# Patient Record
Sex: MALE | Race: WHITE | NOT HISPANIC OR LATINO | Employment: FULL TIME
[De-identification: names, ages, dates, MRNs, and addresses within clinical notes are randomized per-mention and may not be internally consistent; named-entity substitution may affect disease eponyms.]

---

## 2017-05-10 ENCOUNTER — RECORDS - HEALTHEAST (OUTPATIENT)
Dept: ADMINISTRATIVE | Facility: OTHER | Age: 32
End: 2017-05-10

## 2020-09-22 ENCOUNTER — OFFICE VISIT - RIVER FALLS (OUTPATIENT)
Dept: FAMILY MEDICINE | Facility: CLINIC | Age: 35
End: 2020-09-22

## 2020-09-22 ENCOUNTER — COMMUNICATION - RIVER FALLS (OUTPATIENT)
Dept: FAMILY MEDICINE | Facility: CLINIC | Age: 35
End: 2020-09-22

## 2020-09-22 LAB
A/G RATIO - HISTORICAL: 1.5 (ref 1–2)
ALBUMIN SERPL-MCNC: 4.4 G/DL (ref 3.5–5.2)
ALP SERPL-CCNC: 103 IU/L (ref 50–136)
ALT SERPL W P-5'-P-CCNC: 54 IU/L (ref 8–45)
ANION GAP SERPL CALCULATED.3IONS-SCNC: 12 MMOL/L (ref 5–18)
AST SERPL W P-5'-P-CCNC: 29 IU/L (ref 2–40)
BILIRUB DIRECT SERPL-MCNC: 0.4 MG/DL (ref 0.1–0.5)
BILIRUB INDIRECT SERPL-MCNC: 1.2 MG/DL (ref 0.2–0.8)
BILIRUB SERPL-MCNC: 1.6 MG/DL (ref 0.2–1.2)
BUN SERPL-MCNC: 12 MG/DL (ref 8–25)
BUN/CREAT RATIO - HISTORICAL: 12 (ref 10–20)
CALCIUM SERPL-MCNC: 8.9 MG/DL (ref 8.5–10.5)
CHLORIDE BLD-SCNC: 106 MMOL/L (ref 98–110)
CO2 SERPL-SCNC: 23 MMOL/L (ref 21–31)
CREAT SERPL-MCNC: 0.99 MG/DL (ref 0.72–1.25)
ERYTHROCYTE [DISTWIDTH] IN BLOOD BY AUTOMATED COUNT: 13.3 % (ref 11.5–15.5)
GFR ESTIMATE EXT - HISTORICAL: >60
GLOBULIN: 3 G/DL (ref 2–3.7)
GLUCOSE BLD-MCNC: 148 MG/DL (ref 65–100)
HCT VFR BLD AUTO: 48.2 % (ref 37–53)
HGB BLD-MCNC: 17.4 G/DL (ref 13.5–17.5)
MCH RBC QN AUTO: 31 PG (ref 26–34)
MCHC RBC AUTO-ENTMCNC: 36.1 G/DL (ref 32–36)
MCV RBC AUTO: 86 FL (ref 80–100)
PLATELET # BLD AUTO: 174 10*3/UL (ref 140–440)
PMV BLD: 11.8 FL (ref 6.5–11)
POTASSIUM BLD-SCNC: 3.6 MMOL/L (ref 3.5–5)
PROT SERPL-MCNC: 7.4 G/DL (ref 6–8)
RBC # BLD AUTO: 5.62 10*6/UL (ref 4.3–5.9)
SODIUM SERPL-SCNC: 141 MMOL/L (ref 135–145)
TROPONIN I - HISTORICAL: <0.01 NG/ML
WBC # BLD AUTO: 9.2 10*3/UL (ref 4.5–11)

## 2020-09-22 ASSESSMENT — MIFFLIN-ST. JEOR: SCORE: 2080.24

## 2020-09-23 ENCOUNTER — COMMUNICATION - RIVER FALLS (OUTPATIENT)
Dept: FAMILY MEDICINE | Facility: CLINIC | Age: 35
End: 2020-09-23

## 2020-09-23 LAB
MAGNESIUM SERPL-MCNC: 2 MG/DL (ref 1.5–2.5)
TSH SERPL DL<=0.005 MIU/L-ACNC: 1.14 MIU/L (ref 0.4–4.5)

## 2020-09-25 ENCOUNTER — OFFICE VISIT - RIVER FALLS (OUTPATIENT)
Dept: FAMILY MEDICINE | Facility: CLINIC | Age: 35
End: 2020-09-25

## 2020-09-25 ASSESSMENT — MIFFLIN-ST. JEOR: SCORE: 2094.75

## 2020-09-26 LAB
CHOLEST SERPL-MCNC: 196 MG/DL
CHOLEST/HDLC SERPL: 7.8 {RATIO}
HDLC SERPL-MCNC: 25 MG/DL
LDLC SERPL CALC-MCNC: ABNORMAL MG/DL
NONHDLC SERPL-MCNC: 171 MG/DL
TRIGL SERPL-MCNC: 985 MG/DL

## 2020-09-27 ENCOUNTER — COMMUNICATION - RIVER FALLS (OUTPATIENT)
Dept: FAMILY MEDICINE | Facility: CLINIC | Age: 35
End: 2020-09-27

## 2020-09-30 ENCOUNTER — OFFICE VISIT - HEALTHEAST (OUTPATIENT)
Dept: CARDIOLOGY | Facility: TELEHEALTH | Age: 35
End: 2020-09-30

## 2020-09-30 ENCOUNTER — COMMUNICATION - RIVER FALLS (OUTPATIENT)
Dept: FAMILY MEDICINE | Facility: CLINIC | Age: 35
End: 2020-09-30

## 2020-09-30 DIAGNOSIS — R06.81 APNEA: ICD-10-CM

## 2020-09-30 DIAGNOSIS — E66.01 CLASS 2 SEVERE OBESITY DUE TO EXCESS CALORIES WITH SERIOUS COMORBIDITY AND BODY MASS INDEX (BMI) OF 38.0 TO 38.9 IN ADULT (H): ICD-10-CM

## 2020-09-30 DIAGNOSIS — R00.2 PALPITATIONS: ICD-10-CM

## 2020-09-30 DIAGNOSIS — I11.9 HYPERTENSIVE LEFT VENTRICULAR HYPERTROPHY, WITHOUT HEART FAILURE: ICD-10-CM

## 2020-09-30 DIAGNOSIS — E66.812 CLASS 2 SEVERE OBESITY DUE TO EXCESS CALORIES WITH SERIOUS COMORBIDITY AND BODY MASS INDEX (BMI) OF 38.0 TO 38.9 IN ADULT (H): ICD-10-CM

## 2020-09-30 DIAGNOSIS — I10 BENIGN ESSENTIAL HYPERTENSION: ICD-10-CM

## 2020-09-30 RX ORDER — LOSARTAN POTASSIUM AND HYDROCHLOROTHIAZIDE 12.5; 5 MG/1; MG/1
1 TABLET ORAL DAILY
Qty: 90 TABLET | Refills: 3 | Status: SHIPPED | OUTPATIENT
Start: 2020-09-30 | End: 2022-11-08

## 2020-10-06 ENCOUNTER — AMBULATORY - RIVER FALLS (OUTPATIENT)
Dept: FAMILY MEDICINE | Facility: CLINIC | Age: 35
End: 2020-10-06

## 2020-10-12 ENCOUNTER — AMBULATORY - HEALTHEAST (OUTPATIENT)
Dept: CARDIOLOGY | Facility: CLINIC | Age: 35
End: 2020-10-12

## 2020-10-14 ENCOUNTER — COMMUNICATION - HEALTHEAST (OUTPATIENT)
Dept: CARDIOLOGY | Facility: CLINIC | Age: 35
End: 2020-10-14

## 2021-02-09 ENCOUNTER — OFFICE VISIT - RIVER FALLS (OUTPATIENT)
Dept: FAMILY MEDICINE | Facility: CLINIC | Age: 36
End: 2021-02-09

## 2021-02-09 ASSESSMENT — MIFFLIN-ST. JEOR: SCORE: 1948.24

## 2021-02-13 LAB
A/G RATIO - HISTORICAL: 1.9 (ref 1–2.5)
ALBUMIN SERPL-MCNC: 4.1 GM/DL (ref 3.6–5.1)
ALBUMIN UR-MCNC: NEGATIVE G/DL
ALP SERPL-CCNC: 97 UNIT/L (ref 36–130)
ALT SERPL W P-5'-P-CCNC: 15 UNIT/L (ref 9–46)
APPEARANCE UR: CLEAR
AST SERPL W P-5'-P-CCNC: 12 UNIT/L (ref 10–40)
BASOPHILS # BLD MANUAL: 49 10*3/UL (ref 0–200)
BASOPHILS NFR BLD MANUAL: 0.6 %
BILIRUB SERPL-MCNC: 1.2 MG/DL (ref 0.2–1.2)
BILIRUB UR QL STRIP: NEGATIVE
BUN SERPL-MCNC: 7 MG/DL (ref 7–25)
BUN/CREAT RATIO - HISTORICAL: ABNORMAL (ref 6–22)
C PEPTIDE: 1.28 NG/ML (ref 0.8–3.85)
CALCIUM SERPL-MCNC: 9.4 MG/DL (ref 8.6–10.3)
CHLORIDE BLD-SCNC: 101 MMOL/L (ref 98–110)
CO2 SERPL-SCNC: 23 MMOL/L (ref 20–32)
COLOR UR AUTO: YELLOW
CREAT SERPL-MCNC: 0.76 MG/DL (ref 0.6–1.35)
CREAT UR-MCNC: 113 MG/DL (ref 20–320)
EGFRCR SERPLBLD CKD-EPI 2021: 119 ML/MIN/1.73M2
EOSINOPHIL # BLD MANUAL: 303 10*3/UL (ref 15–500)
EOSINOPHIL NFR BLD MANUAL: 3.7 %
ERYTHROCYTE [DISTWIDTH] IN BLOOD BY AUTOMATED COUNT: 12.9 % (ref 11–15)
GLOBULIN: 2.2 (ref 1.9–3.7)
GLUCOSE BLD-MCNC: 378 MG/DL (ref 65–99)
GLUCOSE UR STRIP-MCNC: ABNORMAL MG/DL
HCT VFR BLD AUTO: 44.5 % (ref 38.5–50)
HGB BLD-MCNC: 15.7 GM/DL (ref 13.2–17.1)
HGB UR QL STRIP: NEGATIVE
KETONES UR STRIP-MCNC: ABNORMAL MG/DL
LEUKOCYTE ESTERASE UR QL STRIP: NEGATIVE
LYMPHOCYTES # BLD MANUAL: 2296 10*3/UL (ref 850–3900)
LYMPHOCYTES NFR BLD MANUAL: 28 %
MCH RBC QN AUTO: 31.2 PG (ref 27–33)
MCHC RBC AUTO-ENTMCNC: 35.3 GM/DL (ref 32–36)
MCV RBC AUTO: 88.3 FL (ref 80–100)
MICROALBUMIN UR-MCNC: 2.2 MG/DL
MICROALBUMIN/CREAT UR: 19 MG/G{CREAT}
MONOCYTES # BLD MANUAL: 328 10*3/UL (ref 200–950)
MONOCYTES NFR BLD MANUAL: 4 %
NEUTROPHILS # BLD MANUAL: 5223 10*3/UL (ref 1500–7800)
NEUTROPHILS NFR BLD MANUAL: 63.7 %
NITRATE UR QL: NEGATIVE
PH UR STRIP: 5.5 [PH] (ref 5–8)
PLATELET # BLD AUTO: 137 10*3/UL (ref 140–400)
PMV BLD: 12.4 FL (ref 7.5–12.5)
POTASSIUM BLD-SCNC: 4.4 MMOL/L (ref 3.5–5.3)
PROT SERPL-MCNC: 6.3 GM/DL (ref 6.1–8.1)
RBC # BLD AUTO: 5.04 10*6/UL (ref 4.2–5.8)
SODIUM SERPL-SCNC: 135 MMOL/L (ref 135–146)
SP GR UR STRIP: 1.04 (ref 1–1.03)
TSH SERPL DL<=0.005 MIU/L-ACNC: 0.82 MIU/L (ref 0.4–4.5)
WBC # BLD AUTO: 8.2 10*3/UL (ref 3.8–10.8)

## 2021-02-14 ENCOUNTER — COMMUNICATION - RIVER FALLS (OUTPATIENT)
Dept: FAMILY MEDICINE | Facility: CLINIC | Age: 36
End: 2021-02-14

## 2021-02-19 ENCOUNTER — COMMUNICATION - RIVER FALLS (OUTPATIENT)
Dept: FAMILY MEDICINE | Facility: CLINIC | Age: 36
End: 2021-02-19

## 2021-03-04 ENCOUNTER — OFFICE VISIT - RIVER FALLS (OUTPATIENT)
Dept: FAMILY MEDICINE | Facility: CLINIC | Age: 36
End: 2021-03-04

## 2021-03-04 ASSESSMENT — MIFFLIN-ST. JEOR: SCORE: 1973.19

## 2021-04-06 ENCOUNTER — OFFICE VISIT - RIVER FALLS (OUTPATIENT)
Dept: FAMILY MEDICINE | Facility: CLINIC | Age: 36
End: 2021-04-06

## 2021-04-09 ENCOUNTER — COMMUNICATION - HEALTHEAST (OUTPATIENT)
Dept: CARDIOLOGY | Facility: CLINIC | Age: 36
End: 2021-04-09

## 2021-04-23 ENCOUNTER — COMMUNICATION - HEALTHEAST (OUTPATIENT)
Dept: ADMINISTRATIVE | Facility: CLINIC | Age: 36
End: 2021-04-23

## 2021-05-30 VITALS — BODY MASS INDEX: 36.92 KG/M2 | HEIGHT: 69 IN

## 2021-05-30 VITALS — WEIGHT: 250 LBS

## 2021-06-02 ENCOUNTER — OFFICE VISIT - RIVER FALLS (OUTPATIENT)
Dept: FAMILY MEDICINE | Facility: CLINIC | Age: 36
End: 2021-06-02

## 2021-06-02 ASSESSMENT — MIFFLIN-ST. JEOR: SCORE: 2003.13

## 2021-06-04 VITALS
DIASTOLIC BLOOD PRESSURE: 94 MMHG | SYSTOLIC BLOOD PRESSURE: 142 MMHG | BODY MASS INDEX: 38.54 KG/M2 | TEMPERATURE: 98 F | WEIGHT: 261 LBS

## 2021-06-12 NOTE — TELEPHONE ENCOUNTER
----- Message from Haris Post DO sent at 10/14/2020 10:21 AM CDT -----  Inform the patient that I reviewed his Holter monitor.  There is no significant arrhythmias during his symptomatic episodes.  There is no arrhythmias during the entire cardiac monitoring period.  Please inquire if patient's blood pressure is improved.  He had significant hypertension which needs to be better control which is likely the cause of some of his symptoms.  ----- Message -----  From: Karlie Shea  Sent: 10/12/2020   1:38 PM CDT  To: Haris Post DO

## 2021-06-12 NOTE — TELEPHONE ENCOUNTER
PC to patient and discussion of results and recommendations from provider. Pt doesn't have a blood pressure cuff at home at this time. Discussion of obtaining a digital at home blood pressure monitor with arm cuff. Pt agrees to go purchase one and states that he has a HSA to utilize for purchase. Instructed patient to purchase and take blood pressure reading and HR every day or every other day, and writer down on a log and keep track. Explained rationale, and requested patient call writer after a couple of weeks of monitoring to discuss and give update to MAT. Pt verbalized understanding. No further questions. Has writers' direct phone number to contact once ready to do so. Munir,Rn

## 2021-06-21 NOTE — LETTER
Letter by Haris Post DO at      Author: Haris Post DO Service: -- Author Type: --    Filed:  Encounter Date: 4/23/2021 Status: (Other)         Wolf Gibbs   [address] [facility] [address]      April 23, 2021      Dear Wolf,    This letter is to remind you that you will be due for your follow up appointment with Dr. Haris Post in May, 2021 . To help ensure you are in the best health possible, a regular follow-up with your cardiologist is essential.     Please call our Patient Scheduling Line at [phone] to schedule your appointment at your earliest convenience.  If you have recently scheduled an appointment, please disregard this letter.    We look forward to seeing you again. As always, we are available at the number  above for any questions or concerns you may have.      Sincerely,     The Physicians and Staff of [facility]

## 2021-06-21 NOTE — LETTER
Letter by Haris Post DO at      Author: Haris Post DO Service: -- Author Type: --    Filed:  Encounter Date: 4/9/2021 Status: (Other)         Wolf Gibbs   [address] [facility] [address]      April 9, 2021      Dear Wolf,    This letter is to remind you that you are due for your follow up appointment with Dr. Haris Post. To help ensure you are in the best health possible, a regular follow-up with your cardiologist is essential.     Please call our Patient Scheduling Line at [phone] to schedule your appointment at your earliest convenience.  If you have recently scheduled an appointment, please disregard this letter.    We look forward to seeing you again. As always, we are available at the number  above for any questions or concerns you may have.      Sincerely,     The Physicians and Staff of [facility]

## 2021-06-29 NOTE — PROGRESS NOTES
Progress Notes by Haris Post DO at 9/30/2020  3:30 PM     Author: Haris Post DO Service: -- Author Type: Physician    Filed: 9/30/2020 10:17 PM Encounter Date: 9/30/2020 Status: Signed    : Haris Post DO (Physician)         Thank you, Dr. Duff, for asking the [facility] team to see Mr. Wolf Gibbs to evaluate palpitations, hypertension.      Assessment/Recommendations   Assessment:    1.  Palpitations, frequent.  Patient has frequent palpitations lasting anywhere from a few minutes up to 30 minutes.  Associated lightheadedness and sometimes shortness of breath.  2.  Uncontrolled hypertension  3.  Left ventricular hypertrophy  4.  Needs screening for sleep apnea.  Severe snoring and withness sleep apnea.   5. Obesity (BMI:38) with serious comorbidities    Plan:  1.  Change losartan to losartan 50 mg/hydrochlorothiazide 12.5 mg.  Patient has stage II hypertension likely clinic taking 2-3 medications to control.  He has left ventricular hypertrophy is recommend ARB, followed by a diuretic followed by beta-blocker if needed.  2.  48-hour Holter monitor to determine the cause of his frequent palpitations.  3. Complete echocardiogram to assess his left ventricular hypertrophy.  4. Screening and referral for sleep apnea needed.         History of Present Illness/Subjective    Mr. Wolf Gibbs is a 35 y.o. male with history of left ventricular hypertrophy by prior echo, hypertension has been uncontrolled and possible sleep apnea presents in consultation for palpitations and uncontrolled hypertension.    Patient was recently evaluated by his primary care provider with elevated blood pressure readings.  He was initially started on losartan and increased to 50 mg daily.  Currently he is uncontrolled blood pressure standpoint.  He has a high salt diet.  Does not exercise and remains overweight.  He has significant symptoms concerning for sleep  apnea.    Long discussion with the patient that he needs to reduce his salt intake.  He needs to get better control of his blood pressure and will start hydrochlorothiazide in combination with losartan.  He has stage II hypertension will likely require 2-3 medications to control his blood pressure.  Given he is left ventricular hypertrophy on his echocardiogram in 2017 he likely has progression of his left ventricular hypertrophy and will result in hypertensive heart disease and possible heart failure if this not further controlled.  Palpitations could be resulting from an atrial arrhythmia.  We will obtain a cardiac monitor to for further evaluate.  He states his palpitations last anywhere from a few minutes to up to 30 minutes.  He has forceful beats but also fluttering in his chest.  Symptoms appear to be worse with sleep deprivation.  Worse with increased alcohol use and also increased caffeine use.         Physical Examination Review of Systems   Vitals:    09/30/20 1535   BP: (!) 142/94   Temp: 98  F (36.7  C)     Body mass index is 38.54 kg/m .  Wt Readings from Last 3 Encounters:   09/30/20 (!) 261 lb (118.4 kg)     [unfilled]  General Appearance:   no distress, obese body habitus   ENT/Mouth: membranes moist, no oral lesions or bleeding gums.      EYES:  no scleral icterus, normal conjunctivae   Neck: no carotid bruits or thyromegaly   Chest/Lungs:   lungs are clear to auscultation, no rales or wheezing, no sternal scar, equal chest wall expansion    Cardiovascular:   Regular. Normal first and second heart sounds withno murmurs, rubs, or gallops; the carotid, radial and posterior tibial pulses are intact, Jugular venous pressure normal, trace pitting edema bilaterally    Abdomen:  no organomegaly, masses, bruits, or tenderness; bowel sounds are present   Extremities: no cyanosis or clubbing   Skin: no xanthelasma, warm.    Neurologic: normal  bilateral, no tremors     Psychiatric: alert and oriented x3,  calm     General: Weight Gain, Weight Loss  Eyes: WNL  Ears/Nose/Throat: WNL  Lungs: WNL  Heart: Irregular Heartbeat  Stomach: WNL  Bladder: WNL  Muscle/Joints: WNL  Skin: WNL  Nervous System: WNL        Blood: WNL     Medical History  Surgical History Family History Social History   Hypertension  no prior cardiac surgeries  family history of coronary disease and hypertension in his parents. Social History     Socioeconomic History   ? Marital status: Single     Spouse name: Not on file   ? Number of children: Not on file   ? Years of education: Not on file   ? Highest education level: Not on file   Occupational History   ? Not on file   Social Needs   ? Financial resource strain: Not on file   ? Food insecurity     Worry: Not on file     Inability: Not on file   ? Transportation needs     Medical: Not on file     Non-medical: Not on file   Tobacco Use   ? Smoking status: Current Every Day Smoker     Packs/day: 1.00   ? Smokeless tobacco: Never Used   Substance and Sexual Activity   ? Alcohol use: Not on file   ? Drug use: Not on file   ? Sexual activity: Not on file   Lifestyle   ? Physical activity     Days per week: Not on file     Minutes per session: Not on file   ? Stress: Not on file   Relationships   ? Social connections     Talks on phone: Not on file     Gets together: Not on file     Attends [employer] service: Not on file     Active member of club or organization: Not on file     Attends meetings of clubs or organizations: Not on file     Relationship status: Not on file   ? Intimate partner violence     Fear of current or ex partner: Not on file     Emotionally abused: Not on file     Physically abused: Not on file     Forced sexual activity: Not on file   Other Topics Concern   ? Not on file   Social History Narrative   ? Not on file          Medications  Allergies   Current Outpatient Medications   Medication Sig Dispense Refill   ? losartan (COZAAR) 50 MG tablet Take 50 mg by mouth daily.     ?  losartan-hydrochlorothiazide (HYZAAR) 50-12.5 mg per tablet Take 1 tablet by mouth daily. 90 tablet 3     No current facility-administered medications for this visit.     No Known Allergies      Lab Results    Chemistry/lipid CBC Cardiac Enzymes/BNP/TSH/INR   No results found for: CHOL, HDL, LDLCALC, TRIG, CREATININE, BUN, K, NA, CL, CO2 No results found for: WBC, HGB, HCT, MCV, PLT No results found for: CKTOTAL, CKMB, CKMBINDEX, TROPONINI, BNP, TSH, INR

## 2021-07-07 ENCOUNTER — OFFICE VISIT - RIVER FALLS (OUTPATIENT)
Dept: FAMILY MEDICINE | Facility: CLINIC | Age: 36
End: 2021-07-07

## 2022-02-11 VITALS
HEART RATE: 80 BPM | WEIGHT: 257.8 LBS | WEIGHT: 254.6 LBS | HEIGHT: 69 IN | HEART RATE: 92 BPM | HEIGHT: 69 IN | BODY MASS INDEX: 38.18 KG/M2 | SYSTOLIC BLOOD PRESSURE: 146 MMHG | BODY MASS INDEX: 37.71 KG/M2 | DIASTOLIC BLOOD PRESSURE: 94 MMHG | DIASTOLIC BLOOD PRESSURE: 100 MMHG | TEMPERATURE: 97.4 F | SYSTOLIC BLOOD PRESSURE: 160 MMHG | TEMPERATURE: 98.3 F

## 2022-02-11 VITALS — HEIGHT: 69 IN | WEIGHT: 231 LBS | BODY MASS INDEX: 34.21 KG/M2

## 2022-02-11 VITALS
BODY MASS INDEX: 33.4 KG/M2 | HEART RATE: 89 BPM | SYSTOLIC BLOOD PRESSURE: 136 MMHG | DIASTOLIC BLOOD PRESSURE: 77 MMHG | TEMPERATURE: 97.8 F | HEIGHT: 69 IN | WEIGHT: 225.5 LBS

## 2022-02-12 VITALS — BODY MASS INDEX: 35.19 KG/M2 | HEIGHT: 69 IN | WEIGHT: 237.6 LBS

## 2022-02-16 NOTE — TELEPHONE ENCOUNTER
---------------------  From: Cielo Noble   To: Tavares Jarquin MD;     Sent: 2/11/2021 9:12:24 AM CST  Subject: General Message     Pt calls this am with glucose readings.    Pt did get the Dexcom G6 sensor last evening - working great.    Lantus 14u last evening took as directed    am 198mg/dL   Taking Humalog 1u:10gm carbohydrate    Correction factor   200 - 250 +1  251 - 300 +2  301 - 350 +3  351 - 400 +4  401 - 450 +5   451 - 500 +6  501 - 550 +7    Did eat a banana and yogurt this am glucose went up to 350 and now at 289    no changes at this time   Pt to continue to update often.    Awaiting labsReviewed labs with pt.    Currently at 17u Lantus will continue to increase.    Vision continues to be blurry   BG did go down to 125 on 2/13/2021 and pt felt very shaky.      Pt taking Humalog ~ 30 - 40 min premeal to help post prandial spikes.  Humalog 1u:10gm carbohydrate    Correction factor   200 - 250 +1  251 - 300 +2  301 - 350 +3  351 - 400 +4  401 - 450 +5   451 - 500 +6  501 - 550 +7

## 2022-02-16 NOTE — PROGRESS NOTES
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    Referral source:  Tavares Jarquin MD.       Chief Complaint   Type I DM       History of Present Illness   Positive ITA and IA2 Antibody testing, C-peptide WNL   A1C  13% = 326 mg/dL estimated Average Glucose (eAG)    Pt is here today to start on the Tandem X2 with control IQ technology insulin pump, currently using Dexcom G6.    Lantus 18u   Humalog 1u:10gm carbohydrate    Correction factor   200 - 250 +1  251 - 300 +2  301 - 350 +3  351 - 400 +4  401 - 450 +5   451 - 500 +6  501 - 550 +7    2 week avg as noted below avg glucose 164mg/dL     (Inserted Image. Unable to display)       Health Status   Allergies:    Allergic Reactions (Selected)  Severity Not Documented  No known allergies (No reactions were documented)  No Known Medication Allergies   Medications:  (Selected)   Prescriptions  Prescribed  32G 4mm insulin pen needles: 32G 4mm insulin pen needles, See Instructions, Instructions: injecting 4-6x/ day, Supply, # 100 EA, 3 Refill(s), Type: Maintenance, Pharmacy: [facility] #[id number], injecting 4-6x/ day, 69, in, 21 15:19:00 CST, Height Measured, 225.5, lb,...  Dexcom G6 Transmitter: Dexcom G6 Transmitter, See Instructions, Instructions: change every 3 months, Supply, # 1 EA, 0 Refill(s), Type: Maintenance, Pharmacy: [facility] #[id number], change every 3 months, 69, in, 21 15:19:00 CST, Height Measured, 225.5, lb, 02/0...  Dexcom G6 sensors: Dexcom G6 sensors, See Instructions, Instructions: change every 10 days, Supply, # 9 EA, 3 Refill(s), Type: Maintenance, Pharmacy: [facility] #[id number], change every 10 days, 69, in, 21 15:19:00 CST,  Height Measured, 225.5, lb, 02/09/21 1...  HumaLOG KwikPen 100 units/mL injectable solution: See Instructions, Instructions: 1u:10 gm carbs plus correction factor taking up to 30u daily, # 30 mL, 1 Refill(s), Type: Maintenance, [id number], 1u:10 gm carbs plus correction factor taking up to 30u daily, 69, in, 02/09/2...  T:SLIM X2 INSULIN PUMP/CONTROL-IQ TECHNOLOGY 7.4 CNTRL7.4 DEVICE: T:SLIM X2 INSULIN PUMP/CONTROL-IQ TECHNOLOGY 7.4 CNTRL7.4 DEVICE, See Instructions, Instructions: USE AS DIRECTED, Supply, # 1 EA, 0 Refill(s), Type: Maintenance, Pharmacy: [facility], USE AS DIRECTED, 69, in, 02/09/21 15:19:00 CST, Height Me...  losartan 50 mg oral tablet: = 1 tab(s) ( 50 mg ), Oral, daily, # 90 tab(s), 1 Refill(s), Type: Maintenance, [id number], 1 tab(s) Oral daily, 69, in, 09/25/20 14:45:00 CDT, Height Measured, 257.8, lb, 09/25/20 14:45:00 CDT, Weight Measured  metFORMIN 500 mg oral tablet: = 1 tab(s) ( 500 mg ), Oral, bid, # 60 tab(s), 0 Refill(s), Type: Maintenance, [id number], 1 tab(s) Oral bid, 69, in, 02/09/21 15:19:00 CST, Height Measured, 225.5, lb, 02/09/21 15:19:00 CST, Weight Measured,    Medications          *denotes recorded medication          T:SLIM X2 INSULIN PUMP/CONTROL-IQ TECHNOLOGY 7.4 CNTRL7.4 DEVICE: See Instructions, USE AS DIRECTED, 1 EA, 0 Refill(s).          Dexcom G6 sensors: See Instructions, change every 10 days, 9 EA, 3 Refill(s).          Dexcom G6 Transmitter: See Instructions, change every 3 months, 1 EA, 0 Refill(s).          32G 4mm insulin pen needles: See Instructions, injecting 4-6x/ day, 100 EA, 3 Refill(s).          HumaLOG KwikPen 100 units/mL injectable solution: See Instructions, 1u:10 gm carbs plus correction factor taking up to 30u daily, 30 mL, 1 Refill(s).          losartan 50 mg oral tablet: 50 mg, 1 tab(s), Oral, daily, 90 tab(s), 1 Refill(s).          metFORMIN 500 mg oral  tablet: 500 mg, 1 tab(s), Oral, bid, 60 tab(s), 0 Refill(s).       Problem list:    All Problems  Tobacco Use Disorder / ICD-9-.1 / Confirmed  Unspecified Essential Hypertension / ICD-9-.9 / Confirmed  Pure Hyperglyceridemia / ICD-9-.1 / Confirmed  Obesity, Unspecified / ICD-9-.00 / Probable      Histories   Past Medical History:    Resolved  CERVICALGIA (723.1):  Resolved.   Family History:    Diabetes mellitus  Sister  High blood pressure  Mother  Alcohol abuse  Father  Depression  Father  Hyperthyroidism  Mother     Procedure history:    No known procedures (SNOMED CT [id number]).   Social History:        Electronic Cigarette/Vaping Assessment            Electronic Cigarette Use: Never.      Alcohol Assessment            Current, Liquor (Hard) (1.5 oz), 1-2 times per week, 4 drinks/episode average.  Ready to change: No.      Tobacco Assessment            10 or more cigarettes (1/2 pack or more)/day in last 30 days, Cigarettes, 10 year(s).      Substance Abuse Assessment            Never      Employment and Education Assessment            Employed      Home and Environment Assessment            Marital status: Single.  Living situation: Home/Independent.  Injuries/Abuse/Neglect in household: No.  Feels               unsafe at home: No.  Family/Friends available for support: Yes.      Nutrition and Health Assessment            Type of diet: Regular.  Wants to lose weight: Yes.      Exercise and Physical Activity Assessment            Exercise frequency: 3-4 times/week.      Sexual Assessment            Sexually active: Yes.  Identifies as male, Sexual orientation: Straight or heterosexual.  History of STD: No.               Uses condoms: Yes.  History of sexual abuse: No.        Physical Examination   Measurements from flowsheet : Measurements   3/4/2021 2:16 PM CST Height Measured - Standard 69 in    Weight Measured - Standard 231 lb    BSA 2.26 m2    Body Mass Index 34.11 kg/m2  HI          Review / Management   Results review:  Lab results   2/9/2021 4:00 PM CST Sodium Level 135 mmol/L    Potassium Level 4.4 mmol/L    Chloride Level 101 mmol/L    CO2 Level 23 mmol/L    Glucose Level 378 mg/dL  HI    BUN 7 mg/dL    Creatinine 0.76 mg/dL    BUN/Creat Ratio NOT APPLICABLE    eGFR 119 mL/min/1.73m2    eGFR African American 138 mL/min/1.73m2    Calcium Level 9.4 mg/dL    Bili Total 1.2 mg/dL    Alk Phos 97 unit/L    AST/SGOT 12 unit/L    ALT/SGPT 15 unit/L    Protein Total 6.3 gm/dL    Albumin Level 4.1 gm/dL    Globulin 2.2    A/G Ratio 1.9    Hgb A1c 13.0  HI    TSH 0.82 mIU/L    C-Peptide 1.28 ng/mL    U Creatinine 113 mg/dL    U Microalbumin 2.2 mg/dL    Ur Microalb/Creat Ratio 19    WBC 8.2    RBC 5.04    Hgb 15.7 gm/dL    Hct 44.5 %    MCV 88.3 fL    MCH 31.2 pg    MCHC 35.3 gm/dL    RDW 12.9 %    Platelet 137  LOW    MPV 12.4 fL    Lymphs 28.0 %    Abs Lymphs 2,296    Neutrophils 63.7 %    Abs Neutrophils 5,223    Monocytes 4.0 %    Abs Monocytes 328    Eosinophils 3.7 %    Abs Eosinophils 303    Basophils 0.6 %    Abs Basophils 49    UA Color YELLOW    UA Appear CLEAR    UA pH 5.5    UA Specific Gravity 1.043  HI    UA Glucose 3+    UA Bilirubin NEGATIVE    UA Ketones TRACE    UA Blood NEGATIVE    UA Protein NEGATIVE    UA Nitrite NEGATIVE    UA Leuk Est NEGATIVE    Glutamic Acid Decarboxylase (ITA) >250 IU/mL    Insulin Ab <0.4 unit/mL    IA-2 Antibody >350.0 unit/mL   .       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     Insulin Pump start up education completed.  Pump settings (basal/bolus), menu options and use, bolus, suspend, temp basal, BG testing recommendations, hypo/hyperglycemia guidelines, infusion set changes and fill and DKA prevention.  Insulin pump insertion completed successfully.      ICR     12:00 10    Sensitivity = 50    Target 120     Basal: = 13.44u/ day     MN- 0.56    Insulin action 5 hours  (control IQ - can not change)     Treatment values in Control -IQ technology  Delivers      180 mg/dL  Increases      160 mg/dL  Control IQ maintains  112.5 - 160 mg/dL   Decreases      112.5 (exercise 140)  Stops       70 (exercise 80)  Sleep       112.5 - 120 mg/dL  Exercise Activity    140 - 160 mg/dL     Goals:  1.  A1c <6.5%  2.  BG target 70 - 180mg/dL 80% in target range   3.  Physical active 150 min/ week   4.  Carb controlled heart healthy meal plan <130gm CHO/ day; <200mg Cholesterol/ day     Follow up in 1 mo        Professional Services   Time spent with pt 60 min   cc  Dr. Jarquin

## 2022-02-16 NOTE — NURSING NOTE
48 hour Holter Monitor placed on patient per Haris Post MD for a diagnosis of palpitations . Patient instructions given for all aspects of monitor operation and handling. Monitor kit sent with patient with contact information for [employer]. Patient leaves clinic ambulatory. More than 15 minutes spent with patient for education and instruction.Printed Holter Monitor Report and routed to [facility] for interpretation.  Event Monitor pending.Holter Monitor recorded only 24 hours data - no event monitor pending.

## 2022-02-16 NOTE — PROGRESS NOTES
Patient:   RENUKA MCNAMARA            MRN: [id number]            FIN: [id number]               Age:   35 years     Sex:  Male     :  1985   Associated Diagnoses:   Elevated glucose level   Author:   Tavares Jarquin MD      Visit Information      Date of Service: 2021 03:15 pm  Performing Location: [facility]  Encounter#: [id number]      Primary Care Provider (PCP):  NONE ,       Referring Provider:  Tavares Jarquin MD    NPI# [id number]      Chief Complaint   2021 3:19 PM CST     ER f/up        History of Present Illness   Patient is here with diabetes.  It was seen in the emergency room in [place] day before yesterday when he had passed out his blood sugar was in the high 600s he was given insulin and fluids and he hopped on a plane yesterday.  Upon getting home his mom has a glucometer his sugar was 488 he went back to the ER got fluids and more insulin.  He is here for follow-up now.  He has a sister with type 1 diabetes he is never been diagnosed with diabetes he had a exam last year with a mildly elevated nonfasting blood sugar but triglycerides over 900.  He notes that his symptoms of diabetes have been there for the last few months with polyuria and polydipsia.  He notes his vision is not quite as good either.  No other symptoms         Review of Systems   Constitutional:  Negative except as documented in history of present illness.    Eye:  Negative except as documented in history of present illness.    Ear/Nose/Mouth/Throat:  Negative.    Respiratory:  Negative.    Cardiovascular:  Negative.    Gastrointestinal:  Negative.    Genitourinary:  Negative.    Hematology/Lymphatics:  Negative.    Endocrine:  Negative.    Immunologic:  Negative.    Musculoskeletal:  Negative.    Integumentary:  Negative.    Neurologic:  Negative except as documented in history of present illness.       Health Status   Allergies:    Allergic Reactions (Selected)  Severity Not Documented  No  known allergies (No reactions were documented)  No Known Medication Allergies   Medications:  (Selected)   Prescriptions  Prescribed  Dexcom G6 Transmitter: Dexcom G6 Transmitter, See Instructions, Instructions: change every 3 months, Supply, # 1 EA, 0 Refill(s), Type: Maintenance, Pharmacy: [facility] #[id number], change every 3 months, 69, in, 02/09/21 15:19:00 CST, Height Measured, 225.5, lb, 02/0...  Dexcom G6 sensors: Dexcom G6 sensors, See Instructions, Instructions: change every 10 days, Supply, # 9 EA, 3 Refill(s), Type: Maintenance, Pharmacy: [facility] #[id number], change every 10 days, 69, in, 02/09/21 15:19:00 CST, Height Measured, 225.5, lb, 02/09/21 1...  losartan 50 mg oral tablet: = 1 tab(s) ( 50 mg ), Oral, daily, # 90 tab(s), 1 Refill(s), Type: Maintenance, Pharmacy: [facility] #[id number], 1 tab(s) Oral daily, 69, in, 09/25/20 14:45:00 CDT, Height Measured, 257.8, lb, 09/25/20 14:45:00 CDT, Weight Measured  metFORMIN 500 mg oral tablet: = 1 tab(s) ( 500 mg ), Oral, bid, # 60 tab(s), 0 Refill(s), Type: Maintenance, Pharmacy: [facility] #[id number], 1 tab(s) Oral bid, 69, in, 02/09/21 15:19:00 CST, Height Measured, 225.5, lb, 02/09/21 15:19:00 CST, Weight Measured   Problem list:    All Problems  Obesity, Unspecified / ICD-9-.00 / Probable  Pure Hyperglyceridemia / ICD-9-.1 / Confirmed  Tobacco Use Disorder / ICD-9-.1 / Confirmed  Unspecified Essential Hypertension / ICD-9-.9 / Confirmed      Histories   Past Medical History:    Resolved  CERVICALGIA (723.1):  Resolved.   Family History:    Diabetes mellitus  Sister  High blood pressure  Mother  Alcohol abuse  Father  Depression  Father  Hyperthyroidism  Mother     Procedure history:    No known procedures (SNOMED CT 5570883885).   Social History:        Electronic Cigarette/Vaping Assessment            Electronic Cigarette Use: Never.      Alcohol Assessment            Current, Liquor (Hard) (1.5 oz), 1-2  times per week, 4 drinks/episode average.  Ready to change: No.      Tobacco Assessment            10 or more cigarettes (1/2 pack or more)/day in last 30 days, Cigarettes, 10 year(s).      Substance Abuse Assessment            Never      Employment and Education Assessment            Employed      Home and Environment Assessment            Marital status: Single.  Living situation: Home/Independent.  Injuries/Abuse/Neglect in household: No.  Feels               unsafe at home: No.  Family/Friends available for support: Yes.      Nutrition and Health Assessment            Type of diet: Regular.  Wants to lose weight: Yes.      Exercise and Physical Activity Assessment            Exercise frequency: 3-4 times/week.      Sexual Assessment            Sexually active: Yes.  Identifies as male, Sexual orientation: Straight or heterosexual.  History of STD: No.               Uses condoms: Yes.  History of sexual abuse: No.        Physical Examination   Measurements from flowsheet : Measurements   2/9/2021 3:19 PM CST Height Measured - Standard 69 in    Weight Measured - Standard 225.5 lb    BSA 2.23 m2    Body Mass Index 33.3 kg/m2  HI         Impression and Plan   Diagnosis     Elevated glucose level (SMT39-IU R73.09).     Course:  Not progressing as expected.    Plan:  Patient with new onset diabetes very possibly type I will get appropriate labs done we will see diabetic educator this afternoon to initiate insulin and diabetic training will be done.  We will have him get a visit with his eye doctor set up after we get his sugars under reasonable control.  He will be started on Metformin 500 mg twice daily and see us back in a month.  .

## 2022-02-16 NOTE — NURSING NOTE
Depression Screening Entered On:  9/24/2020 2:22 PM CDT    Performed On:  9/22/2020 2:22 PM CDT by Kimberly Up               Depression Screening   Little Interest - Pleasure in Activities :   Not at all   Feeling Down, Depressed, Hopeless :   Not at all   Initial Depression Screen Score :   0 Score   Poor Appetite or Overeating :   Not at all   Trouble Falling or Staying Asleep :   Not at all   Feeling Tired or Little Energy :   Not at all   Feeling Bad About Yourself :   Not at all   Trouble Concentrating :   Not at all   Moving or Speaking Slowly :   Not at all   Thoughts Better Off Dead or Hurting Self :   Not at all   Detailed Depression Screen Score :   0    Total Depression Screen Score :   0    Kimberly Up - 9/24/2020 2:22 PM CDT

## 2022-02-16 NOTE — LETTER
(Inserted Image. Unable to display)   September 27, 2020      RENUKA MCNAMARA       [address] [place], [place] [id number]        Dear RENUKA,    Thank you for selecting [facility] for your healthcare needs.  Below you will find the results of the recent tests done at our clinic.     We will discuss this at your next visit.      Result Name Current Result Reference Range   Cholesterol (mg/dL)  196 9/25/2020  - <200   HDL (mg/dL) ((L)) 25 9/25/2020 > OR = 40 -    LDL Direct (mg/dL)  53 9/25/2020  - <100   Triglyceride (mg/dL) ((H)) 985 9/25/2020  - <150       Please contact me or my assistant at [phone] if you have any questions.     Sincerely,        Tavares Jarquin MD        What do your labs mean?  Below is a glossary of commonly ordered labs:  LDL   Bad Cholesterol   HDL   Good Cholesterol  AST/ALT   Liver Function   Cr/Creatinine   Kidney Function  Microalbumin   Kidney Function  BUN   Kidney Function  PSA   Prostate    TSH   Thyroid Hormone  HgbA1c   Diabetes Test   Hgb (Hemoglobin)   Red Blood Cells   Please see patient message and advise.  He was seen today for a pre op.

## 2022-02-16 NOTE — LETTER
(Inserted Image. Unable to display)     December 28, 2020      RENUKA MCNAMARA   [address], [place] [phone]          Dear RENUKA,      Thank you for selecting [facility] ([facility] [place] for your healthcare needs.    Our records indicate you are due for the following services:     Follow-up office visit / Medication Check.    (FYI   Regarding office visits: In some instances, a video visit or telephone visit may be offered as an option.)      To schedule an appointment or if you have further questions, please contact your primary clinic:   [facility]       [phone]   [facility]       [phone]              [facility]     [phone]      Powered by [email or web address]    Sincerely,    Tavares Jarquin MD

## 2022-02-16 NOTE — NURSING NOTE
Quick Intake Entered On:  6/2/2021 10:35 PM CDT    Performed On:  6/2/2021 10:34 PM CDT by Cielo Noble               Summary   Weight Measured :   237.6 lb(Converted to: 237 lb 10 oz, 107.774 kg)    Height Measured :   69 in(Converted to: 5 ft 9 in, 175.26 cm)    Body Mass Index :   35.08 kg/m2 (HI)    Body Surface Area :   2.29 m2   Cielo Noble - 6/2/2021 10:34 PM CDT

## 2022-02-16 NOTE — NURSING NOTE
Quick Intake Entered On:  3/4/2021 2:16 PM CST    Performed On:  3/4/2021 2:16 PM CST by Cielo Noble               Summary   Weight Measured :   231 lb(Converted to: 231 lb 0 oz, 104.780 kg)    Height Measured :   69 in(Converted to: 5 ft 9 in, 175.26 cm)    Body Mass Index :   34.11 kg/m2 (HI)    Body Surface Area :   2.26 m2   Cielo Noble - 3/4/2021 2:16 PM CST

## 2022-02-16 NOTE — LETTER
(Inserted Image. Unable to display)         September 23, 2020        RENUKA MCNAMARA   [address] [place] [phone]        Dear RENUKA,    Thank you for selecting [facility] for your healthcare needs. Below you will find the results of your recent test(s) done at our clinic.      Thyroid testing and magnesium level normal.      Result Name Current Result Reference Range   Magnesium Level (mg/dL)  2.0 9/22/2020 1.5 - 2.5   TSH (mIU/L)  1.14 9/22/2020 0.40 - 4.50       Please contact my practice at [phone] if you have any questions or concerns.     Sincerely,        Mago Duff MD      What do your labs mean?  Below is a glossary of commonly ordered labs:  LDL - Bad Cholesterol  HDL- Good Cholesterol  AST/ALT- Liver Function  Cr/Creatinine - Kidney Function  Microalbumin - Kidney Function  BUN - Kidney Function  PSA - Prostate   TSH - Thyroid  HgbA1c - Diabetes Test  Hgb (Hemoglobin) - Red Blood Cells

## 2022-02-16 NOTE — TELEPHONE ENCOUNTER
---------------------  From: Kate Barragan CMA   To: Appointment Pool ([id number]);     Sent: 4/6/2021 2:56:34 PM CDT  Subject: General Message     Can you please call him to get rescheduled with TFS for DM checkLM ON [id number] on VM to reschedule

## 2022-02-16 NOTE — NURSING NOTE
CAGE Assessment Entered On:  9/24/2020 2:22 PM CDT    Performed On:  9/22/2020 2:22 PM CDT by Kimberly Up               Assessment   Have you ever felt you should cut down on your drinking :   No   Have people annoyed you by criticizing your drinking :   No   Have you ever felt bad or guilty about your drinking :   No   Have you ever taken a drink first thing in the morning to steady your nerves or get rid of a hangover (Eye-opener) :   No   CAGE Score :   0    Kimberly Up - 9/24/2020 2:22 PM CDT

## 2022-02-16 NOTE — NURSING NOTE
Comprehensive Intake Entered On:  9/22/2020 1:11 PM CDT    Performed On:  9/22/2020 1:06 PM CDT by Aracelis Singh CMA               Summary   Chief Complaint :   c/o heart palpitations since Sunday    Weight Measured :   254.6 lb(Converted to: 254 lb 10 oz, 115.48 kg)    Height Measured :   69 in(Converted to: 5 ft 9 in, 175.26 cm)    Body Mass Index :   37.59 kg/m2 (HI)    Body Surface Area :   2.37 m2   Systolic Blood Pressure :   170 mmHg (HI)    Diastolic Blood Pressure :   100 mmHg (HI)    Mean Arterial Pressure :   123 mmHg   Peripheral Pulse Rate :   80 bpm   BP Site :   Right arm   Pulse Site :   Radial artery   BP Method :   Manual   HR Method :   Manual   Temperature Tympanic :   98.3 DegF(Converted to: 36.8 DegC)    Aracelis Singh CMA - 9/22/2020 1:06 PM CDT   Health Status   Allergies Verified? :   Yes   Medication History Verified? :   Yes   Medical History Verified? :   No   Pre-Visit Planning Status :   Completed   Tobacco Use? :   Current every day smoker   Aracelis Singh CMA - 9/22/2020 1:06 PM CDT   Consents   Consent for Immunization Exchange :   Consent Granted   Consent for Immunizations to Providers :   Consent Granted   Aracelis Singh CMA - 9/22/2020 1:06 PM CDT   Meds / Allergies   (As Of: 9/22/2020 1:11:24 PM CDT)   Allergies (Active)   No known allergies  Estimated Onset Date:   Unspecified ; Created By:   Generated Domain User for [id number]; Reaction Status:   Active ; Substance:   No known allergies ; Updated By:   Generated Domain User for [id number]; Source:   Patient ; Reviewed Date:   11/13/2010 12:00 AM CST      No Known Medication Allergies  Estimated Onset Date:   Unspecified ; Created By:   Aracelis Singh CMA; Reaction Status:   Active ; Category:   Drug ; Substance:   No Known Medication Allergies ; Type:   Allergy ; Updated By:   Aracelis Singh CMA; Reviewed Date:   9/22/2020 1:09 PM CDT        Medication List   (As Of: 9/22/2020 1:11:24 PM CDT)        ID Risk Screen   Recent Travel  History :   No recent travel   Family Member Travel History :   No recent travel   Other Exposure to Infectious Disease :   Unknown   Aracelis Singh CMA - 9/22/2020 1:06 PM CDT

## 2022-02-16 NOTE — NURSING NOTE
Vital Signs Entered On:  9/22/2020 2:19 PM CDT    Performed On:  9/22/2020 2:19 PM CDT by Aracelis Singh CMA               Vital Signs   Systolic Blood Pressure Repeat :   160 mmHg   Diastolic Blood Pressure Repeat :   100 mmHg   BP Site Repeat :   Right arm   Aracelis Singh CMA - 9/22/2020 2:19 PM CDT

## 2022-02-16 NOTE — LETTER
(Inserted Image. Unable to display)   July 14, 2021  RENUKA MCNAMARA  [address]  [place] [address]          Dear RENUKA,      Thank you for selecting [facility] for your healthcare needs.    Your Healthcare Provider has recommended that you schedule a diabetes management appointment with our Certified Diabetes Educator, Cielo Noble RD, CD, CDE.     Please bring your glucose meter and your blood glucose diary to your appointment.    Available services include:  - Education about diabetes with guidance and support   - Education on the 7 Self Care Behaviors for Diabetes Management:     Healthy Eating   portion control, label readings, carbohydrate recommendations, heart healthy guidelines, meal planning    Being Active - Physical activity guidelines     Monitoring   blood glucose targets, evaluation of blood glucose readings and lab results    Taking Medication   medication management    Problem Solving   discuss any concerns/ questions     Healthy Coping   disease progression and management    Reducing Risks   prevention strategies to help avoid potential complications related to uncontrolled diabetes  - Weight loss strategies      (FYI   Regarding office visit: In some instances, a video visit may be offered as an option.)        To schedule an appointment or if you have further questions, please contact your clinic at [phone].      Powered by [employer]    Sincerely,    Cielo Noble R.D., C.D., C.D.E.

## 2022-02-16 NOTE — TELEPHONE ENCOUNTER
---------------------  From: Cielo Noble   To: Tavares Jarquin MD;     Sent: 2/10/2021 1:13:28 PM CST  Subject: General Message     Pt calls with BG reading of 598mg/dL.  Feeling fine and is at work.  If pt starts feeling poorly (emesis, dizzy, etc) pt instructed to go to ER     Pt took 10u Lantus last evening BG this am 398mg/dL and has just continued to rise throughout the day.      Rx sent in for rapid acting insulin   1u:10gm carbohydrate    Correction factor   200 - 250 +1  251 - 300 +2  301 - 350 +3  351 - 400 +4  401 - 450 +5   451 - 500 +6  501 - 550 +7    Pt understands directions and will call again in am.

## 2022-02-16 NOTE — NURSING NOTE
Comprehensive Intake Entered On:  9/25/2020 2:47 PM CDT    Performed On:  9/25/2020 2:45 PM CDT by Xochitl Carvajal               Summary   Chief Complaint :   f/up palpitations.    Weight Measured :   257.8 lb(Converted to: 257 lb 13 oz, 116.94 kg)    Height Measured :   69 in(Converted to: 5 ft 9 in, 175.26 cm)    Body Mass Index :   38.07 kg/m2 (HI)    Body Surface Area :   2.38 m2   Systolic Blood Pressure :   146 mmHg (HI)    Diastolic Blood Pressure :   94 mmHg (HI)    Mean Arterial Pressure :   111 mmHg   Peripheral Pulse Rate :   92 bpm   BP Method :   Electronic   HR Method :   Electronic   Temperature Tympanic :   97.4 DegF(Converted to: 36.3 DegC)  (LOW)    Xochitl Carvajal - 9/25/2020 2:45 PM CDT   Health Status   Allergies Verified? :   Yes   Medication History Verified? :   Yes   Tobacco Use? :   Current every day smoker   Xochitl Carvajal - 9/25/2020 2:45 PM CDT   ID Risk Screen   Recent Travel History :   No recent travel   Family Member Travel History :   No recent travel   Other Exposure to Infectious Disease :   Unknown   Xochitl Carvajal - 9/25/2020 2:45 PM CDT

## 2022-02-16 NOTE — LETTER
(Inserted Image. Unable to display)   February 14, 2021      RENUKA MCNAMARA       [address] [place] [phone]        Dear RENUKA,    Thank you for selecting [facility] for your healthcare needs.  Below you will find the results of the recent tests done at our clinic.     Your labs are consistent with type 1 diabetes. We will discuss this during your next visit.      Result Name Current Result Previous Result Reference Range   Potassium Level (mmol/L)  4.4 2/9/2021  3.6 9/22/2020 3.5 - 5.3   Glucose Level (mg/dL) ((H)) 378 2/9/2021 ((H)) 148 9/22/2020 65 - 99   BUN (mg/dL)  7 2/9/2021  12 9/22/2020 7 - 25   AST/SGOT (unit/L)  12 2/9/2021  29 9/22/2020 10 - 40   ALT/SGPT (unit/L)  15 2/9/2021 ((H)) 54 9/22/2020 9 - 46   TSH (mIU/L)  0.82 2/9/2021  1.14 9/22/2020 0.40 - 4.50   Ur Microalbumin/Creatinine Ratio  19 2/9/2021   - <30   Hgb (gm/dL)  15.7 2/9/2021  17.4 9/22/2020 13.2 - 17.1   Platelet ((L)) 137 2/9/2021  174 9/22/2020 140 - 400   UA Protein  NEGATIVE 2/9/2021  NEGATIVE - NEGATIVE   Glutamic Acid Decarboxylase (ITA) (IU/mL) ((H)) >250 2/9/2021   - <5   IA-2 Antibody (unit/mL) ((H)) >350.0 2/9/2021   - <5.4       Please contact me or my assistant at [phone] if you have any questions.     Sincerely,        Tavares Jarquin MD        What do your labs mean?  Below is a glossary of commonly ordered labs:  LDL   Bad Cholesterol   HDL   Good Cholesterol  AST/ALT   Liver Function   Cr/Creatinine   Kidney Function  Microalbumin   Kidney Function  BUN   Kidney Function  PSA   Prostate    TSH   Thyroid Hormone  HgbA1c   Diabetes Test   Hgb (Hemoglobin)   Red Blood Cells

## 2022-02-16 NOTE — TELEPHONE ENCOUNTER
---------------------  From: Cielo Noble   To: Tavares Jarquin MD;     Sent: 2/9/2021 5:25:10 PM CST  Subject: General Message     Pt and his mother briefly seen for new dx of diabetes.  Pt does have a sister with Type I diabetes - therefore he is quite familiar with BG testing and insulin.    Labs drawn today with ITA 65 antibody to determine type of diabetes.    Pt provided with a meter and sample strips - BG in office 414mg/dL.  Pt would like the Dexcom G6 sensor like his sister - ordered supplies and pt downloaded apps.     Lantus pen sample provided will start with 10u and increase by 1u qd until am 80 - 120mg/dL but will be calling RD frequently to discuss.   Education on following a carb controlled meal plan <30gm/ meal for now to help minimize glucose fluctuations.    Will await lab results for further recommendations and education.

## 2022-02-16 NOTE — LETTER
(Inserted Image. Unable to display)   October 28, 2020        RENUKA MCNAMARA   [address], [place] [phone]        Dear RENUKA,      Thank you for selecting [facility] for your healthcare needs.    Our records indicate you are due for the following services:     Non-Fasting Labs    If you had your labs done at another facility or with Direct Access Lab Testing at [facility], please bring in a copy of the results to your next visit, mail a copy, or drop off a copy of your results to your Healthcare Provider.  Clinical Support Staff (CSS)-Only Blood Pressure Check ~ Please stop in anytime to have your blood pressure rechecked. This is a free service and no appointment necessary.    So we can best determine if your medications are effective in lowering your blood pressure, please make sure your blood pressure medicine has been in your system for at least 1-2 hours prior to coming in.  We encourage you to avoid caffeine or other stimulants prior to having your blood pressure checked and come at a time when you are not feeling rushed.     If you check your blood pressure at home, please bring in your blood pressure monitor and home blood pressure readings.  We will check your machine for accuracy and also share your home readings with your Healthcare Provider.   (FYI   Regarding office visits: In some instances, a video visit or telephone visit may be offered as an option.)    To schedule an appointment or if you have further questions, please contact your clinic at [phone].      Powered by [employer]    Sincerely,    Tavares Jarquin M.D.

## 2022-02-16 NOTE — PROGRESS NOTES
Chief Complaint    c/o heart palpitations since Sunday  History of Present Illness          Patient is a 35-year-old male new patient to the clinic.      He reports heart palpitations since Sunday.  Started September 20, 2 days ago.  He feels like his heart is fluttering.  It will skip a beat and every once a while it feels like it almost stops and then gets going again.  This is happened to him before and it happens about 3 times per year.  He has been to the doctor before for this and he gets EKGs and everything is always normal.  Resting usually makes it go away but this time it was not going away.  The palpitations are not constant.       He typically has been worked up before at [facility].        He has had high blood pressure noted in the past but he gets anxious when he comes into the doctor.  He reports that when he checks his blood pressure at [facility] it will be in the 120s over the low 90s.        He denies chest pain.        No dyspnea.        No leg swelling or leg pain.        He smokes about 1 pack/day and is interested in quitting.  He has tried Chantix in the past and it gave him very bad dreams.  He tried Wellbutrin and it made him feel like a zombie.        He takes fish oil 1 g a day and he watches what he eats.        Mom has a history of thyroid disease.  Mom also has high blood pressure.        Sister with diabetes        Dad has depression and alcohol abuse       New patient intake questionnaire reviewed       Pt reports dropping 22 lbs in the last 4-5 months.  Stopped drinking mountain dew and energy drinks.  stopped eating junk food snacks at gas stations.  has seen his energy level improve.       notes a history of a stroke possibly a few years ago.  Review of Systems          Reviewed on patient questionnaire      Negative except as listed in HPI  Physical Exam   Vitals & Measurements    T: 98.3  F (Tympanic)  HR: 80 (Peripheral)  BP: 170/100  WT: 254.6 lb           Vitals are  noted and his blood pressure is quite elevated at 170/100       Overweight        He is sweating but reports that he sweats a lot and especially when he was wearing a mask        Neck is supple with no cervical lymphadenopathy and no thyromegaly        Heart has a regular rate and rhythm with no murmurs.  No irregular beat with prolonged listening        Lungs have good air entry and there is end expiratory wheezing noted at bilateral upper lobes        Radial and posterior tibial pulses are normal and equal bilaterally        There is no pedal edema        CBC: within normal limits        BMP: elevated glucose but non fasting otherwise within normal limits        Hepatic panel: elevated total and indirect bilirubin.  elevated ALT        Troponin: negative        Chest x-ray: normal by my read.  no significant change from Feb 2018.  will be overread by radiologist.        EKG: sinus tachycardia.  interventricular conduction delay.        PHQ 9 score of 0  Assessment/Plan       Elevated glucose level (R73.09)         recommend checking fasting glucose or HbgA1c                Elevated liver enzymes (R74.8)         recommend recheck        likely Gilbert's syndrome        recommended eating cruciferous veggies                HTN (hypertension) (I10)         started on low dose metoprolol.  he is sensitive to meds.  hoping this will help with BP and palpitations.        recheck in 2 days with primary MD         Ordered:          metoprolol, = 1 tab(s) ( 25 mg ), Oral, daily, # 30 tab(s), 0 Refill(s), Type: Maintenance, Pharmacy: [facility] #[id number], 1 tab(s) Oral daily, 69, in, 09/22/20 13:06:00 CDT, Height Measured, 254.6, lb, 09/22/20 13:06:00 CDT, Weight Measured, (Ordered)          98491 ecg routine ecg w/least 12 lds w/i+r (Charge), Quantity: 1, HTN (hypertension)  Palpitations  Tobacco abuse          72477 ecg routine ecg w/least 12 lds w/i+r (Charge), Quantity: 1, Palpitations  HTN (hypertension)           Basic Metabolic Panel (Request), Priority: Urgent, HTN (hypertension)  Palpitations  Tobacco abuse          CBC w/o Diff (Request), Priority: Urgent, HTN (hypertension)  Palpitations  Tobacco abuse          Hepatic Function Panel (Request), Priority: Urgent, HTN (hypertension)  Palpitations  Tobacco abuse          Magnesium* ([facility]), Specimen Type: Serum, Collection Date: 09/22/20 13:26:00 CDT          Troponin-I (Request), Priority: Urgent, HTN (hypertension)  Palpitations  Tobacco abuse          TSH* ([facility]), Specimen Type: Serum, Collection Date: 09/22/20 13:26:00 CDT          XR Chest PA/Lat (Request), HTN (hypertension)  Palpitations  Tobacco abuse                Palpitations (R00.2)         Ordered:          54887 ecg routine ecg w/least 12 lds w/i+r (Charge), Quantity: 1, HTN (hypertension)  Palpitations  Tobacco abuse          56747 ecg routine ecg w/least 12 lds w/i+r (Charge), Quantity: 1, Palpitations  HTN (hypertension)          Basic Metabolic Panel (Request), Priority: Urgent, HTN (hypertension)  Palpitations  Tobacco abuse          CBC w/o Diff (Request), Priority: Urgent, HTN (hypertension)  Palpitations  Tobacco abuse          Hepatic Function Panel (Request), Priority: Urgent, HTN (hypertension)  Palpitations  Tobacco abuse          Magnesium* ([facility]), Specimen Type: Serum, Collection Date: 09/22/20 13:26:00 CDT          Troponin-I (Request), Priority: Urgent, HTN (hypertension)  Palpitations  Tobacco abuse          TSH* ([facility]), Specimen Type: Serum, Collection Date: 09/22/20 13:26:00 CDT          XR Chest PA/Lat (Request), HTN (hypertension)  Palpitations  Tobacco abuse                Supraventricular tachycardia (I47.1)         recommend considering echo or stress test with his abnormal EKG                Tobacco abuse (Z72.0)         discussed quitting.  he is interested.        has had bad experiences with chantix and wellbutrin        recommended trying nicotine  patches and/or gum and then tapering down         Ordered:          42029 ecg routine ecg w/least 12 lds w/i+r (Charge), Quantity: 1, HTN (hypertension)  Palpitations  Tobacco abuse          Basic Metabolic Panel (Request), Priority: Urgent, HTN (hypertension)  Palpitations  Tobacco abuse          CBC w/o Diff (Request), Priority: Urgent, HTN (hypertension)  Palpitations  Tobacco abuse          Hepatic Function Panel (Request), Priority: Urgent, HTN (hypertension)  Palpitations  Tobacco abuse          Magnesium* ([facility]), Specimen Type: Serum, Collection Date: 09/22/20 13:26:00 CDT          Troponin-I (Request), Priority: Urgent, HTN (hypertension)  Palpitations  Tobacco abuse          TSH* ([facility]), Specimen Type: Serum, Collection Date: 09/22/20 13:26:00 CDT          XR Chest PA/Lat (Request), HTN (hypertension)  Palpitations  Tobacco abuse           Patient Information     Name:RENUKA MCNAMARA      Address:      [address] [id number]     Sex:Male     YOB: 1985     [phone]     Emergency Contact:DECLINED, DECLINED     MRN:[id number]     FIN:[id number]     Location:[facility]     Date of Service:09/22/2020      Primary Care Physician:       NONE ,       Attending Physician:       Mago Duff MD, [phone]  Problem List/Past Medical History    Ongoing     Obesity, Unspecified     Pure Hyperglyceridemia     Tobacco Use Disorder     Unspecified Essential Hypertension    Historical     CERVICALGIA  Medications    metoprolol succinate 25 mg oral tablet, extended release, 25 mg= 1 tab(s), Oral, daily  Allergies    No Known Medication Allergies    No known allergies  Social History    Smoking Status     Current every day smoker  Lab Results       Lab Results (Last 4 results within 90 days)        Sodium Level: 141 [135 mmol/L - 145 mmol/L] (09/22/20 13:42:00)       Potassium Level: 3.6 [3.5 mmol/L - 5 mmol/L] (09/22/20 13:42:00)        Chloride Level: 106 [98 mmol/L - 110 mmol/L] (09/22/20 13:42:00)       CO2 Level: 23 [21 mmol/L - 31 mmol/L] (09/22/20 13:42:00)       AGAP: 12 [5  - 18] (09/22/20 13:42:00)       Glucose Level: 148 High [65 mg/dL - 100 mg/dL] (09/22/20 13:42:00)       BUN: 12 [8 mg/dL - 25 mg/dL] (09/22/20 13:42:00)       Creatinine Level: 0.99 [0.72 mg/dL - 1.25 mg/dL] (09/22/20 13:42:00)       BUN/Creat Ratio: 12 [10  - 20] (09/22/20 13:42:00)       eGFR : >60 (09/22/20 13:42:00)       eGFR Non-: >60 (09/22/20 13:42:00)       Calcium Level: 8.9 [8.5 mg/dL - 10.5 mg/dL] (09/22/20 13:42:00)       Bilirubin Total: 1.6 High [0.2 mg/dL - 1.2 mg/dL] (09/22/20 13:42:00)       Bilirubin Direct: 0.4 [0.1 mg/dL - 0.5 mg/dL] (09/22/20 13:42:00)       Bilirubin Indirect: 1.2 High [0.2 mg/dL - 0.8 mg/dL] (09/22/20 13:42:00)       Alkaline Phosphatase: 103 [50 IU/L - 136 IU/L] (09/22/20 13:42:00)       AST/SGOT: 29 [2 IU/L - 40 IU/L] (09/22/20 13:42:00)       ALT/SGPT: 54 High [8 IU/L - 45 IU/L] (09/22/20 13:42:00)       Protein Total: 7.4 [6 g/dL - 8 g/dL] (09/22/20 13:42:00)       Albumin Level: 4.4 [3.5 g/dL - 5.2 g/dL] (09/22/20 13:42:00)       Globulin: 3.0 [2 g/dL - 3.7 g/dL] (09/22/20 13:42:00)       A/G Ratio: 1.5 [1  - 2] (09/22/20 13:42:00)       Troponin I: <0.010 (09/22/20 13:42:00)       WBC: 9.2 [4.5  - 11] (09/22/20 13:42:00)       RBC: 5.62 [4.3  - 5.9] (09/22/20 13:42:00)       Hgb: 17.4 [13.5 g/dL - 17.5 g/dL] (09/22/20 13:42:00)       Hct: 48.2 [37 % - 53 %] (09/22/20 13:42:00)       MCV: 86 [80 fL - 100 fL] (09/22/20 13:42:00)       MCH: 31.0 [26 pg - 34 pg] (09/22/20 13:42:00)       MCHC: 36.1 High [32 g/dL - 36 g/dL] (09/22/20 13:42:00)       RDW: 13.3 [11.5 % - 15.5 %] (09/22/20 13:42:00)       Platelet: 174 [140  - 440] (09/22/20 13:42:00)       MPV: 11.8 High [6.5 fL - 11 fL] (09/22/20 13:42:00)

## 2022-02-16 NOTE — PROGRESS NOTES
Patient:   RENUKA MCNAMARA            MRN: [id number]            FIN: [id number]               Age:   35 years     Sex:  Male     :  1985   Associated Diagnoses:   Essential hypertension; Frequent PVCs   Author:   Tavares Jarquin MD      Visit Information      Date of Service: 2020 02:40 pm  Performing Location: [facility]  Encounter#: [id number]      Primary Care Provider (PCP):  NONE ,       Referring Provider:  Tavares Jarquin MD    NPI# [id number]      Chief Complaint   2020 2:45 PM CDT    f/up palpitations.        History of Present Illness   Patient is here for follow-up.  He took 1 dose of the metoprolol and felt very very ill tired fatigue can stay awake he had terrible side effect so he stopped it.  His palpitations go back many many years.  He is been checked out in the past and things have been okay.  He notes things are happening more more frequently where he will have multiple extra beats of skipped beats and he feels tightness in his chest.  He does smoke.  Has alcohol few times a week.  He was drinking multiple energy drinks a day but stopped those a few months ago.  Family history of early heart disease in his paternal grandfather in his 40s.  Father  from suicide.          Review of Systems   Constitutional:  Negative.    Eye:  Negative.    Ear/Nose/Mouth/Throat:  Negative.    Respiratory:  Negative.    Cardiovascular:  Negative except as documented in history of present illness.    Gastrointestinal:  Negative.    Genitourinary:  Negative.    Hematology/Lymphatics:  Negative.    Endocrine:  Negative.    Immunologic:  Negative.    Musculoskeletal:  Negative.    Integumentary:  Negative.    Neurologic:  Negative.       Health Status   Allergies:    Allergic Reactions (Selected)  Severity Not Documented  No known allergies (No reactions were documented)  No Known Medication Allergies   Medications:  (Selected)   Prescriptions  Prescribed  losartan 50 mg oral  tablet: = 1 tab(s) ( 50 mg ), Oral, daily, # 90 tab(s), 1 Refill(s), Type: Maintenance, Pharmacy: [facility] #[id number], 1 tab(s) Oral daily, 69, in, 09/25/20 14:45:00 CDT, Height Measured, 257.8, lb, 09/25/20 14:45:00 CDT, Weight Measured   Problem list:    All Problems  Obesity, Unspecified / ICD-9-.00 / Probable  Pure Hyperglyceridemia / ICD-9-.1 / Confirmed  Tobacco Use Disorder / ICD-9-.1 / Confirmed  Unspecified Essential Hypertension / ICD-9-.9 / Confirmed      Histories   Past Medical History:    Resolved  CERVICALGIA (723.1):  Resolved.   Family History:    Diabetes mellitus  Sister  High blood pressure  Mother  Alcohol abuse  Father  Depression  Father  Hyperthyroidism  Mother     Procedure history:    No known procedures (SNOMED CT [id number]).   Social History:        Electronic Cigarette/Vaping Assessment            Electronic Cigarette Use: Never.      Alcohol Assessment            Current, Liquor (Hard) (1.5 oz), 1-2 times per week, 4 drinks/episode average.  Ready to change: No.      Tobacco Assessment            10 or more cigarettes (1/2 pack or more)/day in last 30 days, Cigarettes, 10 year(s).      Substance Abuse Assessment            Never      Employment and Education Assessment            Employed      Home and Environment Assessment            Marital status: Single.  Living situation: Home/Independent.  Injuries/Abuse/Neglect in household: No.  Feels               unsafe at home: No.  Family/Friends available for support: Yes.      Nutrition and Health Assessment            Type of diet: Regular.  Wants to lose weight: Yes.      Exercise and Physical Activity Assessment            Exercise frequency: 3-4 times/week.      Sexual Assessment            Sexually active: Yes.  Identifies as male, Sexual orientation: Straight or heterosexual.  History of STD: No.               Uses condoms: Yes.  History of sexual abuse: No.        Physical Examination   Measurements  from flowsheet : Measurements   9/25/2020 2:45 PM CDT Height Measured - Standard 69 in    Weight Measured - Standard 257.8 lb    BSA 2.38 m2    Body Mass Index 38.07 kg/m2  HI      General:  Alert and oriented, No acute distress.    Neck:  Supple, Non-tender.    Respiratory:  Lungs are clear to auscultation, Respirations are non-labored.    Cardiovascular:  Normal rate, Regular rhythm.    Gastrointestinal:  Soft, Non-tender.    Neurologic:  Alert, Oriented.       Impression and Plan   Diagnosis     Essential hypertension (XGY46-YH I10).     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     Course:  Progressing as expected.    Plan:  Patient with episodes of what appear to be frequent ectopy with significant hypertension we discussed his lifestyle and how it is affecting it.  We will keep him off his metoprolol but start him on losartan we warned of side effects he will follow-up labs in a month and recheck with us in 3 months.  We will get cardiology consultation because of these recurrent episodes which are increasing in frequency.  We will hold off in any sort of event monitoring stress testing or further imaging until he is seen cardiology.  .    Patient Instructions:       Counseled: Patient, Regarding diagnosis, Regarding treatment, Regarding medications.

## 2022-02-16 NOTE — TELEPHONE ENCOUNTER
Entered by Kennedi Roldan MA on December 08, 2021 9:30:14 AM CST  ---------------------  From: Kennedi Roldan MA   To: [facility] #[id number]    Sent: 12/8/2021 9:30:13 AM CST  Subject: Medication Management     ** Not Approved: Refill not appropriate, refilled by other means **  Lancets (B-D KIKE 2ND GEN PEN NDL [id number])  USE FOUR TO SIX TIMES DAILY  Qty:  100 EA        Days Supply:  16        Refills:  0          Substitutions Allowed     Route To Pharmacy - [facility] #[id number]   Signed by Kennedi Roldan MA            ------------------------------------------  From: [facility] #[id number]  To: Tavares Jarquin MD  Sent: December 5, 2021 11:55:20 AM CST  Subject: Medication Management  Due: November 16, 2021 3:34:48 PM CST     ** On Hold Pending Signature **     Dispensed Drug: B-D KIKE 2ND GEN PEN NDL [id number], USE FOUR TO SIX TIMES DAILY  Quantity: 100 EA  Days Supply: 16  Refills: 0  Substitutions Allowed  Notes from Pharmacy:  ------------------------------------------

## 2022-02-16 NOTE — PROGRESS NOTES
Patient:   RENUKA MCNAMARA            MRN: [id number]            FIN: [id number]               Age:   35 years     Sex:  Male     :  1985   Associated Diagnoses:   Supraventricular tachycardia   Author:   Mago Duff MD      Procedure   EKG procedure   Date:  2020.     Indication: palpitations.     EKG findings   Interpretation: Mago Duff MD.     Rhythm: heart rate  104  beats/min.     Axis: normal axis, normal configuration.     P waves: inverted.     QRS complex: conduction delay, mild to moderate.     ST-T-U complex: normal.     Discussed: with patient.        Impression and Plan   Diagnosis     Supraventricular tachycardia (OEV47-MB I47.1).

## 2022-02-16 NOTE — RESULTS
(Inserted Image. Unable to display)          (Inserted Image. Unable to display)     [address], [place] [address]  Phone [phone]  Fax [phone]  HOLTER MONITOR REPORT    RENUKA MCNAMARA : 1985  Date of Exam: 10/06/2020 at 3:00 p.m.  MRN: [id number]  Ordering HCP:  Haris Post MD      INDICATION:  Palpitations.    FINDINGS: The Holter monitor recording period was 23 hours and 49 minutes with analysis time of 23 hours and 16 minutes.       The rhythm is sinus.  Average rate of 93.  Minimum rate of 57.  Maximum rate of 127.  No pauses.                     No ventricular ectopics.      Isolated supraventricular ectopics number 88.       There are 6 symptomatic episodes with symptoms including  palpitation/ rapid heartrate/ flutter  and  fluttering in chest .  No dysrhythmia is seen with patient episodes.        IMPRESSION:  Negative Holter monitor.          Morro Ahmadi MD, FACP  Interpreting HCP                    MARCO A/areli   D:  10/08/2020                                                                          T:  10/08/2020    HOLTER MONITOR REPORT

## 2022-02-16 NOTE — LETTER
(Inserted Image. Unable to display)   December 09, 2020        RENUKA MCNAMARA   [address], [place] [id number]        Dear RENUKA,      Thank you for selecting [facility] for your healthcare needs.    Our records indicate you are due for the following services:     Clinical Support Staff (CSS)-Only Blood Pressure Check ~ Please stop in anytime to have your blood pressure rechecked. This is a free service and no appointment necessary.    So we can best determine if your medications are effective in lowering your blood pressure, please make sure your blood pressure medicine has been in your system for at least 1-2 hours prior to coming in.  We encourage you to avoid caffeine or other stimulants prior to having your blood pressure checked and come at a time when you are not feeling rushed.     If you check your blood pressure at home, please bring in your blood pressure monitor and home blood pressure readings.  We will check your machine for accuracy and also share your home readings with your Healthcare Provider.   Non-Fasting Labs    If you had your labs done at another facility or with Direct Access Lab Testing at [facility], please bring in a copy of the results to your next visit, mail a copy, or drop off a copy of your results to your Healthcare Provider.    (FYI   Regarding office visits: In some instances, a video visit or telephone visit may be offered as an option.)    To schedule an appointment or if you have further questions, please contact your clinic at [phone].      Powered by [employer]    Sincerely,    Tavares Jarquin M.D.

## 2022-02-16 NOTE — PROGRESS NOTES
Patient:   RENUKA MCNAMARA            MRN: [id number]            FIN: [id number]               Age:   35 years     Sex:  Male     :  1985   Associated Diagnoses:   None   Author:   Mago Duff MD       -   Today's date:    2020.        -   To whom it may concern:        This patient Was seen in my office on  2020.  .     Please excuse him/ her from work, today.    He will need to have a follow up appointment later this week.       -   Best regards,   Mago Duff MD

## 2022-02-16 NOTE — TELEPHONE ENCOUNTER
Entered by Lindsay Orr on October 19, 2020 11:55:26 AM CDT  Please advise. Metoprolol discontinued off of med list.       ------------------------------------------  From: [facility] #[id number]  To: Mago Duff MD  Sent: October 19, 2020 3:46:46 AM CDT  Subject: Medication Management  Due: October 8, 2020 2:04:31 PM CDT     ** On Hold Pending Signature **     Dispensed Drug: metoprolol (Metoprolol Succinate ER 25 mg oral tablet, extended release), TAKE 1 TABLET BY MOUTH DAILY  Quantity: 30 tab(s)  Days Supply: 30  Refills: 0  Substitutions Allowed  Notes from Pharmacy:  ---------------------------------------------------------------  From: Lindsay Orr ([facility] Pool ([id number]))   To: [employer] Pool ([id number][facility]);     Sent: 10/19/2020 11:55:40 AM CDT  Subject: FW: Medication Management   Due Date/Time: 10/20/2020 3:46:00 AM CDT---------------------  From: Jennie Guillen CMA   To: [facility] #[id number]    Sent: 10/19/2020 1:41:17 PM CDT  Subject: FW: Medication Management     ** Not Approved: Refill not appropriate, med was discontinued last month **  metoprolol (METOPROLOL ER SUCCINATE 25MG TABS)  TAKE 1 TABLET BY MOUTH DAILY  Qty:  30 tab(s)        Days Supply:  30        Refills:  0          Substitutions Allowed     Route To Pharmacy - [facility] #[id number]   Signed by Jennie Guillen CMA

## 2022-02-16 NOTE — NURSING NOTE
Comprehensive Intake Entered On:  2/9/2021 3:21 PM CST    Performed On:  2/9/2021 3:19 PM CST by Xochitl Carvajal               Summary   Chief Complaint :   ER f/up   Weight Measured :   225.5 lb(Converted to: 225 lb 8 oz, 102.285 kg)    Height Measured :   69 in(Converted to: 5 ft 9 in, 175.26 cm)    Body Mass Index :   33.3 kg/m2 (HI)    Body Surface Area :   2.23 m2   Systolic Blood Pressure :   136 mmHg (HI)    Diastolic Blood Pressure :   77 mmHg   Mean Arterial Pressure :   97 mmHg   Peripheral Pulse Rate :   89 bpm   BP Method :   Electronic   HR Method :   Electronic   Temperature Tympanic :   97.8 DegF(Converted to: 36.6 DegC)  (LOW)    Xochitl Carvajal - 2/9/2021 3:19 PM CST   Health Status   Allergies Verified? :   Yes   Medication History Verified? :   Yes   Pre-Visit Planning Status :   Completed   Tobacco Use? :   Current every day smoker   Xochitl Carvajal - 2/9/2021 3:19 PM CST   ID Risk Screen   Recent Travel History :   Last travel within 7 days   Recent Travel Location :   [place]   Family Member Travel History :   No recent travel   Other Exposure to Infectious Disease :   Unknown   COVID-19 Testing Status :   No positive COVID-19 test   Xochitl Carvajal - 2/9/2021 3:19 PM CST   Social History   Social History   (As Of: 2/9/2021 3:21:28 PM CST)   Alcohol:        Current, Liquor (Hard) (1.5 oz), 1-2 times per week, 4 drinks/episode average.  Ready to change: No.   (Last Updated: 9/24/2020 2:20:34 PM CDT by Kimberly Up)          Tobacco:        10 or more cigarettes (1/2 pack or more)/day in last 30 days, Cigarettes, 10 year(s).   (Last Updated: 9/24/2020 2:20:51 PM CDT by Kimberly Up)          Electronic Cigarette/Vaping:        Electronic Cigarette Use: Never.   (Last Updated: 9/24/2020 2:20:56 PM CDT by Kimberly Up)          Substance Abuse:        Never   (Last Updated: 9/24/2020 2:21:00 PM CDT by Kimberly Up)          Employment/School:        Employed   (Last  Updated: 9/24/2020 2:21:17 PM CDT by Kimberly Up)          Home/Environment:        Marital status: Single.  Living situation: Home/Independent.  Injuries/Abuse/Neglect in household: No.  Feels unsafe at home: No.  Family/Friends available for support: Yes.   (Last Updated: 9/24/2020 2:21:32 PM CDT by Kimberly Up)          Nutrition/Health:        Type of diet: Regular.  Wants to lose weight: Yes.   (Last Updated: 9/24/2020 2:21:46 PM CDT by Kimberly Up)          Exercise:        Exercise frequency: 3-4 times/week.   (Last Updated: 9/24/2020 2:21:54 PM CDT by Kimberly Up)          Sexual:        Sexually active: Yes.  Identifies as male, Sexual orientation: Straight or heterosexual.  History of STD: No.  Uses condoms: Yes.  History of sexual abuse: No.   (Last Updated: 9/24/2020 2:22:10 PM CDT by Kimberly Up)

## 2022-02-16 NOTE — CARE COORDINATION
Patient:   RENUKA MCNAMARA            MRN: [id number]            FIN: [id number]               Age:   35 years     Sex:  Male     :  1985   Associated Diagnoses:   None   Author:   Kiley Benoit CMA      Care Coordinator ER Discharge Note      Sources of Information:  [ ] Patient, family member, or caregiver (Please list): LMTCB at 0841am on 21. LMTCB at 1000am on 21.   Does pt have any questions or concerns from discharge? Does he have any medication questions?  Appointment is set up for 320 21.  [ ] Hospital discharge summary  [ ] Hospital fax  [X ] List of recent hospitalizations or ED visits  [ ] Other:     Discharged From: [facility]  Discharge Date:21    Diagnosis/Problem: Hyperglyceimia, New Onset DM 2    Medication Changes: [ ] Yes [X ] No   Medication List Updated: [ ] Yes [X ] No    Needs Referral or Lab: [ ] Yes [X ] No: DM Labs, DM ED visit, DM Eye Dr. Egan    Needs Follow-up Appointment:  [X ] Within 7 days of discharge (highly complex visit)  [ ] Within 14 days of discharge (moderately complex visit)    Appointment Made With: TFS  Date: 21    Additional Information Needed and Requested:  [ ] Yes:  [ ] No

## 2022-02-16 NOTE — TELEPHONE ENCOUNTER
Entered by Virginie Koo LPN on February 19, 2021 10:19:23 AM CST  ---------------------  From: Virginie Koo LPN   To: [facility]    Sent: 2/19/2021 10:19:23 AM CST  Subject: Medication Management     ** Submitted: **  Order:Miscellaneous Prescription (T:SLIM X2 INSULIN PUMP/CONTROL-IQ TECHNOLOGY 7.4 CNTRL7.4 DEVICE)  See Instructions  USE AS DIRECTED  Qty:  1 EA        Days Supply:  30        Refills:  0          Substitutions Allowed     Route To Pharmacy - [facility]    Signed by Virginie Koo LPN  2/19/2021 4:19:00 PM [facility]    ** Not Approved:  **  Miscellaneous Prescription (T:SLIM X2 INSULIN PUMP/CONTROL-IQ TECHNOLOGY 7.4 CNTRL7.4 DEVICE)  USE AS DIRECTED  Qty:  1 EA        Days Supply:  30        Refills:  0          Substitutions Allowed     Route To Pharmacy - [facility]   Signed by Virginie Koo LPN          Med Refill    Date of last office visit and reason:  02/09/21    Date of last Med Check or Px:  02/09/21  Date of last labs pertaining to med:  02/09/21    RTC order in chart:  yes f/u in one month    For protocol refill, has patient been contacted?  _      ** Patient matched by Virginie Koo LPN on 2/19/2021 10:17:19 AM CST **      ------------------------------------------  From: [facility]  To: Tavares Jarquin MD  Sent: February 18, 2021 6:48:46 PM CST  Subject: Medication Management  Due: February 17, 2021 2:12:22 PM CST     ** On Hold Pending Signature **     Drug: T:SLIM X2 INSULIN PUMP/CONTROL-IQ TECHNOLOGY 7.4 CNTRL7.4 DEVICE, USE AS DIRECTED  Quantity: 1 EA  Days Supply: 30  Refills: 0  Substitutions Allowed  Notes from Pharmacy:     Dispensed Drug: T:SLIM X2 INSULIN PUMP/CONTROL-IQ TECHNOLOGY 7.4 CNTRL7.4 DEVICE, USE AS DIRECTED  Quantity: 1 EA  Days Supply: 30  Refills: 0  Substitutions Allowed  Notes from Pharmacy:  ------------------------------------------

## 2022-03-02 NOTE — TELEPHONE ENCOUNTER
---------------------  From: Cielo Noble   To: Tavares Jarquin MD;     Sent: 2/9/2022 2:07:27 PM CST  Subject: General Message     RUPERTO  Pt received pump supplies.  Overdue for labs, appt.  I would not give pump settings to pt without appointment.

## 2022-03-08 ENCOUNTER — MEDICAL CORRESPONDENCE (OUTPATIENT)
Dept: HEALTH INFORMATION MANAGEMENT | Facility: CLINIC | Age: 37
End: 2022-03-08

## 2022-03-22 ENCOUNTER — TELEPHONE (OUTPATIENT)
Dept: FAMILY MEDICINE | Facility: CLINIC | Age: 37
End: 2022-03-22

## 2022-03-22 NOTE — TELEPHONE ENCOUNTER
I spoke to pt in regards to making an appt per your last note in [employer]. Pt states does not have insurance currently and is just about out of his supplies. He states [facility] has been reaching out to us multiple times over the last few weeks but I have not seen anything. Please advise on supply request.

## 2022-03-22 NOTE — TELEPHONE ENCOUNTER
Reason for Call:   Diabetes supplies     Order or referral being requested: tubing and CGM cartridges     Date needed: as soon as possible    Has the patient been seen by the PCP for this problem? YES    Additional comments: Please send order for supplies to [employer] phone number is [phone] supply company states they have sent 3 requests patient is needing asap.     Phone number Patient can be reached at:  Home number on file [phone] (home)    Best Time:  any    Can we leave a detailed message on this number?  NO    Call taken on 3/22/2022 at 11:05 AM by Kika Dalton

## 2022-03-22 NOTE — TELEPHONE ENCOUNTER
I haven't received anything from [facility] - they are likely faxing [facility].  Please call [facility] if you have time otherwise I can tomorrow.  I need to leave now.

## 2022-03-23 NOTE — TELEPHONE ENCOUNTER
Called [facility].  They had not faxed us anything.  Provided verbal orders to [employer] for Dexcom and pump supplies.  Order # [id number]  Pt will receive them within a week.      Called pt to update him on order status.      >30 min time spent on calls    Cielo Noble RD on 3/23/2022 at 2:14 PM

## 2022-03-24 ENCOUNTER — MEDICAL CORRESPONDENCE (OUTPATIENT)
Dept: HEALTH INFORMATION MANAGEMENT | Facility: CLINIC | Age: 37
End: 2022-03-24

## 2022-04-29 ENCOUNTER — MEDICAL CORRESPONDENCE (OUTPATIENT)
Dept: HEALTH INFORMATION MANAGEMENT | Facility: CLINIC | Age: 37
End: 2022-04-29

## 2022-07-12 DIAGNOSIS — E11.9 DIABETES MELLITUS (H): Primary | ICD-10-CM

## 2022-07-14 RX ORDER — INSULIN LISPRO 100 [IU]/ML
INJECTION, SOLUTION INTRAVENOUS; SUBCUTANEOUS
Qty: 15 ML | OUTPATIENT
Start: 2022-07-14

## 2022-07-14 RX ORDER — INSULIN LISPRO 100 [IU]/ML
INJECTION, SOLUTION INTRAVENOUS; SUBCUTANEOUS
Qty: 50 ML | Refills: 0 | Status: CANCELLED | OUTPATIENT
Start: 2022-07-14

## 2022-07-14 NOTE — TELEPHONE ENCOUNTER
Routing refill request to provider for review/approval because:  Drug does not pass the [facility] refill protocol     Last office visit was 07/07/2021 - TC PLEASE reach out and help patient make appointment    Per [employer]:  Insulin Lispro (HumaLOG 100 units/mL injectable solution)  using 45 units via insulin pump daily     Diabetes (E11.9)     Short Acting Insulin Protocol Failed     Serum creatinine on file in past 12 months    HgbA1C in past 3 or 6 months    Medication is active on med list    Recent (6 mo) or future (30 days) visit within the authorizing provider's specialty             LIS Cannon  [facility]

## 2022-07-18 NOTE — TELEPHONE ENCOUNTER
Patient has an appt on Fri 7/22/2022.  Please fill as patient has been out for days and is a Type 1 diabetic.  Please address Urgent.

## 2022-07-19 DIAGNOSIS — E11.9 DIABETES MELLITUS (H): Primary | ICD-10-CM

## 2022-07-19 PROBLEM — E66.9 OBESITY: Status: ACTIVE | Noted: 2022-07-19

## 2022-07-19 RX ORDER — INSULIN LISPRO 100 [IU]/ML
INJECTION, SOLUTION INTRAVENOUS; SUBCUTANEOUS
Qty: 50 ML | Refills: 0 | Status: SHIPPED | OUTPATIENT
Start: 2022-07-19 | End: 2022-11-08

## 2022-07-19 RX ORDER — INSULIN LISPRO 100 [IU]/ML
INJECTION, SOLUTION INTRAVENOUS; SUBCUTANEOUS
Qty: 15 ML | Refills: 0 | Status: SHIPPED | OUTPATIENT
Start: 2022-07-19 | End: 2022-11-08

## 2022-07-19 NOTE — TELEPHONE ENCOUNTER
Pt has an appt on 7/22/22 with TFS. Has been out of insulin. Please advise if ok for elaine refill to get through to appt. Order attached.

## 2022-07-19 NOTE — TELEPHONE ENCOUNTER
Patient called and pharmacy is telling patient his Humalog is not being refilled because TFS is denying it.  Patient is a type 1 diabetic and it's been over a week since he's had his insulin    Patient already scheduled his appointment.  It is on Friday, 7.22.22.

## 2022-07-19 NOTE — TELEPHONE ENCOUNTER
Patient called. He was requesting insulin for his pump and pharmacy was sending refill request for kwik pen.    Patient has appointment 7/22/22.    Please review for refilling RX for insulin for pump.

## 2022-11-01 ENCOUNTER — TELEPHONE (OUTPATIENT)
Dept: EDUCATION SERVICES | Facility: CLINIC | Age: 37
End: 2022-11-01

## 2022-11-01 NOTE — TELEPHONE ENCOUNTER
LVM informing patient to schedule the appt with Shelby Noble after seeing Dr. Jarquin to f/u on labs.

## 2022-11-01 NOTE — TELEPHONE ENCOUNTER
Reason for Call:  Other appointment    Detailed comments: PT has diab check with TFS on 11/8 - also needs to schedule with Ema Noble -pt was wanting to know if he should schedule with Ema before or after the apt with TFS - please contact pt to set up with ema.    Phone Number Patient can be reached at: Home number on file [phone] (home)    Best Time: any    Can we leave a detailed message on this number? Not Applicable    Call taken on 11/1/2022 at 12:36 PM by Olamide Duff

## 2022-11-08 ENCOUNTER — OFFICE VISIT (OUTPATIENT)
Dept: FAMILY MEDICINE | Facility: CLINIC | Age: 37
End: 2022-11-08
Payer: COMMERCIAL

## 2022-11-08 VITALS
HEIGHT: 69 IN | DIASTOLIC BLOOD PRESSURE: 100 MMHG | SYSTOLIC BLOOD PRESSURE: 128 MMHG | WEIGHT: 203 LBS | TEMPERATURE: 97.1 F | BODY MASS INDEX: 30.07 KG/M2 | HEART RATE: 84 BPM

## 2022-11-08 DIAGNOSIS — Z79.4 DIABETES MELLITUS DUE TO UNDERLYING CONDITION WITHOUT COMPLICATION, WITH LONG-TERM CURRENT USE OF INSULIN (H): Primary | ICD-10-CM

## 2022-11-08 DIAGNOSIS — I10 BENIGN ESSENTIAL HYPERTENSION: ICD-10-CM

## 2022-11-08 DIAGNOSIS — Z82.49 FAMILY HISTORY OF ISCHEMIC HEART DISEASE: ICD-10-CM

## 2022-11-08 DIAGNOSIS — E08.9 DIABETES MELLITUS DUE TO UNDERLYING CONDITION WITHOUT COMPLICATION, WITH LONG-TERM CURRENT USE OF INSULIN (H): Primary | ICD-10-CM

## 2022-11-08 DIAGNOSIS — E78.1 PURE HYPERTRIGLYCERIDEMIA: ICD-10-CM

## 2022-11-08 LAB
ALBUMIN SERPL BCG-MCNC: 4.4 G/DL (ref 3.5–5.2)
ALP SERPL-CCNC: 92 U/L (ref 40–129)
ALT SERPL W P-5'-P-CCNC: 19 U/L (ref 10–50)
ANION GAP SERPL CALCULATED.3IONS-SCNC: 18 MMOL/L (ref 7–15)
AST SERPL W P-5'-P-CCNC: 14 U/L (ref 10–50)
BASOPHILS # BLD AUTO: 0.1 10E3/UL (ref 0–0.2)
BASOPHILS NFR BLD AUTO: 1 %
BILIRUB SERPL-MCNC: 1.4 MG/DL
BUN SERPL-MCNC: 21 MG/DL (ref 6–20)
CALCIUM SERPL-MCNC: 9.2 MG/DL (ref 8.6–10)
CHLORIDE SERPL-SCNC: 90 MMOL/L (ref 98–107)
CHOLEST SERPL-MCNC: 282 MG/DL
CREAT SERPL-MCNC: 0.62 MG/DL (ref 0.67–1.17)
CREAT UR-MCNC: 26.8 MG/DL
DEPRECATED HCO3 PLAS-SCNC: 23 MMOL/L (ref 22–29)
EOSINOPHIL # BLD AUTO: 0.1 10E3/UL (ref 0–0.7)
EOSINOPHIL NFR BLD AUTO: 2 %
ERYTHROCYTE [DISTWIDTH] IN BLOOD BY AUTOMATED COUNT: 12.1 % (ref 10–15)
GFR SERPL CREATININE-BSD FRML MDRD: >90 ML/MIN/1.73M2
GLUCOSE SERPL-MCNC: 498 MG/DL (ref 70–99)
HBA1C MFR BLD: 14.3 % (ref 0–5.6)
HCT VFR BLD AUTO: 43.4 % (ref 40–53)
HDLC SERPL-MCNC: 23 MG/DL
HGB BLD-MCNC: 15.8 G/DL (ref 13.3–17.7)
IMM GRANULOCYTES # BLD: 0 10E3/UL
IMM GRANULOCYTES NFR BLD: 0 %
LDLC SERPL CALC-MCNC: ABNORMAL MG/DL
LDLC SERPL DIRECT ASSAY-MCNC: 55 MG/DL
LYMPHOCYTES # BLD AUTO: 2 10E3/UL (ref 0.8–5.3)
LYMPHOCYTES NFR BLD AUTO: 28 %
MCH RBC QN AUTO: 30.2 PG (ref 26.5–33)
MCHC RBC AUTO-ENTMCNC: 36.4 G/DL (ref 31.5–36.5)
MCV RBC AUTO: 83 FL (ref 78–100)
MICROALBUMIN UR-MCNC: 54 MG/L
MICROALBUMIN/CREAT UR: 201.49 MG/G CR (ref 0–17)
MONOCYTES # BLD AUTO: 0.6 10E3/UL (ref 0–1.3)
MONOCYTES NFR BLD AUTO: 8 %
NEUTROPHILS # BLD AUTO: 4.4 10E3/UL (ref 1.6–8.3)
NEUTROPHILS NFR BLD AUTO: 62 %
NONHDLC SERPL-MCNC: 259 MG/DL
PLATELET # BLD AUTO: 174 10E3/UL (ref 150–450)
POTASSIUM SERPL-SCNC: 4.2 MMOL/L (ref 3.4–5.3)
PROT SERPL-MCNC: 6.9 G/DL (ref 6.4–8.3)
RBC # BLD AUTO: 5.24 10E6/UL (ref 4.4–5.9)
SODIUM SERPL-SCNC: 131 MMOL/L (ref 136–145)
TRIGL SERPL-MCNC: 1509 MG/DL
WBC # BLD AUTO: 7.2 10E3/UL (ref 4–11)

## 2022-11-08 PROCEDURE — 36415 COLL VENOUS BLD VENIPUNCTURE: CPT | Performed by: FAMILY MEDICINE

## 2022-11-08 PROCEDURE — 83036 HEMOGLOBIN GLYCOSYLATED A1C: CPT | Performed by: FAMILY MEDICINE

## 2022-11-08 PROCEDURE — 99214 OFFICE O/P EST MOD 30 MIN: CPT | Performed by: FAMILY MEDICINE

## 2022-11-08 PROCEDURE — 80061 LIPID PANEL: CPT | Performed by: FAMILY MEDICINE

## 2022-11-08 PROCEDURE — 82043 UR ALBUMIN QUANTITATIVE: CPT | Performed by: FAMILY MEDICINE

## 2022-11-08 PROCEDURE — 83721 ASSAY OF BLOOD LIPOPROTEIN: CPT | Mod: 59 | Performed by: FAMILY MEDICINE

## 2022-11-08 PROCEDURE — 80053 COMPREHEN METABOLIC PANEL: CPT | Performed by: FAMILY MEDICINE

## 2022-11-08 PROCEDURE — 85025 COMPLETE CBC W/AUTO DIFF WBC: CPT | Mod: QW | Performed by: FAMILY MEDICINE

## 2022-11-08 PROCEDURE — 99207 PR FOOT EXAM NO CHARGE: CPT | Mod: 25 | Performed by: FAMILY MEDICINE

## 2022-11-08 RX ORDER — INSULIN GLARGINE 100 [IU]/ML
INJECTION, SOLUTION SUBCUTANEOUS
COMMUNITY
Start: 2021-06-02 | End: 2022-11-08

## 2022-11-08 RX ORDER — INSULIN LISPRO 100 [IU]/ML
INJECTION, SOLUTION INTRAVENOUS; SUBCUTANEOUS
Qty: 50 ML | Refills: 1 | Status: SHIPPED | OUTPATIENT
Start: 2022-11-08 | End: 2023-04-12

## 2022-11-08 RX ORDER — LOSARTAN POTASSIUM AND HYDROCHLOROTHIAZIDE 12.5; 5 MG/1; MG/1
1 TABLET ORAL DAILY
Qty: 90 TABLET | Refills: 3 | Status: SHIPPED | OUTPATIENT
Start: 2022-11-08 | End: 2023-04-12

## 2022-11-08 RX ORDER — INSULIN LISPRO 100 [IU]/ML
INJECTION, SOLUTION INTRAVENOUS; SUBCUTANEOUS
Qty: 30 ML | Refills: 1 | Status: SHIPPED | OUTPATIENT
Start: 2022-11-08 | End: 2023-04-12

## 2022-11-08 RX ORDER — INSULIN GLARGINE 100 [IU]/ML
INJECTION, SOLUTION SUBCUTANEOUS
Qty: 30 ML | Refills: 1 | Status: SHIPPED | OUTPATIENT
Start: 2022-11-08 | End: 2023-04-12

## 2022-11-08 NOTE — LETTER
November 8, 2022      Wolf Gibbs   [address] [place] [id number]        To Whom It May Concern:    Wolf Gibbs  was seen in clinic on 11/08/2022.          Sincerely,        Tavares Jarquin MD

## 2022-11-08 NOTE — LETTER
November 8, 2022      Wolf Gibbs   [address] [place] [id number]        Dear ,    We are writing to inform you of your test results.    Test results indicate you may require additional follow up, see comment below.  Your triglycerides extremely high please see me back in the next month to discuss treatment.  Your hemoglobin A1c is very high.  You are also starting to leak protein in your urine from your diabetes.  Please set up to follow-up with Kori Noble as we discussed.    Resulted Orders   Albumin Random Urine Quantitative with Creat Ratio   Result Value Ref Range    Albumin Urine mg/L 54.0 mg/L      Comment:      The reference ranges have not been established in urine albumin. The results should be integrated into the clinical context for interpretation.    Albumin Urine mg/g Cr 201.49 (H) 0.00 - 17.00 mg/g Cr      Comment:      Microalbuminuria is defined as an albumin:creatinine ratio of 17 to 299 for males and 25 to 299 for females. A ratio of albumin:creatinine of 300 or higher is indicative of overt proteinuria.  Due to biologic variability, positive results should be confirmed by a second, first-morning random or 24-hour timed urine specimen. If there is discrepancy, a third specimen is recommended. When 2 out of 3 results are in the microalbuminuria range, this is evidence for incipient nephropathy and warrants increased efforts at glucose control, blood pressure control, and institution of therapy with an angiotensin-converting-enzyme (ACE) inhibitor (if the patient can tolerate it).      Creatinine Urine mg/dL 26.8 mg/dL      Comment:      The reference ranges have not been established in urine creatinine. The results should be integrated into the clinical context for interpretation.   Comprehensive metabolic panel   Result Value Ref Range    Sodium 131 (L) 136 - 145 mmol/L    Potassium 4.2 3.4 - 5.3 mmol/L    Chloride 90 (L) 98 - 107 mmol/L    Carbon Dioxide (CO2) 23 22 - 29  mmol/L    Anion Gap 18 (H) 7 - 15 mmol/L    Urea Nitrogen 21.0 (H) 6.0 - 20.0 mg/dL    Creatinine 0.62 (L) 0.67 - 1.17 mg/dL    Calcium 9.2 8.6 - 10.0 mg/dL    Glucose 498 (H) 70 - 99 mg/dL    Alkaline Phosphatase 92 40 - 129 U/L    AST 14 10 - 50 U/L    ALT 19 10 - 50 U/L    Protein Total 6.9 6.4 - 8.3 g/dL    Albumin 4.4 3.5 - 5.2 g/dL    Bilirubin Total 1.4 (H) <=1.2 mg/dL    GFR Estimate >90 >60 mL/min/1.73m2      Comment:      Effective December 21, 2021 eGFRcr in adults is calculated using the 2021 CKD-EPI creatinine equation which includes age and gender (Cammy wolff al., [place], DOI: 10.1056/HKPMre6397221)   Hemoglobin A1c   Result Value Ref Range    Hemoglobin A1C 14.3 (H) 0.0 - 5.6 %   CBC with platelets and differential   Result Value Ref Range    WBC Count 7.2 4.0 - 11.0 10e3/uL    RBC Count 5.24 4.40 - 5.90 10e6/uL    Hemoglobin 15.8 13.3 - 17.7 g/dL    Hematocrit 43.4 40.0 - 53.0 %    MCV 83 78 - 100 fL    MCH 30.2 26.5 - 33.0 pg    MCHC 36.4 31.5 - 36.5 g/dL    RDW 12.1 10.0 - 15.0 %    Platelet Count 174 150 - 450 10e3/uL    % Neutrophils 62 %    % Lymphocytes 28 %    % Monocytes 8 %    % Eosinophils 2 %    % Basophils 1 %    % Immature Granulocytes 0 %    Absolute Neutrophils 4.4 1.6 - 8.3 10e3/uL    Absolute Lymphocytes 2.0 0.8 - 5.3 10e3/uL    Absolute Monocytes 0.6 0.0 - 1.3 10e3/uL    Absolute Eosinophils 0.1 0.0 - 0.7 10e3/uL    Absolute Basophils 0.1 0.0 - 0.2 10e3/uL    Absolute Immature Granulocytes 0.0 <=0.4 10e3/uL       If you have any questions or concerns, please call the clinic at the number listed above.       Sincerely,      Tavares Jarquin MD

## 2022-11-08 NOTE — PROGRESS NOTES
"  Assessment & Plan     Diabetes mellitus due to underlying condition without complication, with long-term current use of insulin (H)  Patient will see diabetic education.  He was started back on his insulin pump on his pump supplies.  His labs are pending.  Discussed diabetic eye exam.  - CBC with Platelets & Differential  - Albumin Random Urine Quantitative with Creat Ratio  - Comprehensive metabolic panel  - Lipid panel reflex to direct LDL Fasting  - Hemoglobin A1c  - insulin glargine (LANTUS SOLOSTAR) 100 UNIT/ML pen; See Instructions, Instructions: 20 Subcutaneous daily, # 30 mL, 3 Refill(s), Type: Maintenance, [id number], 20 Subcutaneous daily, 69, in, 03/04/21 14:16:00 CST, Height Measured, 231, lb, 03/04/21 14:16:00 CST, Weight Measured Strength: 100 UNIT/ML  - insulin lispro (HUMALOG KWIKPEN) 100 UNIT/ML (1 unit dial) KWIKPEN; Taking up to 30 units a day divided between meals and snacks.  - insulin lispro (HUMALOG VIAL) 100 UNIT/ML vial; Using 45 units via insulin pump daily.  - [facility] Adult Diabetes Educator Referral; Future  - metFORMIN (GLUCOPHAGE) 500 MG tablet; Take 1 tablet (500 mg) by mouth daily (with breakfast)    Benign essential hypertension  Will resume his losartan hydrochlorothiazide    Thiazide  - losartan-hydrochlorothiazide (HYZAAR) 50-12.5 MG tablet; Take 1 tablet by mouth daily    Pure hypertriglyceridemia  Taken while his sugars were high before he was on treatment for his diabetes.  We will repeat today.  We discussed statins with him also discussed cardiac calcium score.               BMI:   Estimated body mass index is 29.98 kg/m  as calculated from the following:    Height as of this encounter: 1.753 m (5' 9\").    Weight as of this encounter: 92.1 kg (203 lb).           Return in about 6 months (around 5/8/2023) for Follow up.    Tavares Jarquin MD  [facility]   Wolf is a 37 year old, presenting for the " "following health issues patient notes that he did not have insurance so he could not afford his insulin has been stretching it out.  He has 70 to 80% of his blood sugars are reasonable but has had a number that are high as well.  Has been very very careful with his diet.  He has been smoke-free now since beginning of the year.  No alcohol use.  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  He is in school.  He wants to set up counseling for troubled young man.  He has younger sister with Down syndrome.  Diabetes and Hypertension      History of Present Illness       Diabetes:   He presents for follow up of diabetes.  He is checking home blood glucose with a continuous glucose monitor.  He checks blood glucose before meals, after meals, before and after meals and at bedtime.  Blood glucose is sometimes over 200 and never under 70. He is aware of hypoglycemia symptoms including shakiness and weakness. He is concerned about blood sugar frequently over 200.  He is having excessive thirst. The patient has not had a diabetic eye exam in the last 12 months.         He eats 2-3 servings of fruits and vegetables daily.He consumes 2 sweetened beverage(s) daily.He exercises with enough effort to increase his heart rate 20 to 29 minutes per day.  He exercises with enough effort to increase his heart rate 5 days per week.   He is taking medications regularly.     Has not had insurance past 2 months and using insulin sparingly. Has not been using pump but plans to get back on it. Now has insurance.     Has not been taking Hyzaar or metformin d/t insurance.    Woke up this AM and feels dry in throat.     Declines flu and COVID booster.    Last eye exam done \"years\" ago.         Review of Systems   Constitutional, HEENT, cardiovascular, pulmonary, gi and gu systems are negative, except as otherwise noted.      Objective    BP (!) 128/100 (BP Location: Right arm, Patient Position: Sitting, Cuff Size: Adult Large) " "  Pulse 84   Temp 97.1  F (36.2  C) (Temporal)   Ht 1.753 m (5' 9\")   Wt 92.1 kg (203 lb)   BMI 29.98 kg/m    Body mass index is 29.98 kg/m .  Physical Exam   GENERAL: healthy, alert and no distress  NECK: no adenopathy, no asymmetry, masses, or scars and thyroid normal to palpation  RESP: lungs clear to auscultation - no rales, rhonchi or wheezes  CV: regular rate and rhythm, normal S1 S2, no S3 or S4, no murmur, click or rub, no peripheral edema and peripheral pulses strong  ABDOMEN: soft, nontender, no hepatosplenomegaly, no masses and bowel sounds normal  MS: no gross musculoskeletal defects noted, no edema  Diabetic foot exam: normal DP and PT pulses, no trophic changes or ulcerative lesions and normal sensory exam    Office Visit - [place] on 02/09/2021   Component Date Value Ref Range Status     Creatinine Urine mg/dL 02/09/2021 113  20 - 320 mg/dL Final    Comment: Lab test performed by:  Lab Mnemonic:Buru Buru  [address] [place] [address]  Alek Box M.D.  QUEST Specimen received date and time: 09-FEB-2021 16:02:00.00       Microalbumin Urine mg/g Cr 02/09/2021 19  <30 Final    Comment:    The ADA defines abnormalities in albumin  excretion as follows:     Category         Result (mcg/mg creatinine)     Normal                    <30  Microalbuminuria            Clinical albuminuria   > OR = 300     The ADA recommends that at least two of three  specimens collected within a 3-6 month period be  abnormal before considering a patient to be  within a diagnostic category.  Lab test performed by:  Lab Mnemonic:Buru Buru  [address] [place] [address]  Alek Box M.D.  Unit of Measure:   mcg/mg creat  QUEST Specimen received date and time: 09-FEB-2021 16:02:00.00       Microalbumin Urine mg/dL 02/09/2021 2.2  See Note: mg/dL Final    Comment: Reference Range:    Reference Range  Not established  Lab test performed " by:  Lab Mnemonic:  [employer] [place]  [address] [place] [address]  Alek Box M.D.  QUEST Specimen received date and time: 09-FEB-2021 16:02:00.00       MPV 02/09/2021 12.4  7.5 - 12.5 fL Final    Comment: Lab test performed by:  Lab Mnemonic:  [facility]  [facility] [place] [address]  Alek Box M.D.  QUEST Specimen received date and time: 09-FEB-2021 16:02:00.00       % Eosinophils 02/09/2021 3.7  % Final    Comment: Lab test performed by:  Lab Mnemonic:  JoggleBugRick [place]  [facility] [place] [address]  Alek Box M.D.  QUEST Specimen received date and time: 09-FEB-2021 16:02:00.00       MCH 02/09/2021 31.2  27.0 - 33.0 pg Final    Comment: Lab test performed by:  Lab Mnemonic:  [facility]  [facility] [place] [address]  Alek Box M.D.  QUEST Specimen received date and time: 09-FEB-2021 16:02:00.00       WBC Count 02/09/2021 8.2  3.8 - 10.8 Final    Comment: Lab test performed by:  Lab Mnemonic:  JoggleBugRick [place]  [facility] [place] [place] [address]  Alek Box M.D.  Unit of Measure:   Thousand/uL  QUEST Specimen received date and time: 09-FEB-2021 16:02:00.00       Hemoglobin 02/09/2021 15.7  13.2 - 17.1 gm/dL Final    Comment: Lab test performed by:  Lab Mnemonic:  [employer]-Rick [place]  [address] [place] [address]  Alek Box M.D.  QUEST Specimen received date and time: 09-FEB-2021 16:02:00.00       RBC Count 02/09/2021 5.04  4.20 - 5.80 Final    Comment: Lab test performed by:  Lab Mnemonic:MARTÍN  [employer] [facility]  [address] [place] [address]  Alek Box M.D.  Unit of Measure:   Million/uL  QUEST Specimen received date and time: 09-FEB-2021 16:02:00.00       Absolute Eosinophils 02/09/2021 303  15 - 611 Final    Comment: Lab test performed by:  Lab Mnemonic:[place]  [place]  [address] [place] [address]  Alek Box M.D.  Unit of Measure:   cells/uL  QUEST Specimen received date and time: 09-FEB-2021 16:02:00.00       Hematocrit 02/09/2021 44.5  38.5 - 50.0 % Final    Comment: Lab test performed by:  Lab Mnemonic:  Bean BentleyRick [place]  [facility] [place] [address]  Alek Box M.D.  QUEST Specimen received date and time: 09-FEB-2021 16:02:00.00       Absolute Lymphocytes 02/09/2021 2,296  850 - 3,900 Final    Comment: Lab test performed by:  Lab Mnemonic:  Bean Kincaid [facility] [place] [address]  Alek Box M.D.  Unit of Measure:   cells/uL  QUEST Specimen received date and time: 09-FEB-2021 16:02:00.00       Absolute Basophils 02/09/2021 49  0 - 200 Final    Comment: Lab test performed by:  Lab Mnemonic:  [employer] Lottie K[place] [place] [address]  Alek Box M.D.  Unit of Measure:   cells/uL  QUEST Specimen received date and time: 09-FEB-2021 16:02:00.00       % Lymphocytes 02/09/2021 28.0  % Final    Comment: Lab test performed by:  Lab Mnemonic:  [employer] Lottie [place]  [facility] [place] [address]  Alek Box M.D.  QUEST Specimen received date and time: 09-FEB-2021 16:02:00.00       RDW 02/09/2021 12.9  11.0 - 15.0 % Final    Comment: Lab test performed by:  Lab Mnemonic:MARTÍN  [employer] Lottie Bowen[address] [facility] [place] [address]  Alek Box M.D.  QUEST Specimen received date and time: 09-FEB-2021 16:02:00.00       % Basophils 02/09/2021 0.6  % Final    Comment: Lab test performed by:  Lab Mnemonic:MARTÍN [facility]  [address] [place] [address]  SHELLY Chase Specimen received date and time: 09-FEB-2021 16:02:00.00       Absolute Neutrophils 02/09/2021 5,223  1,500 - 7,800 Final    Comment: Lab test performed by:  Lab Mnemonic:MARTÍN [place]  Lottie [place]  [address] [place] [address]  Alek Box M.D.  Unit of Measure:   cells/uL  QUEST Specimen received date and time: 09-FEB-2021 16:02:00.00       MCV 02/09/2021 88.3  80.0 - 100.0 fL Final    Comment: Lab test performed by:  Lab Mnemonic:MARTÍN [place] [address]  Alek Box M.D.  QUEST Specimen received date and time: 09-FEB-2021 16:02:00.00       % Neutrophils 02/09/2021 63.7  % Final    Comment: Lab test performed by:  Lab Mnemonic:CB  [facility]  [address] [place] [address]  Alek Box M.D.  QUEST Specimen received date and time: 09-FEB-2021 16:02:00.00       MCHC 02/09/2021 35.3  32.0 - 36.0 gm/dL Final    Comment: Lab test performed by:  Lab Mnemonic:MARTÍN  [place] [address]  Alek Box M.D.  QUEST Specimen received date and time: 09-FEB-2021 16:02:00.00       Absolute Monocytes 02/09/2021 328  200 - 950 Final    Comment: Lab test performed by:  Lab Mnemonic:MARTÍN [place] [address]  Alek Box M.D.  Unit of Measure:   cells/uL  QUEST Specimen received date and time: 09-FEB-2021 16:02:00.00       % Monocytes 02/09/2021 4.0  % Final    Comment: Lab test performed by:  Lab Mnemonic:CB  [facility]  [address] [place] [address]  Alek Box M.D.  QUEST Specimen received date and time: 09-FEB-2021 16:02:00.00       Platelet Count 02/09/2021 137 (L)  140 - 400 Final    Comment: Lab test performed by:  Lab Mnemonic:MARTÍN  [employer] Lottie [place]  [address] [place] [address]  Alek Box M.D.  Unit of Measure:   Thousand/uL  QUEST Specimen received date and time: 09-FEB-2021 16:02:00.00       Ketones Urine 02/09/2021 TRACE (A)  NEGATIVE-NEGATIVE Final    Comment: Lab test performed by:  Lab Mnemonic:MARTÍN  [employer]  [address] [address]  Alek Box M.D.  QUEST Specimen received date and time: 09-FEB-2021 16:02:00.00       Nitrite Urine 02/09/2021 NEGATIVE  NEGATIVE-NEGATIVE Final    Comment: Lab test performed by:  Lab Mnemonic:  [place] Mc[address] [address]  Alek Box M.D.  QUEST Specimen received date and time: 09-FEB-2021 16:02:00.00       Leukocyte Esterase Urine 02/09/2021 NEGATIVE  NEGATIVE-NEGATIVE Final    Comment:            Your request to have a duplicate copy faxed has been acknowledged.                              Queued to:  [id number]   Lab test performed by:  Lab Mnemonic:MARTÍN  [place] [facility] [address]  Alek Box M.D.  QUEST Specimen received date and time: 09-FEB-2021 16:02:00.00       Protein Albumin Urine 02/09/2021 NEGATIVE  NEGATIVE-NEGATIVE Final    Comment: Lab test performed by:  Lab Mnemonic:MARTÍN  [employer] [address]  Alek Box M.D.  QUEST Specimen received date and time: 09-FEB-2021 16:02:00.00       Specific Gravity Urine 02/09/2021 1.043 (H)  1.001 - 1.035 Final    Comment: Lab test performed by:  Lab Mnemonic:MARTÍN  [facility] [address]Rose Box M.D.  QUEST Specimen received date and time: 09-FEB-2021 16:02:00.00       Glucose Urine 02/09/2021 3+ (A)  NEGATIVE-NEGATIVE Final    Comment: Lab test performed by:  Lab Mnemonic:MARTÍN  [facility] [address]  Alek Box M.D.  QUEST Specimen received date and time: 09-FEB-2021 16:02:00.00       Color Urine 02/09/2021 YELLOW  YELLOW Final    Comment: Lab test performed by:  Lab Mnemonic:MARTÍN  [facility]  [address] [place] [address]  Alek Box M.D.  QUEST Specimen received date and time:  09-FEB-2021 16:02:00.00       Blood Urine 02/09/2021 NEGATIVE  NEGATIVE-NEGATIVE Final    Comment: Lab test performed by:  Lab Mnemonic:  Bean Kincaid [facility] [address]Renee Box M.D.  QUEST Specimen received date and time: 09-FEB-2021 16:02:00.00       Bilirubin Urine 02/09/2021 NEGATIVE  NEGATIVE-NEGATIVE Final    Comment: Lab test performed by:  Lab Mnemonic:  [facility]  [facility] [place] [address]  Alek Box M.D.  QUEST Specimen received date and time: 09-FEB-2021 16:02:00.00       pH Urine 02/09/2021 5.5  5.0 - 8.0 Final    Comment: Lab test performed by:  Lab Mnemonic:  Bean [facility]  [facility] [address]Renee Box M.D.  QUEST Specimen received date and time: 09-FEB-2021 16:02:00.00       Appearance Urine 02/09/2021 CLEAR  CLEAR Final    Comment: Lab test performed by:  Lab Mnemonic:  [employer] McRick [place]  [facility] [place] [address]Renee Box M.D.  QUEST Specimen received date and time: 09-FEB-2021 16:02:00.00       C Peptide 02/09/2021 1.28  0.80 - 3.85 ng/mL Final    Comment: Lab test performed by:  Lab Mnemonic:  [employer] [facility]  [facility] [place] [address]Renee Box M.D.  QUEST Specimen received date and time: 09-FEB-2021 16:02:00.00       TSH 02/09/2021 0.82  0.40 - 4.50 mIU/L Final    Comment: Lab test performed by:  Lab Mnemonic:MARTÍN  [employer] [place]  [facility] [place] [address]Renee Box M.D.  QUEST Specimen received date and time: 09-FEB-2021 16:02:00.00       Glucose 02/09/2021 378 (H)  65 - 99 mg/dL Final    Comment:               Fasting reference interval     For someone without known diabetes, a glucose  value >125 mg/dL indicates that they may have  diabetes and this should be confirmed with a  follow-up test.     Lab test performed by:  Lab  Mnemonic:  [employer] Lottie [place]  [address] [facility] [place] [address]  Alek Box M.D.  QUEST Specimen received date and time: 09-FEB-2021 16:02:00.00       AST 02/09/2021 12  10 - 40 unit/L Final    Comment: Lab test performed by:  Lab Mnemonic:  Bean [place]  [facility] [place] [address]  Alek Box M.D.  Unit of Measure:   U/L  QUEST Specimen received date and time: 09-FEB-2021 16:02:00.00       Globulin 02/09/2021 2.2  1.9 - 3.7 Final    Comment: Lab test performed by:  Lab Mnemonic:  [employer] Lottie [place]  [facility] [facility] [place] [address]  Alek Box M.D.  Unit of Measure:   g/dL (calc)  QUEST Specimen received date and time: 09-FEB-2021 16:02:00.00       Alkaline Phosphatase 02/09/2021 97  36 - 130 unit/L Final    Comment: Lab test performed by:  Lab Mnemonic:  [employer]Zakiya [place]  [facility] [place] [address]  Alek Box M.D.  Unit of Measure:   U/L  QUEST Specimen received date and time: 09-FEB-2021 16:02:00.00       Albumin 02/09/2021 4.1  3.6 - 5.1 gm/dL Final    Comment: Lab test performed by:  Lab Mnemonic:  [facility] PathZakiya [place]  [facility] [place] [address]  Alek Box M.D.  QUEST Specimen received date and time: 09-FEB-2021 16:02:00.00       Urea Nitrogen 02/09/2021 7  7 - 25 mg/dL Final    Comment: Lab test performed by:  Lab Mnemonic:  [facility] PathZakiya [place]  [facility] [place] [address]  Alek Box M.D.  QUEST Specimen received date and time: 09-FEB-2021 16:02:00.00       ALT 02/09/2021 15  9 - 46 unit/L Final    Comment: Lab test performed by:  Lab Mnemonic:MARTÍN  [facility]  [address] [place] [address]  Alek Box M.D.  Unit of Measure:   U/L  QUEST Specimen received date and time: 09-FEB-2021 16:02:00.00       A/G Ratio - Historical 02/09/2021 1.9  1.0 - 2.5 Final    Comment: Lab test  performed by:  Lab Mnemonic:  [facility]  [address] [facility] [place] [address]  Alek Box M.D.  Unit of Measure:   (calc)  QUEST Specimen received date and time: 09-FEB-2021 16:02:00.00       Potassium 02/09/2021 4.4  3.5 - 5.3 mmol/L Final    Comment: Lab test performed by:  Lab Mnemonic:  [employer]logixRick [place]  [facility] [place] [address]Rose Box M.D.  QUEST Specimen received date and time: 09-FEB-2021 16:02:00.00       BUN/Creatinine Ratio - Historical 02/09/2021 NOT APPLICABLE  6 - 22 Final    Comment: Lab test performed by:  Lab Mnemonic:  [employer]logixRick [place]  [facility] [facility] [place] [address]Renee Box M.D.  Unit of Measure:   (calc)  QUEST Specimen received date and time: 09-FEB-2021 16:02:00.00       Carbon Dioxide (CO2) 02/09/2021 23  20 - 32 mmol/L Final    Comment: Lab test performed by:  Lab Mnemonic:  [facility]  [facility] [place] [address]Renee Box M.D.  QUEST Specimen received date and time: 09-FEB-2021 16:02:00.00       Sodium 02/09/2021 135  135 - 146 mmol/L Final    Comment: Lab test performed by:  Lab Mnemonic:  [facility]  [facility] [place] [address]  Alek Box M.D.  QUEST Specimen received date and time: 09-FEB-2021 16:02:00.00       Bilirubin Total 02/09/2021 1.2  0.2 - 1.2 mg/dL Final    Comment: Lab test performed by:  Lab Mnemonic:  [facility]  [address] [facility] [place] [address]  Alek Box M.D.  QUEST Specimen received date and time: 09-FEB-2021 16:02:00.00       GFR Estimate 02/09/2021 119  > OR = 60 mL/min/1.73m2 Final    Comment: Lab test performed by:  Lab Mnemonic:MARTÍN  [employer] [facility]  [address] [place] [address]  Alek Box M.D.  QUEST Specimen received date and time: 09-FEB-2021 16:02:00.00       Chloride 02/09/2021 101  98 - 110 mmol/L  Final    Comment: Lab test performed by:  Lab Mnemonic:  [employer]Rick [place]  [address] [facility] [place] [address]  Alek Box M.D.  QUEST Specimen received date and time: 09-FEB-2021 16:02:00.00       Protein Total 02/09/2021 6.3  6.1 - 8.1 gm/dL Final    Comment: Lab test performed by:  Lab Mnemonic:  [employer] McRick [place]  [address] [facility] [place] [address]  Alek Box M.D.  QUEST Specimen received date and time: 09-FEB-2021 16:02:00.00       Creatinine 02/09/2021 0.76  0.60 - 1.35 mg/dL Final    Comment: Lab test performed by:  Lab Mnemonic:  [employer] McRick [place]  [address] [facility] [address] [place] [address]  Alek Box M.D.  QUEST Specimen received date and time: 09-FEB-2021 16:02:00.00       Calcium 02/09/2021 9.4  8.6 - 10.3 mg/dL Final    Comment: Lab test performed by:  Lab Mnemonic:  [employer] ManagementRick [place]  [facility] [facility] [place] [address]  Alek Box M.D.  QUEST Specimen received date and time: 09-FEB-2021 16:02:00.00

## 2022-11-16 ENCOUNTER — ALLIED HEALTH/NURSE VISIT (OUTPATIENT)
Dept: EDUCATION SERVICES | Facility: CLINIC | Age: 37
End: 2022-11-16
Payer: COMMERCIAL

## 2022-11-16 VITALS
DIASTOLIC BLOOD PRESSURE: 95 MMHG | SYSTOLIC BLOOD PRESSURE: 136 MMHG | BODY MASS INDEX: 32.33 KG/M2 | HEIGHT: 69 IN | WEIGHT: 218.3 LBS

## 2022-11-16 DIAGNOSIS — Z79.4 DIABETES MELLITUS DUE TO UNDERLYING CONDITION WITHOUT COMPLICATION, WITH LONG-TERM CURRENT USE OF INSULIN (H): Primary | ICD-10-CM

## 2022-11-16 DIAGNOSIS — E08.9 DIABETES MELLITUS DUE TO UNDERLYING CONDITION WITHOUT COMPLICATION, WITH LONG-TERM CURRENT USE OF INSULIN (H): Primary | ICD-10-CM

## 2022-11-16 PROCEDURE — G0108 DIAB MANAGE TRN  PER INDIV: HCPCS | Performed by: DIETITIAN, REGISTERED

## 2022-11-16 RX ORDER — PROCHLORPERAZINE 25 MG/1
SUPPOSITORY RECTAL
Qty: 3 EACH | Refills: 3 | Status: SHIPPED | OUTPATIENT
Start: 2022-11-16 | End: 2023-04-12

## 2022-11-16 RX ORDER — INSULIN INFUSION SET/CARTRIDGE
1 COMBINATION PACKAGE (EA) MISCELLANEOUS
Qty: 10 EACH | Refills: 3 | Status: SHIPPED | OUTPATIENT
Start: 2022-11-16 | End: 2023-04-12

## 2022-11-16 RX ORDER — PROCHLORPERAZINE 25 MG/1
SUPPOSITORY RECTAL
Qty: 1 EACH | Refills: 1 | Status: SHIPPED | OUTPATIENT
Start: 2022-11-16 | End: 2023-04-12

## 2022-11-16 RX ORDER — INSULIN PUMP CARTRIDGE
1 CARTRIDGE (EA) SUBCUTANEOUS
Qty: 10 EACH | Refills: 3 | Status: SHIPPED | OUTPATIENT
Start: 2022-11-16 | End: 2023-04-12

## 2022-11-16 NOTE — PATIENT INSTRUCTIONS
ICR     12:00 10 change to 9     Sensitivity = 50 change to 45     Target 120 keep      Basal: = 13.44u/ day     MN- 0.56  Change to 0.70    Try sugar free gum, sugar free mints for sweet taste   Savor anything sweet, small bites         Goals:  Practice healthy stress management and mindful eating - think are you physically hungry or are you bored, stressed, emotional etc, make of list of things to do besides eat.    Try to get good quality sleep with a goal of 7-8 hours per night.  Stay physically active daily.  Recommend working up to a total of 30 minutes on 5 days/ week.  Recommend a fitness tracker.     Eat in a healthy way- eliminate trans fats, limit saturated fats and added sugars; follow the plate method - picture above.  Keep a food record (MyFitnessPal, [place]).    A meal is 3 or more food groups; make it colorful for better nutrition.    Total Carbohydrates (in grams) = Breakfast  30    Lunch  30    Supper  30    If desired snacks 15

## 2022-11-16 NOTE — LETTER
11/16/2022         RE: Wolf Gibbs  [address]  [facility] [id number]        Dear Colleague,    Thank you for referring your patient, Wolf Gibbs, to the [facility]. Please see a copy of my visit note below.    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  Patient is here today to get back on track as patient has new good insurance coverage for diabetes supplies.    Patient is not currently wearing pump today states he ran out of insulin and needed to refill the pump so pump was left at home.  Dexcom G6 sensor was just inserted less than 24 hours of information on sensor.    Patient states he has been taking 22 units of of Lantus along with wearing the pump and not having any low glucose readings.  Significant pump education today again, patient is not to take Lantus when wearing insulin pump.    Patient to follow-up in 1 month wearing the insulin pump for downloading capability as patient does not have t:connect yet.      Patient's most recent   Lab Results   Component Value Date    A1C 14.3 11/08/2022     is not meeting goal of <7.0    Diabetes knowledge and skills assessment:   Patient is knowledgeable in diabetes management concepts related to: Healthy Eating, Being Active, Monitoring, Problem Solving, Reducing Risks, Healthy Coping and Insulin Pump Concepts Carbohydrate counting  Calculating boluses    Continue education with the following diabetes management concepts: Insulin Pump Concepts Problem solving with insulin pump therapy (BG monitoring; hypoglycemia signs/symptoms, treatment (glucagon) and prevention;  "hyperglycemia signs/symptoms, treatment and prevention; ketones, DKA signs/symptoms and prevention)  Adjusting pump settings    Based on learning assessment above, most appropriate setting for further diabetes education would be: Individual setting.      PLAN    ICR     12:00 10 change to 9     Sensitivity = 50 change to 45     Target 120 keep      Basal: = 13.44u/ day   MN- 0.56  Change to 0.70 (pt was taking 22u Lantus)    Topics to cover at upcoming visits: Healthy Eating, Being Active, Monitoring, Taking Medication, Problem Solving, Reducing Risks and Healthy Coping all areas as needed for ongoing diabetes management    Follow-up: 1 month    See Care Plan for co-developed, patient-state behavior change goals.  AVS provided for patient today.    Education Materials Provided:  Carbohydrate Counting and My Plate Planner      SUBJECTIVE/OBJECTIVE:  Diabetes education in the past 24mo: No  Diabetes type: Type 1  Disease course: Getting harder to manage  How confident are you filling out medical forms by yourself:: Extremely  Cultural Influences/Ethnic Background:  Not  or     Diabetes Symptoms & Complications:  Fatigue: No  Neuropathy: No  Polydipsia: Sometimes  Polyphagia: No  Polyuria: No  Visual change: No  Slow healing wounds: No  Autonomic neuropathy: No  CVA: No  Heart disease: No  Nephropathy: No  Peripheral neuropathy: No  Peripheral Vascular Disease: No  Retinopathy: No  Sexual dysfunction: No    Patient Problem List and Family Medical History reviewed for relevant medical history, current medical status, and diabetes risk factors.    Vitals:  BP (!) 136/95   Ht 1.753 m (5' 9\")   Wt 99 kg (218 lb 4.8 oz)   BMI 32.24 kg/m    Estimated body mass index is 32.24 kg/m  as calculated from the following:    Height as of this encounter: 1.753 m (5' 9\").    Weight as of this encounter: 99 kg (218 lb 4.8 oz).   Last 3 BP:   BP Readings from Last 3 Encounters:   11/16/22 (!) 136/95   11/08/22 (!) " 128/100   02/09/21 136/77       History   Smoking Status     Former     Packs/day: 1.00     Types: Cigarettes   Smokeless Tobacco     Never       Labs:  Lab Results   Component Value Date    A1C 14.3 11/08/2022     Lab Results   Component Value Date     11/08/2022     02/09/2021     Lab Results   Component Value Date    LDL  11/08/2022      Comment:      Cannot estimate LDL when triglyceride exceeds 400 mg/dL    LDL 55 11/08/2022     Direct Measure HDL   Date Value Ref Range Status   11/08/2022 23 (L) >=40 mg/dL Final   ]  GFR Estimate   Date Value Ref Range Status   11/08/2022 >90 >60 mL/min/1.73m2 Final     Comment:     Effective December 21, 2021 eGFRcr in adults is calculated using the 2021 CKD-EPI creatinine equation which includes age and gender (Cammy wolff al., NEJ, DOI: 10.1056/MXZDgr1746780)   09/22/2020 >60 >60 Final     Comment:     _  Unit of Measure:   ml/min/1.73m2       No results found for: GFRESTBLACK  Lab Results   Component Value Date    CR 0.62 11/08/2022     No results found for: MICROALBUMIN    Healthy Eating:  Cultural/Tenriism diet restrictions?: Yes  Meal planning/habits: Carb counting  How many times a week on average do you eat food made away from home (restaurant/take-out)?: 2  Meals include: Breakfast, Lunch, Dinner, Evening Snack  Beverages: Water, Milk, Energy drinks    Being Active:  Days per week of moderate to strenuous exercise (like a brisk walk): (P) 5  On average, minutes per day of exercise at this level: (P) 20  How intense was your typical exercise? : (P) Heavy (like jogging or swimming  Exercise Minutes per Week: (P) 100  Barrier to exercise: None    Monitoring:  Blood Glucose Meter: CGM  Times checking blood sugar at home (number): 5+  Times checking blood sugar at home (per): Day  Blood glucose trend: Fluctuating    Hemoglobin A1c 14.3% equals an estimated average glucose of 364 mg/dL  Per information and current care of the sensor showing 26% in range with  an average of 221 mg/dL, no hypoglycemia    Taking Medications:  Diabetes Medication(s)     Biguanides       metFORMIN (GLUCOPHAGE) 500 MG tablet    Take 1 tablet (500 mg) by mouth daily (with breakfast)    Insulin       insulin lispro (HUMALOG VIAL) 100 UNIT/ML vial    Using 45 units via insulin pump daily.     insulin glargine (LANTUS SOLOSTAR) 100 UNIT/ML pen    See Instructions, Instructions: 20 Subcutaneous daily, # 30 mL, 3 Refill(s), Type: Maintenance, [id number], 20 Subcutaneous daily, 69, in, 03/04/21 14:16:00 CST, Height Measured, 231, lb, 03/04/21 14:16:00 CST, Weight Measured Strength: 100 UNIT/ML     insulin lispro (HUMALOG KWIKPEN) 100 UNIT/ML (1 unit dial) KWIKPEN    Taking up to 30 units a day divided between meals and snacks.          Current Treatments: Insulin Pump  Dose schedule: (P) Pre-breakfast, Pre-lunch, Pre-dinner  Given by: (P) Patient  Injection/Infusion sites: (P) Abdomen    Problem Solving:                 Reducing Risks:  CAD Risks: Diabetes Mellitus, Hypertension    Healthy Coping:  Informal Support system:: [place] based, Family, Friends  Patient Activation Measure Survey Score:  No flowsheet data found.      Care Plan and Education Provided:  Care Plan: Diabetes   Updates made by Cielo Noble RD since 11/16/2022 12:00 AM      Problem: HbA1C Not In Goal       Goal: Establish Regular Follow-Ups with PCP       Task: Discuss with PCP the recommended timing for patient's next follow up visit(s)    Responsible User: Cielo Noble RD      Task: Discuss schedule for PCP visits with patient Completed 11/16/2022   Responsible User: Cielo Noble RD      Goal: Get HbA1C Level in Goal       Task: Educate patient on diabetes education self-management topics    Responsible User: Cielo Noble RD      Task: Educate patient on benefits of regular glucose monitoring    Responsible User: Cielo Noble RD      Task: Refer patient to appropriate extended care team  member, as needed (Medication Therapy Management, Behavioral Health, Physical Therapy, etc.)    Responsible User: Cielo Noble RD      Task: Discuss diabetes treatment plan with patient Completed 11/16/2022   Responsible User: Cielo Noble RD      Problem: Diabetes Self-Management Education Needed to Optimize Self-Care Behaviors       Goal: Understand diabetes pathophysiology and disease progression       Task: Provide education on diabetes pathophysiology and disease progression specfic to patient's diabetes type    Responsible User: Cielo Noble RD      Goal: Healthy Eating - follow a healthy eating pattern for diabetes       Task: Provide education on portion control and consistency in amount, composition and timing of food intake    Responsible User: Cielo Noble RD      Task: Provide education on managing carbohydrate intake (carbohydrate counting, plate planning method, etc.) Completed 11/16/2022   Responsible User: Cielo Noble RD      Task: Provide education on weight management    Responsible User: Cielo Noble RD      Task: Provide education on heart healthy eating Completed 11/16/2022   Responsible User: Cielo Noble RD      Task: Provide education on eating out Completed 11/16/2022   Responsible User: Cielo Noble RD      Task: Develop individualized healthy eating plan with patient    Responsible User: Cielo Noble RD      Goal: Being Active - get regular physical activity, working up to at least 150 minutes per week       Task: Provide education on relationship of activity to glucose and precautions to take if at risk for low glucose Completed 11/16/2022   Responsible User: Cielo Noble RD      Task: Discuss barriers to physical activity with patient    Responsible User: Cielo Noble RD      Task: Develop physical activity plan with patient    Responsible User: Cielo Noble RD      Task: Explore community resources including walking groups, assistance programs, and  home videos    Responsible User: Cielo Noble RD      Goal: Monitoring - monitor glucose and ketones as directed    This Visit's Progress: 30%   Note:    Patient to use Dexcom G6 as primary glucose readings and BG testing as a backup     Task: Provide education on blood glucose monitoring (purpose, proper technique, frequency, glucose targets, interpreting results, when to use glucose control solution, sharps disposal)    Responsible User: Cielo Noble RD      Task: Provide education on continuous glucose monitoring (sensor placement, use of minoo or /reader, understanding glucose trends, alerts and alarms, differences between sensor glucose and blood glucose) Completed 11/16/2022   Responsible User: Cielo Noble RD      Task: Provide education on ketone monitoring (when to monitor, frequency, etc.)    Responsible User: Cielo Noble RD      Goal: Taking Medication - patient is consistently taking medications as directed       Task: Provide education on action of prescribed medication, including when to take and possible side effects    Responsible User: Cielo Noble RD      Task: Provide education on insulin and injectable diabetes medications, including administration, storage, site selection and rotation for injection sites Completed 11/16/2022   Responsible User: Cielo Noble RD      Task: Discuss barriers to medication adherence with patient and provide management technique ideas as appropriate    Responsible User: Cielo Noble RD      Task: Provide education on frequency and refill details of medications    Responsible User: Cielo Noble RD      Goal: Problem Solving - know how to prevent and manage short-term diabetes complications       Task: Provide education on high blood glucose - causes, signs/symptoms, prevention and treatment Completed 11/16/2022   Responsible User: Cielo Noble RD      Task: Provide education on low blood glucose - causes, signs/symptoms, prevention,  treatment, carrying a carbohydrate source at all times, and medical identification Completed 11/16/2022   Responsible User: Cielo Noble RD      Task: Provide education on safe travel with diabetes    Responsible User: Cielo Noble RD      Task: Provide education on how to care for diabetes on sick days    Responsible User: Cielo Noble RD      Task: Provide education on when to call a health care provider    Responsible User: Cielo Noble RD      Goal: Reducing Risks - know how to prevent and treat long-term diabetes complications       Task: Provide education on major complications of diabetes, prevention, early diagnostic measures and treatment of complications    Responsible User: Cielo Noble RD      Task: Provide education on recommended care for dental, eye and foot health    Responsible User: Cielo Noble RD      Task: Provide education on Hemoglobin A1c - goals and relationship to blood glucose levels Completed 11/16/2022   Responsible User: Cielo Noble RD      Task: Provide education on recommendations for heart health - lipid levels and goals, blood pressure and goals, and aspirin therapy, if indicated    Responsible User: Cielo Noble RD      Task: Provide education on tobacco cessation    Responsible User: Cielo Noble RD      Goal: Healthy Coping - use available resources to cope with the challenges of managing diabetes       Task: Discuss recognizing feelings about having diabetes    Responsible User: Cielo Noble RD      Task: Provide education on the benefits of making appropriate lifestyle changes    Responsible User: Cielo Noble RD      Task: Provide education on benefits of utilizing support systems Completed 11/16/2022   Responsible User: Cielo Noble RD      Task: Discuss methods for coping with stress    Responsible User: Cielo Noble RD      Task: Provide education on when to seek professional counseling    Responsible User: Cielo Noble RD           Time Spent: 60 minutes  Encounter Type: Individual    Any diabetes medication dose changes were made via the CDE Protocol per the patient's referring provider. A copy of this encounter was shared with the provider.

## 2022-11-16 NOTE — PROGRESS NOTES
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  Patient is here today to get back on track as patient has new good insurance coverage for diabetes supplies.    Patient is not currently wearing pump today states he ran out of insulin and needed to refill the pump so pump was left at home.  Dexcom G6 sensor was just inserted less than 24 hours of information on sensor.    Patient states he has been taking 22 units of of Lantus along with wearing the pump and not having any low glucose readings.  Significant pump education today again, patient is not to take Lantus when wearing insulin pump.    Patient to follow-up in 1 month wearing the insulin pump for downloading capability as patient does not have t:connect yet.      Patient's most recent   Lab Results   Component Value Date    A1C 14.3 11/08/2022     is not meeting goal of <7.0    Diabetes knowledge and skills assessment:   Patient is knowledgeable in diabetes management concepts related to: Healthy Eating, Being Active, Monitoring, Problem Solving, Reducing Risks, Healthy Coping and Insulin Pump Concepts Carbohydrate counting  Calculating boluses    Continue education with the following diabetes management concepts: Insulin Pump Concepts Problem solving with insulin pump therapy (BG monitoring; hypoglycemia signs/symptoms, treatment (glucagon) and prevention; hyperglycemia signs/symptoms, treatment and prevention; ketones, DKA signs/symptoms and prevention)  Adjusting pump settings    Based on learning assessment above, most appropriate setting for further diabetes education would be: Individual setting.      PLAN    ICR    "  12:00 10 change to 9     Sensitivity = 50 change to 45     Target 120 keep      Basal: = 13.44u/ day   [place]- 0.56  Change to 0.70 (pt was taking 22u Lantus)    Topics to cover at upcoming visits: Healthy Eating, Being Active, Monitoring, Taking Medication, Problem Solving, Reducing Risks and Healthy Coping all areas as needed for ongoing diabetes management    Follow-up: 1 month    See Care Plan for co-developed, patient-state behavior change goals.  AVS provided for patient today.    Education Materials Provided:  Carbohydrate Counting and My Plate Planner      SUBJECTIVE/OBJECTIVE:  Diabetes education in the past 24mo: No  Diabetes type: Type 1  Disease course: Getting harder to manage  How confident are you filling out medical forms by yourself:: Extremely  Cultural Influences/Ethnic Background:  Not  or     Diabetes Symptoms & Complications:  Fatigue: No  Neuropathy: No  Polydipsia: Sometimes  Polyphagia: No  Polyuria: No  Visual change: No  Slow healing wounds: No  Autonomic neuropathy: No  CVA: No  Heart disease: No  Nephropathy: No  Peripheral neuropathy: No  Peripheral Vascular Disease: No  Retinopathy: No  Sexual dysfunction: No    Patient Problem List and Family Medical History reviewed for relevant medical history, current medical status, and diabetes risk factors.    Vitals:  BP (!) 136/95   Ht 1.753 m (5' 9\")   Wt 99 kg (218 lb 4.8 oz)   BMI 32.24 kg/m    Estimated body mass index is 32.24 kg/m  as calculated from the following:    Height as of this encounter: 1.753 m (5' 9\").    Weight as of this encounter: 99 kg (218 lb 4.8 oz).   Last 3 BP:   BP Readings from Last 3 Encounters:   11/16/22 (!) 136/95   11/08/22 (!) 128/100   02/09/21 136/77       History   Smoking Status     Former     Packs/day: 1.00     Types: Cigarettes   Smokeless Tobacco     Never       Labs:  Lab Results   Component Value Date    A1C 14.3 11/08/2022     Lab Results   Component Value Date     11/08/2022    "  02/09/2021     Lab Results   Component Value Date    LDL  11/08/2022      Comment:      Cannot estimate LDL when triglyceride exceeds 400 mg/dL    LDL 55 11/08/2022     Direct Measure HDL   Date Value Ref Range Status   11/08/2022 23 (L) >=40 mg/dL Final   ]  GFR Estimate   Date Value Ref Range Status   11/08/2022 >90 >60 mL/min/1.73m2 Final     Comment:     Effective December 21, 2021 eGFRcr in adults is calculated using the 2021 CKD-EPI creatinine equation which includes age and gender (Cammy et al., NEJ, DOI: 10.1056/TOSNhc1243768)   09/22/2020 >60 >60 Final     Comment:     _  Unit of Measure:   ml/min/1.73m2       No results found for: GFRESTBLACK  Lab Results   Component Value Date    CR 0.62 11/08/2022     No results found for: MICROALBUMIN    Healthy Eating:  Cultural/Faith diet restrictions?: Yes  Meal planning/habits: Carb counting  How many times a week on average do you eat food made away from home (restaurant/take-out)?: 2  Meals include: Breakfast, Lunch, Dinner, Evening Snack  Beverages: Water, Milk, Energy drinks    Being Active:  Days per week of moderate to strenuous exercise (like a brisk walk): (P) 5  On average, minutes per day of exercise at this level: (P) 20  How intense was your typical exercise? : (P) Heavy (like jogging or swimming  Exercise Minutes per Week: (P) 100  Barrier to exercise: None    Monitoring:  Blood Glucose Meter: CGM  Times checking blood sugar at home (number): 5+  Times checking blood sugar at home (per): Day  Blood glucose trend: Fluctuating    Hemoglobin A1c 14.3% equals an estimated average glucose of 364 mg/dL  Per information and current care of the sensor showing 26% in range with an average of 221 mg/dL, no hypoglycemia    Taking Medications:  Diabetes Medication(s)     Biguanides       metFORMIN (GLUCOPHAGE) 500 MG tablet    Take 1 tablet (500 mg) by mouth daily (with breakfast)    Insulin       insulin lispro (HUMALOG VIAL) 100 UNIT/ML vial    Using  45 units via insulin pump daily.     insulin glargine (LANTUS SOLOSTAR) 100 UNIT/ML pen    See Instructions, Instructions: 20 Subcutaneous daily, # 30 mL, 3 Refill(s), Type: Maintenance, [id number], 20 Subcutaneous daily, 69, in, 03/04/21 14:16:00 CST, Height Measured, 231, lb, 03/04/21 14:16:00 CST, Weight Measured Strength: 100 UNIT/ML     insulin lispro (HUMALOG KWIKPEN) 100 UNIT/ML (1 unit dial) KWIKPEN    Taking up to 30 units a day divided between meals and snacks.          Current Treatments: Insulin Pump  Dose schedule: (P) Pre-breakfast, Pre-lunch, Pre-dinner  Given by: (P) Patient  Injection/Infusion sites: (P) Abdomen    Problem Solving:                 Reducing Risks:  CAD Risks: Diabetes Mellitus, Hypertension    Healthy Coping:  Informal Support system:: [place] based, Family, Friends  Patient Activation Measure Survey Score:  No flowsheet data found.      Care Plan and Education Provided:  Care Plan: Diabetes   Updates made by Cielo Noble RD since 11/16/2022 12:00 AM      Problem: HbA1C Not In Goal       Goal: Establish Regular Follow-Ups with PCP       Task: Discuss with PCP the recommended timing for patient's next follow up visit(s)    Responsible User: Cielo Noble RD      Task: Discuss schedule for PCP visits with patient Completed 11/16/2022   Responsible User: Cielo Noble RD      Goal: Get HbA1C Level in Goal       Task: Educate patient on diabetes education self-management topics    Responsible User: Cielo Noble RD      Task: Educate patient on benefits of regular glucose monitoring    Responsible User: Cielo Noble RD      Task: Refer patient to appropriate extended care team member, as needed (Medication Therapy Management, Behavioral Health, Physical Therapy, etc.)    Responsible User: Cielo Noble RD      Task: Discuss diabetes treatment plan with patient Completed 11/16/2022   Responsible User: Cielo Noble RD      Problem: Diabetes  Self-Management Education Needed to Optimize Self-Care Behaviors       Goal: Understand diabetes pathophysiology and disease progression       Task: Provide education on diabetes pathophysiology and disease progression specfic to patient's diabetes type    Responsible User: Cielo Noble RD      Goal: Healthy Eating - follow a healthy eating pattern for diabetes       Task: Provide education on portion control and consistency in amount, composition and timing of food intake    Responsible User: Cielo Noble RD      Task: Provide education on managing carbohydrate intake (carbohydrate counting, plate planning method, etc.) Completed 11/16/2022   Responsible User: Cielo Noble RD      Task: Provide education on weight management    Responsible User: Cielo Noble RD      Task: Provide education on heart healthy eating Completed 11/16/2022   Responsible User: Cielo Noble RD      Task: Provide education on eating out Completed 11/16/2022   Responsible User: Cielo Noble RD      Task: Develop individualized healthy eating plan with patient    Responsible User: Cielo Noble RD      Goal: Being Active - get regular physical activity, working up to at least 150 minutes per week       Task: Provide education on relationship of activity to glucose and precautions to take if at risk for low glucose Completed 11/16/2022   Responsible User: Cielo Noble RD      Task: Discuss barriers to physical activity with patient    Responsible User: Cielo Noble RD      Task: Develop physical activity plan with patient    Responsible User: Cielo Noble RD      Task: Explore community resources including walking groups, assistance programs, and home videos    Responsible User: Cielo Noble RD      Goal: Monitoring - monitor glucose and ketones as directed    This Visit's Progress: 30%   Note:    Patient to use Dexcom G6 as primary glucose readings and BG testing as a backup     Task: Provide education on blood  glucose monitoring (purpose, proper technique, frequency, glucose targets, interpreting results, when to use glucose control solution, sharps disposal)    Responsible User: Cielo Noble RD      Task: Provide education on continuous glucose monitoring (sensor placement, use of minoo or /reader, understanding glucose trends, alerts and alarms, differences between sensor glucose and blood glucose) Completed 11/16/2022   Responsible User: Cielo Noble RD      Task: Provide education on ketone monitoring (when to monitor, frequency, etc.)    Responsible User: Cielo Noble RD      Goal: Taking Medication - patient is consistently taking medications as directed       Task: Provide education on action of prescribed medication, including when to take and possible side effects    Responsible User: Cielo Noble RD      Task: Provide education on insulin and injectable diabetes medications, including administration, storage, site selection and rotation for injection sites Completed 11/16/2022   Responsible User: Cielo Noble RD      Task: Discuss barriers to medication adherence with patient and provide management technique ideas as appropriate    Responsible User: Cielo Noble RD      Task: Provide education on frequency and refill details of medications    Responsible User: Cielo Noble RD      Goal: Problem Solving - know how to prevent and manage short-term diabetes complications       Task: Provide education on high blood glucose - causes, signs/symptoms, prevention and treatment Completed 11/16/2022   Responsible User: Cielo Noble RD      Task: Provide education on low blood glucose - causes, signs/symptoms, prevention, treatment, carrying a carbohydrate source at all times, and medical identification Completed 11/16/2022   Responsible User: Cielo Noble RD      Task: Provide education on safe travel with diabetes    Responsible User: Cielo Noble RD      Task: Provide education on how  to care for diabetes on sick days    Responsible User: Cielo Noble RD      Task: Provide education on when to call a health care provider    Responsible User: Cielo Noble RD      Goal: Reducing Risks - know how to prevent and treat long-term diabetes complications       Task: Provide education on major complications of diabetes, prevention, early diagnostic measures and treatment of complications    Responsible User: Cielo Noble RD      Task: Provide education on recommended care for dental, eye and foot health    Responsible User: Cielo Noble RD      Task: Provide education on Hemoglobin A1c - goals and relationship to blood glucose levels Completed 11/16/2022   Responsible User: Cielo Noble RD      Task: Provide education on recommendations for heart health - lipid levels and goals, blood pressure and goals, and aspirin therapy, if indicated    Responsible User: Cielo Noble RD      Task: Provide education on tobacco cessation    Responsible User: Cielo Noble RD      Goal: Healthy Coping - use available resources to cope with the challenges of managing diabetes       Task: Discuss recognizing feelings about having diabetes    Responsible User: Cielo Noble RD      Task: Provide education on the benefits of making appropriate lifestyle changes    Responsible User: Cielo Noble RD      Task: Provide education on benefits of utilizing support systems Completed 11/16/2022   Responsible User: Cielo Noble RD      Task: Discuss methods for coping with stress    Responsible User: Cielo Noble RD      Task: Provide education on when to seek professional counseling    Responsible User: Cielo Noble RD          Time Spent: 60 minutes  Encounter Type: Individual    Any diabetes medication dose changes were made via the CDE Protocol per the patient's referring provider. A copy of this encounter was shared with the provider.

## 2022-11-28 ENCOUNTER — TELEPHONE (OUTPATIENT)
Dept: FAMILY MEDICINE | Facility: CLINIC | Age: 37
End: 2022-11-28

## 2022-11-28 NOTE — TELEPHONE ENCOUNTER
Prior Authorization Not Needed per Insurance    Medication: Continuous Blood Gluc Sensor (DEXCOM G6 SENSOR) MISC--NO PA NEEDED  Insurance Company: Other (see comments)  Expected CoPay:      [facility] - [place], [place] - [address]   Pharmacy Notified: Yes  Patient Notified: Yes **Instructed pharmacy to notify patient when script is ready to /ship.**    Per rep under 1000 for a 30 day supply of 3 sensors.

## 2022-11-28 NOTE — TELEPHONE ENCOUNTER
MEDICAL    PRIOR AUTHORIZATION REQUIRED - See Reason for Call Comments for specific products. Billing with (A) codes.  CGM: Dexcom  A/K codes:     Please reach out to Micaela at [phone] to initiate the prior auth. Prior auth is only required on a quantity of 9 sensors due to cost being over $1,000.     INSURANCE: [employer] [place] [place]  ID: [id number]        [facility] TEAM  PHONE: [phone]  FAX: [phone]    Route determinations back to Played Diabetes pool ([id number])

## 2022-12-14 ENCOUNTER — ALLIED HEALTH/NURSE VISIT (OUTPATIENT)
Dept: EDUCATION SERVICES | Facility: CLINIC | Age: 37
End: 2022-12-14
Payer: COMMERCIAL

## 2022-12-14 VITALS — BODY MASS INDEX: 33.36 KG/M2 | WEIGHT: 225.2 LBS | HEIGHT: 69 IN

## 2022-12-14 DIAGNOSIS — Z79.4 DIABETES MELLITUS DUE TO UNDERLYING CONDITION WITHOUT COMPLICATION, WITH LONG-TERM CURRENT USE OF INSULIN (H): Primary | ICD-10-CM

## 2022-12-14 DIAGNOSIS — E08.9 DIABETES MELLITUS DUE TO UNDERLYING CONDITION WITHOUT COMPLICATION, WITH LONG-TERM CURRENT USE OF INSULIN (H): Primary | ICD-10-CM

## 2022-12-14 PROCEDURE — G0108 DIAB MANAGE TRN  PER INDIV: HCPCS | Performed by: DIETITIAN, REGISTERED

## 2022-12-14 NOTE — LETTER
12/14/2022         RE: Wolf Gibbs  [address]  [facility] [address]        Dear Colleague,    Thank you for referring your patient, Wolf Gibbs, to the [facility]. Please see a copy of my visit note below.    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  Glycemic control is still significantly elevated.    REPORTS:                  Insulin Pump Information    Education specific to insulin pump provided today on:   importance of changing infusion set every 3 days, importance of bolusing before meals, importance of counting carbohydrates accurately, sick day management and weight management    Opportunities for ongoing education and support in diabetes-self management were discussed.    Pt verbalized understanding of concepts discussed and recommendations provided today.       Continue education with the following diabetes management concepts: Insulin Pump Concepts Hands on practice with basic pump button use  Insulin pump setting adjustments    Pump Education Materials: Settings    ASSESSMENT  Patient is wearing pump and sensor appropriately but current insulin settings not adequate to obtain glycemic control    Insulin pump setting changes  Basal 0.75 units/h increased to 1.0 units/h  Correction factor 50 changed to 25  Insulin to carb ratio 9 changed to 6.5  PLAN    Insulin pump setting changes patient to follow-up for ongoing pump changes   Follow-up: 1 mo     See Care Plan for co-developed, patient-state behavior change goals.  AVS provided for patient today.    Education Materials Provided:  No new materials provided today      SUBJECTIVE/OBJECTIVE:     Cultural  "Influences/Ethnic Background:  Not  or     Diabetes Symptoms & Complications:          Patient Problem List and Family Medical History reviewed for relevant medical history, current medical status, and diabetes risk factors.    Vitals:  Ht 1.753 m (5' 9\")   Wt 102.2 kg (225 lb 3.2 oz)   BMI 33.26 kg/m    Estimated body mass index is 33.26 kg/m  as calculated from the following:    Height as of this encounter: 1.753 m (5' 9\").    Weight as of this encounter: 102.2 kg (225 lb 3.2 oz).   Last 3 BP:   BP Readings from Last 3 Encounters:   11/16/22 (!) 136/95   11/08/22 (!) 128/100   02/09/21 136/77       History   Smoking Status     Former     Packs/day: 1.00     Types: Cigarettes   Smokeless Tobacco     Never       Labs:  Lab Results   Component Value Date    A1C 14.3 11/08/2022     Lab Results   Component Value Date     11/08/2022     02/09/2021     Lab Results   Component Value Date    LDL  11/08/2022      Comment:      Cannot estimate LDL when triglyceride exceeds 400 mg/dL    LDL 55 11/08/2022     Direct Measure HDL   Date Value Ref Range Status   11/08/2022 23 (L) >=40 mg/dL Final   ]  GFR Estimate   Date Value Ref Range Status   11/08/2022 >90 >60 mL/min/1.73m2 Final     Comment:     Effective December 21, 2021 eGFRcr in adults is calculated using the 2021 CKD-EPI creatinine equation which includes age and gender (Cammy wolff al., NEJ, DOI: 10.1056/IXSMfj8587904)   09/22/2020 >60 >60 Final     Comment:     _  Unit of Measure:   ml/min/1.73m2       No results found for: GFRESTBLACK  Lab Results   Component Value Date    CR 0.62 11/08/2022     No results found for: MICROALBUMIN    Healthy Eating:       Being Active:       Monitoring:  Using Dexcom G6, BG testing as a backup    Taking Medications:  Diabetes Medication(s)     Biguanides       metFORMIN (GLUCOPHAGE) 500 MG tablet    Take 1 tablet (500 mg) by mouth daily (with breakfast)    Insulin       insulin lispro (HUMALOG KWIKPEN) 100 " UNIT/ML (1 unit dial) KWIKPEN    Taking up to 30 units a day divided between meals and snacks.     insulin glargine (LANTUS SOLOSTAR) 100 UNIT/ML pen    See Instructions, Instructions: 20 Subcutaneous daily, # 30 mL, 3 Refill(s), Type: Maintenance, Pharmacy: [facility] #[id number], 20 Subcutaneous daily, 69, in, 03/04/21 14:16:00 CST, Height Measured, 231, lb, 03/04/21 14:16:00 CST, Weight Measured Strength: 100 UNIT/ML     insulin lispro (HUMALOG VIAL) 100 UNIT/ML vial    Using 45 units via insulin pump daily.               Problem Solving:  Patient aware of signs and symptoms of hypoglycemia and treatment protocol              Reducing Risks:  Patient is working on improving diabetes control to help prevent complications related to uncontrolled diabetes.  Annual eye exam    Healthy Coping:     Patient Activation Measure Survey Score:  No flowsheet data found.      Care Plan and Education Provided:  Care Plan: Diabetes   Updates made by Cielo Noble RD since 12/14/2022 12:00 AM      Problem: HbA1C Not In Goal       Goal: Get HbA1C Level in Goal       Task: Educate patient on diabetes education self-management topics Completed 12/14/2022   Responsible User: Cielo Noble RD      Problem: Diabetes Self-Management Education Needed to Optimize Self-Care Behaviors       Goal: Healthy Eating - follow a healthy eating pattern for diabetes       Task: Provide education on weight management Completed 12/14/2022   Responsible User: Cielo Noble RD      Goal: Monitoring - monitor glucose and ketones as directed    This Visit's Progress: 100%   Recent Progress: 30%   Note:    Patient to use Dexcom G6 as primary glucose readings and BG testing as a backup     Goal: Taking Medication - patient is consistently taking medications as directed       Task: Provide education on action of prescribed medication, including when to take and possible side effects Completed 12/14/2022   Responsible User: Cielo Noble RD       Task: Discuss barriers to medication adherence with patient and provide management technique ideas as appropriate Completed 12/14/2022   Responsible User: Cielo Noble RD      Goal: Problem Solving - know how to prevent and manage short-term diabetes complications       Task: Provide education on how to care for diabetes on sick days Completed 12/14/2022   Responsible User: Cielo Noble RD      Goal: Reducing Risks - know how to prevent and treat long-term diabetes complications       Task: Provide education on recommended care for dental, eye and foot health Completed 12/14/2022   Responsible User: Cielo Noble RD          Time Spent: 30 minutes  Encounter Type: Individual    Any diabetes medication dose changes were made via the CDE Protocol per the patient's referring provider. A copy of this encounter was shared with the provider.

## 2022-12-14 NOTE — PROGRESS NOTES
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  Glycemic control is still significantly elevated.    REPORTS:                  Insulin Pump Information    Education specific to insulin pump provided today on:   importance of changing infusion set every 3 days, importance of bolusing before meals, importance of counting carbohydrates accurately, sick day management and weight management    Opportunities for ongoing education and support in diabetes-self management were discussed.    Pt verbalized understanding of concepts discussed and recommendations provided today.       Continue education with the following diabetes management concepts: Insulin Pump Concepts Hands on practice with basic pump button use  Insulin pump setting adjustments    Pump Education Materials: Settings    ASSESSMENT  Patient is wearing pump and sensor appropriately but current insulin settings not adequate to obtain glycemic control    Insulin pump setting changes  Basal 0.75 units/h increased to 1.0 units/h  Correction factor 50 changed to 25  Insulin to carb ratio 9 changed to 6.5  PLAN    Insulin pump setting changes patient to follow-up for ongoing pump changes   Follow-up: 1 mo     See Care Plan for co-developed, patient-state behavior change goals.  AVS provided for patient today.    Education Materials Provided:  No new materials provided today      SUBJECTIVE/OBJECTIVE:     Cultural Influences/Ethnic Background:  Not  or     Diabetes Symptoms & Complications:          Patient Problem List and Family Medical History reviewed for relevant medical history, current medical status, and diabetes risk factors.    Vitals:  Ht 1.753 m (5'  "9\")   Wt 102.2 kg (225 lb 3.2 oz)   BMI 33.26 kg/m    Estimated body mass index is 33.26 kg/m  as calculated from the following:    Height as of this encounter: 1.753 m (5' 9\").    Weight as of this encounter: 102.2 kg (225 lb 3.2 oz).   Last 3 BP:   BP Readings from Last 3 Encounters:   11/16/22 (!) 136/95   11/08/22 (!) 128/100   02/09/21 136/77       History   Smoking Status     Former     Packs/day: 1.00     Types: Cigarettes   Smokeless Tobacco     Never       Labs:  Lab Results   Component Value Date    A1C 14.3 11/08/2022     Lab Results   Component Value Date     11/08/2022     02/09/2021     Lab Results   Component Value Date    LDL  11/08/2022      Comment:      Cannot estimate LDL when triglyceride exceeds 400 mg/dL    LDL 55 11/08/2022     Direct Measure HDL   Date Value Ref Range Status   11/08/2022 23 (L) >=40 mg/dL Final   ]  GFR Estimate   Date Value Ref Range Status   11/08/2022 >90 >60 mL/min/1.73m2 Final     Comment:     Effective December 21, 2021 eGFRcr in adults is calculated using the 2021 CKD-EPI creatinine equation which includes age and gender (Cammy et al., [place], DOI: 10.1056/HHAVss0993721)   09/22/2020 >60 >60 Final     Comment:     _  Unit of Measure:   ml/min/1.73m2       No results found for: GFRESTBLACK  Lab Results   Component Value Date    CR 0.62 11/08/2022     No results found for: MICROALBUMIN    Healthy Eating:       Being Active:       Monitoring:  Using Dexcom G6, BG testing as a backup    Taking Medications:  Diabetes Medication(s)     Biguanides       metFORMIN (GLUCOPHAGE) 500 MG tablet    Take 1 tablet (500 mg) by mouth daily (with breakfast)    Insulin       insulin lispro (HUMALOG KWIKPEN) 100 UNIT/ML (1 unit dial) KWIKPEN    Taking up to 30 units a day divided between meals and snacks.     insulin glargine (LANTUS SOLOSTAR) 100 UNIT/ML pen    See Instructions, Instructions: 20 Subcutaneous daily, # 30 mL, 3 Refill(s), Type: Maintenance, Pharmacy: " [id number], 20 Subcutaneous daily, 69, in, 03/04/21 14:16:00 CST, Height Measured, 231, lb, 03/04/21 14:16:00 CST, Weight Measured Strength: 100 UNIT/ML     insulin lispro (HUMALOG VIAL) 100 UNIT/ML vial    Using 45 units via insulin pump daily.               Problem Solving:  Patient aware of signs and symptoms of hypoglycemia and treatment protocol              Reducing Risks:  Patient is working on improving diabetes control to help prevent complications related to uncontrolled diabetes.  Annual eye exam    Healthy Coping:     Patient Activation Measure Survey Score:  No flowsheet data found.      Care Plan and Education Provided:  Care Plan: Diabetes   Updates made by Cielo Noble RD since 12/14/2022 12:00 AM      Problem: HbA1C Not In Goal       Goal: Get HbA1C Level in Goal       Task: Educate patient on diabetes education self-management topics Completed 12/14/2022   Responsible User: Cielo Noble RD      Problem: Diabetes Self-Management Education Needed to Optimize Self-Care Behaviors       Goal: Healthy Eating - follow a healthy eating pattern for diabetes       Task: Provide education on weight management Completed 12/14/2022   Responsible User: Cielo Noble RD      Goal: Monitoring - monitor glucose and ketones as directed    This Visit's Progress: 100%   Recent Progress: 30%   Note:    Patient to use Dexcom G6 as primary glucose readings and BG testing as a backup     Goal: Taking Medication - patient is consistently taking medications as directed       Task: Provide education on action of prescribed medication, including when to take and possible side effects Completed 12/14/2022   Responsible User: Cielo Noble RD      Task: Discuss barriers to medication adherence with patient and provide management technique ideas as appropriate Completed 12/14/2022   Responsible User: Cielo Noble RD      Goal: Problem Solving - know how to prevent and manage short-term diabetes  complications       Task: Provide education on how to care for diabetes on sick days Completed 12/14/2022   Responsible User: Cielo Noble RD      Goal: Reducing Risks - know how to prevent and treat long-term diabetes complications       Task: Provide education on recommended care for dental, eye and foot health Completed 12/14/2022   Responsible User: Cielo Noble RD          Time Spent: 30 minutes  Encounter Type: Individual    Any diabetes medication dose changes were made via the CDE Protocol per the patient's referring provider. A copy of this encounter was shared with the provider.

## 2023-01-05 ENCOUNTER — ANCILLARY PROCEDURE (OUTPATIENT)
Dept: GENERAL RADIOLOGY | Facility: CLINIC | Age: 38
End: 2023-01-05
Attending: INTERNAL MEDICINE
Payer: COMMERCIAL

## 2023-01-05 ENCOUNTER — OFFICE VISIT (OUTPATIENT)
Dept: FAMILY MEDICINE | Facility: CLINIC | Age: 38
End: 2023-01-05
Payer: COMMERCIAL

## 2023-01-05 VITALS
DIASTOLIC BLOOD PRESSURE: 78 MMHG | HEIGHT: 69 IN | HEART RATE: 51 BPM | WEIGHT: 217 LBS | BODY MASS INDEX: 32.14 KG/M2 | SYSTOLIC BLOOD PRESSURE: 134 MMHG

## 2023-01-05 DIAGNOSIS — M25.532 LEFT WRIST PAIN: ICD-10-CM

## 2023-01-05 DIAGNOSIS — M25.532 LEFT WRIST PAIN: Primary | ICD-10-CM

## 2023-01-05 PROCEDURE — 73110 X-RAY EXAM OF WRIST: CPT | Mod: TC | Performed by: RADIOLOGY

## 2023-01-05 PROCEDURE — 99213 OFFICE O/P EST LOW 20 MIN: CPT | Performed by: INTERNAL MEDICINE

## 2023-01-05 NOTE — PROGRESS NOTES
"  Assessment & Plan     Left wrist pain  Prior injury to back of left wrist 1 month ago, blunt trauma from hammer  -New worsening of symptoms past 2 days after weightlifting with soft tissue swelling along the wrist  -X-rays obtained today in clinic showing no obvious fracture per my read; await radiology overread  -Recommending avoidance of weightlifting and specially putting increased tension on wrist.  Offered wrist brace for stabilization and comfort which she declines at this time.  Recommending ice and general rest of left wrist for now.  -If symptoms persisting or any described abnormalities on  official radiology film would offer Ortho hand referral.    - XR Wrist Left G/E 3 Views; Future             BMI:   Estimated body mass index is 32.05 kg/m  as calculated from the following:    Height as of this encounter: 1.753 m (5' 9\").    Weight as of this encounter: 98.4 kg (217 lb).     No follow-ups on file.    Mike Tobar MD  [facility] - FRANCES Bloom is a 37 year old, presenting for the following health issues: Describes hitting back of left wrist with a hammer while at work approximately 1 month ago.  Describe localized bruising and pain though did not prevent him from working or any other daily activities.  Yesterday was working out doing bench press lifting and noticed a pop and pain in that same location of left wrist.  Has noticed a bulge along the back of the area with focal pains.  He is right-handed.  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  Musculoskeletal Problem (Left wrist pain. Hit it about a month ago. Pain worse after working out yesterday. )      Musculoskeletal Problem    History of Present Illness       Reason for visit:  Injured wrist  Symptom onset:  3-4 weeks ago    He eats 2-3 servings of fruits and vegetables daily.He consumes 1 sweetened beverage(s) daily.He exercises with enough effort to increase his heart " "rate 10 to 19 minutes per day.  He exercises with enough effort to increase his heart rate 6 days per week. He is missing 1 dose(s) of medications per week.       Review of Systems   Constitutional, HEENT, cardiovascular, pulmonary, gi and gu systems are negative, except as otherwise noted.      Objective    /78 (BP Location: Right arm, Patient Position: Sitting, Cuff Size: Adult Regular)   Pulse 51   Ht 1.753 m (5' 9\")   Wt 98.4 kg (217 lb)   BMI 32.05 kg/m    Body mass index is 32.05 kg/m .  Physical Exam   GENERAL: healthy, alert and no distress  MS: no gross musculoskeletal defects noted, no edema  Left hand with normal PROM of left wrist/hand.  Focal swelling with soft tissue bulge along dorsal aspect of left wrist.  Pains with wrist flexion.                      "

## 2023-04-12 ENCOUNTER — OFFICE VISIT (OUTPATIENT)
Dept: FAMILY MEDICINE | Facility: CLINIC | Age: 38
End: 2023-04-12
Payer: COMMERCIAL

## 2023-04-12 VITALS
TEMPERATURE: 99.1 F | HEIGHT: 69 IN | HEART RATE: 80 BPM | WEIGHT: 221 LBS | OXYGEN SATURATION: 97 % | SYSTOLIC BLOOD PRESSURE: 130 MMHG | BODY MASS INDEX: 32.73 KG/M2 | RESPIRATION RATE: 18 BRPM | DIASTOLIC BLOOD PRESSURE: 78 MMHG

## 2023-04-12 DIAGNOSIS — E08.9 DIABETES MELLITUS DUE TO UNDERLYING CONDITION WITHOUT COMPLICATION, WITH LONG-TERM CURRENT USE OF INSULIN (H): Primary | ICD-10-CM

## 2023-04-12 DIAGNOSIS — Z82.49 FAMILY HISTORY OF ISCHEMIC HEART DISEASE: ICD-10-CM

## 2023-04-12 DIAGNOSIS — I10 BENIGN ESSENTIAL HYPERTENSION: ICD-10-CM

## 2023-04-12 DIAGNOSIS — E78.1 PURE HYPERTRIGLYCERIDEMIA: ICD-10-CM

## 2023-04-12 DIAGNOSIS — E78.5 ELEVATED LIPIDS: ICD-10-CM

## 2023-04-12 DIAGNOSIS — Z79.4 DIABETES MELLITUS DUE TO UNDERLYING CONDITION WITHOUT COMPLICATION, WITH LONG-TERM CURRENT USE OF INSULIN (H): Primary | ICD-10-CM

## 2023-04-12 LAB
ANION GAP SERPL CALCULATED.3IONS-SCNC: 12 MMOL/L (ref 7–15)
BUN SERPL-MCNC: 16.1 MG/DL (ref 6–20)
CALCIUM SERPL-MCNC: 9.5 MG/DL (ref 8.6–10)
CHLORIDE SERPL-SCNC: 96 MMOL/L (ref 98–107)
CHOLEST SERPL-MCNC: 201 MG/DL
CREAT SERPL-MCNC: 0.59 MG/DL (ref 0.67–1.17)
DEPRECATED HCO3 PLAS-SCNC: 24 MMOL/L (ref 22–29)
GFR SERPL CREATININE-BSD FRML MDRD: >90 ML/MIN/1.73M2
GLUCOSE SERPL-MCNC: 388 MG/DL (ref 70–99)
HBA1C MFR BLD: 11.8 % (ref 0–5.6)
HDLC SERPL-MCNC: 35 MG/DL
LDLC SERPL CALC-MCNC: 131 MG/DL
NONHDLC SERPL-MCNC: 166 MG/DL
POTASSIUM SERPL-SCNC: 4.5 MMOL/L (ref 3.4–5.3)
SODIUM SERPL-SCNC: 132 MMOL/L (ref 136–145)
TRIGL SERPL-MCNC: 177 MG/DL

## 2023-04-12 PROCEDURE — 99214 OFFICE O/P EST MOD 30 MIN: CPT | Performed by: FAMILY MEDICINE

## 2023-04-12 PROCEDURE — 83036 HEMOGLOBIN GLYCOSYLATED A1C: CPT | Performed by: FAMILY MEDICINE

## 2023-04-12 PROCEDURE — 36415 COLL VENOUS BLD VENIPUNCTURE: CPT | Performed by: FAMILY MEDICINE

## 2023-04-12 PROCEDURE — 80048 BASIC METABOLIC PNL TOTAL CA: CPT | Performed by: FAMILY MEDICINE

## 2023-04-12 PROCEDURE — 80061 LIPID PANEL: CPT | Performed by: FAMILY MEDICINE

## 2023-04-12 RX ORDER — INSULIN INFUSION SET/CARTRIDGE
1 COMBINATION PACKAGE (EA) MISCELLANEOUS
Qty: 10 EACH | Refills: 3 | Status: SHIPPED | OUTPATIENT
Start: 2023-04-12 | End: 2023-09-22

## 2023-04-12 RX ORDER — PROCHLORPERAZINE 25 MG/1
SUPPOSITORY RECTAL
Qty: 3 EACH | Refills: 3 | Status: SHIPPED | OUTPATIENT
Start: 2023-04-12 | End: 2023-09-22

## 2023-04-12 RX ORDER — INSULIN ASPART 100 [IU]/ML
INJECTION, SOLUTION INTRAVENOUS; SUBCUTANEOUS
Qty: 50 ML | Refills: 1 | Status: CANCELLED | OUTPATIENT
Start: 2023-04-12

## 2023-04-12 RX ORDER — INSULIN PUMP CARTRIDGE
1 CARTRIDGE (EA) SUBCUTANEOUS
Qty: 10 EACH | Refills: 3 | Status: SHIPPED | OUTPATIENT
Start: 2023-04-12 | End: 2023-09-22

## 2023-04-12 RX ORDER — INSULIN LISPRO 100 [IU]/ML
INJECTION, SOLUTION INTRAVENOUS; SUBCUTANEOUS
Qty: 50 ML | Refills: 1 | Status: SHIPPED | OUTPATIENT
Start: 2023-04-12 | End: 2023-09-22

## 2023-04-12 RX ORDER — ROSUVASTATIN CALCIUM 20 MG/1
20 TABLET, COATED ORAL DAILY
Qty: 90 TABLET | Refills: 1 | Status: SHIPPED | OUTPATIENT
Start: 2023-04-12 | End: 2024-04-16

## 2023-04-12 RX ORDER — LOSARTAN POTASSIUM AND HYDROCHLOROTHIAZIDE 12.5; 5 MG/1; MG/1
1 TABLET ORAL DAILY
Qty: 90 TABLET | Refills: 3 | Status: SHIPPED | OUTPATIENT
Start: 2023-04-12 | End: 2024-04-16

## 2023-04-12 RX ORDER — INSULIN GLARGINE 100 [IU]/ML
INJECTION, SOLUTION SUBCUTANEOUS
Qty: 30 ML | Refills: 1 | Status: SHIPPED | OUTPATIENT
Start: 2023-04-12 | End: 2024-05-29

## 2023-04-12 RX ORDER — PROCHLORPERAZINE 25 MG/1
SUPPOSITORY RECTAL
Qty: 1 EACH | Refills: 1 | Status: SHIPPED | OUTPATIENT
Start: 2023-04-12 | End: 2023-09-22

## 2023-04-12 RX ORDER — INSULIN LISPRO 100 [IU]/ML
INJECTION, SOLUTION INTRAVENOUS; SUBCUTANEOUS
Qty: 30 ML | Refills: 1 | Status: SHIPPED | OUTPATIENT
Start: 2023-04-12 | End: 2023-11-27

## 2023-04-12 RX ORDER — BLOOD SUGAR DIAGNOSTIC
STRIP MISCELLANEOUS
Qty: 100 STRIP | Refills: 11 | Status: SHIPPED | OUTPATIENT
Start: 2023-04-12

## 2023-04-12 ASSESSMENT — ENCOUNTER SYMPTOMS
EYE PAIN: 0
SORE THROAT: 0
DIARRHEA: 0
CONSTIPATION: 0
HEARTBURN: 0
ARTHRALGIAS: 0
ABDOMINAL PAIN: 0
COUGH: 0
NERVOUS/ANXIOUS: 0
PARESTHESIAS: 0
FEVER: 0
CHILLS: 0
PALPITATIONS: 0
DIZZINESS: 0
JOINT SWELLING: 0
HEMATOCHEZIA: 0
WEAKNESS: 0
FREQUENCY: 0
DYSURIA: 0
HEMATURIA: 0
NAUSEA: 0
MYALGIAS: 0
SHORTNESS OF BREATH: 0
HEADACHES: 0

## 2023-04-12 NOTE — LETTER
April 12, 2023      Wolf Gibbs  [address]  [place] [facility] [address]        Dear ,    We are writing to inform you of your test results.    Test results indicate you may require additional follow up, see comment below.  Your triglycerides have improved markedly but your LDL cholesterol is still elevated.  I have sent a prescription for a cholesterol-lowering medicine called Crestor to the pharmacy for you please take it every evening.  Your A1c remains quite elevated.  Please set up a follow-up visit with Kori Noble to discuss further treatment and management.  Recheck your A1c in 3 months    Resulted Orders   Hemoglobin A1c   Result Value Ref Range    Hemoglobin A1C 11.8 (H) 0.0 - 5.6 %   Lipid panel reflex to direct LDL Fasting   Result Value Ref Range    Cholesterol 201 (H) <200 mg/dL    Triglycerides 177 (H) <150 mg/dL    Direct Measure HDL 35 (L) >=40 mg/dL    LDL Cholesterol Calculated 131 (H) <=100 mg/dL    Non HDL Cholesterol 166 (H) <130 mg/dL    Narrative    Cholesterol  Desirable:  <200 mg/dL    Triglycerides  Normal:  Less than 150 mg/dL  Borderline High:  150-199 mg/dL  High:  200-499 mg/dL  Very High:  Greater than or equal to 500 mg/dL    Direct Measure HDL  Female:  Greater than or equal to 50 mg/dL   Male:  Greater than or equal to 40 mg/dL    LDL Cholesterol  Desirable:  <100mg/dL  Above Desirable:  100-129 mg/dL   Borderline High:  130-159 mg/dL   High:  160-189 mg/dL   Very High:  >= 190 mg/dL    Non HDL Cholesterol  Desirable:  130 mg/dL  Above Desirable:  130-159 mg/dL  Borderline High:  160-189 mg/dL  High:  190-219 mg/dL  Very High:  Greater than or equal to 220 mg/dL   Basic metabolic panel   Result Value Ref Range    Sodium 132 (L) 136 - 145 mmol/L    Potassium 4.5 3.4 - 5.3 mmol/L    Chloride 96 (L) 98 - 107 mmol/L    Carbon Dioxide (CO2) 24 22 - 29 mmol/L    Anion Gap 12 7 - 15 mmol/L    Urea Nitrogen 16.1 6.0 - 20.0 mg/dL    Creatinine 0.59 (L) 0.67 - 1.17 mg/dL     Calcium 9.5 8.6 - 10.0 mg/dL    Glucose 388 (H) 70 - 99 mg/dL    GFR Estimate >90 >60 mL/min/1.73m2      Comment:      eGFR calculated using 2021 CKD-EPI equation.       If you have any questions or concerns, please call the clinic at the number listed above.       Sincerely,      Tavares Jarquin MD

## 2023-04-12 NOTE — PROGRESS NOTES
Assessment & Plan     Diabetes mellitus due to underlying condition without complication, with long-term current use of insulin (H)  Under improving control complaint seems to be better still concerned over sugars running 200 on a regular basis.  We will get today's labs likely referral back to diabetic education.  We also discussed starting Crestor with him.  He wants to wait till he sees his numbers today.  Warned him of side effects as well  - Hemoglobin A1c  - Lipid panel reflex to direct LDL Fasting  - Basic metabolic panel  - Adult Eye  Referral; Future  - blood glucose (NO BRAND SPECIFIED) test strip; Instructions: testing 4-6x/ day  - Continuous Blood Gluc Sensor (DEXCOM G6 SENSOR) MISC; change every 10 days  - Continuous Blood Gluc Transmit (DEXCOM G6 TRANSMITTER) MISC; change every 3 months  - insulin glargine (LANTUS SOLOSTAR) 100 UNIT/ML pen; See Instructions, Instructions: 20 Subcutaneous daily, # 30 mL, 3 Refill(s), Type: Maintenance, Pharmacy: [facility][id number], 20 Subcutaneous daily, 69, in, 03/04/21 14:16:00 CST, Height Measured, 231, lb, 03/04/21 14:16:00 CST, Weight Measured Strength: 100 UNIT/ML  - Insulin Infusion Pump Supplies (AUTOSOFT XC INFUSION SET) MISC; 1 each every 3 days  - Insulin Infusion Pump Supplies (T:SLIM X2 3ML CARTRIDGE) MISC; 1 each every 3 days  - insulin lispro (HUMALOG KWIKPEN) 100 UNIT/ML (1 unit dial) KWIKPEN; Taking up to 30 units a day divided between meals and snacks.  - insulin lispro (HUMALOG VIAL) 100 UNIT/ML vial; Using 45 units via insulin pump daily.  - insulin pen needle (32G X 4 MM) 32G X 4 MM miscellaneous; injecting 4-6x/ day  - losartan-hydrochlorothiazide (HYZAAR) 50-12.5 MG tablet; Take 1 tablet by mouth daily  - metFORMIN (GLUCOPHAGE) 500 MG tablet; Take 1 tablet (500 mg) by mouth daily (with breakfast)  - blood glucose (GLUCOSE METER TEST) test strip; testing 4-6x/ day    Pure hypertriglyceridemia  As above    Benign essential  "hypertension  Under reasonable control  - losartan-hydrochlorothiazide (HYZAAR) 50-12.5 MG tablet; Take 1 tablet by mouth daily    Family history of ischemic heart disease                 BMI:   Estimated body mass index is 32.64 kg/m  as calculated from the following:    Height as of this encounter: 1.753 m (5' 9\").    Weight as of this encounter: 100.2 kg (221 lb).           Tavares Jarquin MD  [facility]    Stewart Bloom is a 37 year old, presenting for the following health issues: Overall has been doing well.  He checks his sugars daily.  Often running around 180-220.  Rarely 300.  Lowest has been 68.  He has not seen his eye doctor.  Physical        4/12/2023    10:59 AM   Additional Questions   Roomed by Dolores     Healthy Habits:     Getting at least 3 servings of Calcium per day:  Yes    Bi-annual eye exam:  NO    Dental care twice a year:  Yes    Sleep apnea or symptoms of sleep apnea:  None    Diet:  Diabetic    Frequency of exercise:  6-7 days/week    Duration of exercise:  Greater than 60 minutes    Taking medications regularly:  Yes    Medication side effects:  None    PHQ-2 Total Score: 0    Additional concerns today:  No         Patient Active Problem List   Diagnosis     Primary hypertension     Obesity     Diabetes mellitus (H)     Pure hypertriglyceridemia     Family history of ischemic heart disease     Current Outpatient Medications   Medication     blood glucose (GLUCOSE METER TEST) test strip     blood glucose (NO BRAND SPECIFIED) test strip     Continuous Blood Gluc Sensor (DEXCOM G6 SENSOR) MISC     Continuous Blood Gluc Transmit (DEXCOM G6 TRANSMITTER) MISC     insulin glargine (LANTUS SOLOSTAR) 100 UNIT/ML pen     Insulin Infusion Pump Supplies (AUTOSOFT XC INFUSION SET) MISC     Insulin Infusion Pump Supplies (T:SLIM X2 3ML CARTRIDGE) MISC     insulin lispro (HUMALOG KWIKPEN) 100 UNIT/ML (1 unit dial) KWIKPEN     insulin lispro (HUMALOG VIAL) 100 " "UNIT/ML vial     insulin pen needle (32G X 4 MM) 32G X 4 MM miscellaneous     losartan-hydrochlorothiazide (HYZAAR) 50-12.5 MG tablet     metFORMIN (GLUCOPHAGE) 500 MG tablet     UNABLE TO FIND     No current facility-administered medications for this visit.           Review of Systems   Constitutional: Negative for chills and fever.   HENT: Negative for congestion, ear pain, hearing loss and sore throat.    Eyes: Negative for pain and visual disturbance.   Respiratory: Negative for cough and shortness of breath.    Cardiovascular: Negative for chest pain, palpitations and peripheral edema.   Gastrointestinal: Negative for abdominal pain, constipation, diarrhea, heartburn, hematochezia and nausea.   Genitourinary: Negative for dysuria, frequency, genital sores, hematuria, impotence, penile discharge and urgency.   Musculoskeletal: Negative for arthralgias, joint swelling and myalgias.   Skin: Negative for rash.   Neurological: Negative for dizziness, weakness, headaches and paresthesias.   Psychiatric/Behavioral: Negative for mood changes. The patient is not nervous/anxious.             Objective    /78 (BP Location: Right arm, Patient Position: Sitting, Cuff Size: Adult Large)   Pulse 80   Temp 99.1  F (37.3  C)   Resp 18   Ht 1.753 m (5' 9\")   Wt 100.2 kg (221 lb)   SpO2 97%   BMI 32.64 kg/m    Body mass index is 32.64 kg/m .  Physical Exam   GENERAL: healthy, alert and no distress  NECK: no adenopathy, no asymmetry, masses, or scars and thyroid normal to palpation  RESP: lungs clear to auscultation - no rales, rhonchi or wheezes  CV: regular rate and rhythm, normal S1 S2, no S3 or S4, no murmur, click or rub, no peripheral edema and peripheral pulses strong  ABDOMEN: soft, nontender, no hepatosplenomegaly, no masses and bowel sounds normal  MS: no gross musculoskeletal defects noted, no edema  Diabetic foot exam: normal DP and PT pulses    Office Visit on 11/08/2022   Component Date Value Ref Range " Status     Albumin Urine mg/L 11/08/2022 54.0  mg/L Final    The reference ranges have not been established in urine albumin. The results should be integrated into the clinical context for interpretation.     Albumin Urine mg/g Cr 11/08/2022 201.49 (H)  0.00 - 17.00 mg/g Cr Final    Microalbuminuria is defined as an albumin:creatinine ratio of 17 to 299 for males and 25 to 299 for females. A ratio of albumin:creatinine of 300 or higher is indicative of overt proteinuria.  Due to biologic variability, positive results should be confirmed by a second, first-morning random or 24-hour timed urine specimen. If there is discrepancy, a third specimen is recommended. When 2 out of 3 results are in the microalbuminuria range, this is evidence for incipient nephropathy and warrants increased efforts at glucose control, blood pressure control, and institution of therapy with an angiotensin-converting-enzyme (ACE) inhibitor (if the patient can tolerate it).       Creatinine Urine mg/dL 11/08/2022 26.8  mg/dL Final    The reference ranges have not been established in urine creatinine. The results should be integrated into the clinical context for interpretation.     Sodium 11/08/2022 131 (L)  136 - 145 mmol/L Final     Potassium 11/08/2022 4.2  3.4 - 5.3 mmol/L Final     Chloride 11/08/2022 90 (L)  98 - 107 mmol/L Final     Carbon Dioxide (CO2) 11/08/2022 23  22 - 29 mmol/L Final     Anion Gap 11/08/2022 18 (H)  7 - 15 mmol/L Final     Urea Nitrogen 11/08/2022 21.0 (H)  6.0 - 20.0 mg/dL Final     Creatinine 11/08/2022 0.62 (L)  0.67 - 1.17 mg/dL Final     Calcium 11/08/2022 9.2  8.6 - 10.0 mg/dL Final     Glucose 11/08/2022 498 (H)  70 - 99 mg/dL Final     Alkaline Phosphatase 11/08/2022 92  40 - 129 U/L Final     AST 11/08/2022 14  10 - 50 U/L Final     ALT 11/08/2022 19  10 - 50 U/L Final     Protein Total 11/08/2022 6.9  6.4 - 8.3 g/dL Final     Albumin 11/08/2022 4.4  3.5 - 5.2 g/dL Final     Bilirubin Total 11/08/2022 1.4  (H)  <=1.2 mg/dL Final     GFR Estimate 11/08/2022 >90  >60 mL/min/1.73m2 Final    Effective December 21, 2021 eGFRcr in adults is calculated using the 2021 CKD-EPI creatinine equation which includes age and gender (Cammy et al., [place], DOI: 10.1056/DUXJnq0721617)     Cholesterol 11/08/2022 282 (H)  <200 mg/dL Final     Triglycerides 11/08/2022 1,509 (H)  <150 mg/dL Final     Direct Measure HDL 11/08/2022 23 (L)  >=40 mg/dL Final     LDL Cholesterol Calculated 11/08/2022    Final    Cannot estimate LDL when triglyceride exceeds 400 mg/dL     Non HDL Cholesterol 11/08/2022 259 (H)  <130 mg/dL Final     Hemoglobin A1C 11/08/2022 14.3 (H)  0.0 - 5.6 % Final     WBC Count 11/08/2022 7.2  4.0 - 11.0 10e3/uL Final     RBC Count 11/08/2022 5.24  4.40 - 5.90 10e6/uL Final     Hemoglobin 11/08/2022 15.8  13.3 - 17.7 g/dL Final     Hematocrit 11/08/2022 43.4  40.0 - 53.0 % Final     MCV 11/08/2022 83  78 - 100 fL Final     MCH 11/08/2022 30.2  26.5 - 33.0 pg Final     MCHC 11/08/2022 36.4  31.5 - 36.5 g/dL Final     RDW 11/08/2022 12.1  10.0 - 15.0 % Final     Platelet Count 11/08/2022 174  150 - 450 10e3/uL Final     % Neutrophils 11/08/2022 62  % Final     % Lymphocytes 11/08/2022 28  % Final     % Monocytes 11/08/2022 8  % Final     % Eosinophils 11/08/2022 2  % Final     % Basophils 11/08/2022 1  % Final     % Immature Granulocytes 11/08/2022 0  % Final     Absolute Neutrophils 11/08/2022 4.4  1.6 - 8.3 10e3/uL Final     Absolute Lymphocytes 11/08/2022 2.0  0.8 - 5.3 10e3/uL Final     Absolute Monocytes 11/08/2022 0.6  0.0 - 1.3 10e3/uL Final     Absolute Eosinophils 11/08/2022 0.1  0.0 - 0.7 10e3/uL Final     Absolute Basophils 11/08/2022 0.1  0.0 - 0.2 10e3/uL Final     Absolute Immature Granulocytes 11/08/2022 0.0  <=0.4 10e3/uL Final     LDL Cholesterol Direct 11/08/2022 55  <100 mg/dL Final    Age 2-19 years:  Desirable: 0-110 mg/dL   Borderline high: 110-129 mg/dL   High: >= 130 mg/dL    Age 20 years and  older:  Desirable: <100mg/dL  Above desirable: 100-129 mg/dL   Borderline high: 130-159 mg/dL   High: 160-189 mg/dL   Very high: >= 190 mg/dL

## 2023-04-17 ENCOUNTER — TRANSFERRED RECORDS (OUTPATIENT)
Dept: HEALTH INFORMATION MANAGEMENT | Facility: CLINIC | Age: 38
End: 2023-04-17
Payer: COMMERCIAL

## 2023-04-18 ENCOUNTER — TELEPHONE (OUTPATIENT)
Dept: FAMILY MEDICINE | Facility: CLINIC | Age: 38
End: 2023-04-18
Payer: COMMERCIAL

## 2023-04-18 NOTE — TELEPHONE ENCOUNTER
Diabetes Education Scheduling Outreach #1:    Call to patient to schedule. Left message with phone number to call to schedule.    Plan for 2nd outreach attempt within 2 business days.    Nara Forbes OnCall  Diabetes and Nutrition Scheduling

## 2023-04-21 NOTE — TELEPHONE ENCOUNTER
04/21/23  Pt returned call. Assisted pt in scheduling diabetic education with Shelby Noble on 04/24.  Benita

## 2023-04-21 NOTE — TELEPHONE ENCOUNTER
Diabetes Education Scheduling Outreach #2:    Call to patient to schedule. Left message with phone number to call to schedule.        Nara Forbes OnCall  Diabetes and Nutrition Scheduling

## 2023-04-24 ENCOUNTER — ALLIED HEALTH/NURSE VISIT (OUTPATIENT)
Dept: EDUCATION SERVICES | Facility: CLINIC | Age: 38
End: 2023-04-24
Attending: FAMILY MEDICINE
Payer: COMMERCIAL

## 2023-04-24 VITALS — HEIGHT: 69 IN | WEIGHT: 228.2 LBS | BODY MASS INDEX: 33.8 KG/M2

## 2023-04-24 DIAGNOSIS — Z79.4 DIABETES MELLITUS DUE TO UNDERLYING CONDITION WITHOUT COMPLICATION, WITH LONG-TERM CURRENT USE OF INSULIN (H): Primary | ICD-10-CM

## 2023-04-24 DIAGNOSIS — E08.9 DIABETES MELLITUS DUE TO UNDERLYING CONDITION WITHOUT COMPLICATION, WITH LONG-TERM CURRENT USE OF INSULIN (H): Primary | ICD-10-CM

## 2023-04-24 PROCEDURE — G0108 DIAB MANAGE TRN  PER INDIV: HCPCS | Performed by: DIETITIAN, REGISTERED

## 2023-04-24 NOTE — LETTER
4/24/2023         RE: Wolf Gibbs  [address]  [facility] [address]        Dear Colleague,    Thank you for referring your patient, Wolf Gibbs, to the [facility]. Please see a copy of my visit note below.    Diabetes Self-Management Education & Support    Presents for:  TIFFANY diabetes    Type of Service: In Person Visit    Assessment Type: Follow up for insulin pump/ sensor.  Patient has had a difficult time with changes in job and insurance difficulty obtaining pump and sensor supplies patient had been off of the pump for many months resulting in poor diabetes control.  Patient was on MDI insulin but not adequate amounts.  Hemoglobin A1c 11.8% above target of less than 7%.  Patient just started on insulin pump again 3 days ago.  Difficult to make significant changes with very small amount of data.  Patient is bolusing for larger amount of carbohydrates than what is consumed to help prevent postprandial hyperglycemia.  Adjusted insulin to carbohydrate ratio today.  Patient to follow-up in 1 month    REPORTS:               Insulin Pump Information  Insulin Pump Brand: Tandem  Infusion Set: Tandem  Tandem Infusion Set: Auto Soft XC  Does patient have an insulin multiple daily injection back-up plan?: Yes    Education specific to insulin pump provided today on:   importance of changing infusion set every 3 days, importance of bolusing before meals, treating hypoglycemia correctly (Rule of 15), multiple daily injection back-up plan in case of pump failure and exercise recommendations    Opportunities for ongoing education and support in diabetes-self management were discussed.    Pt verbalized understanding of concepts discussed and recommendations provided today.         ASSESSMENT    Continue education with the following diabetes management concepts: Healthy Eating, Taking Medication, Problem Solving and Insulin Pump Concepts insulin pump changes as needed based on  glucose trend and prevention of hypoglycemia    Patient would benefit from Adjustment to insulin to carbohydrate ratio 1 unit to 6.5 g of carbohydrate changed to 1 unit to 6 g of carbohydrate      PLAN    Patient follow-up in 1 month for additional changes in pump settings.  Patient set up for notification of weight Dexcom G7 when it is integrated with the tandem pump  Topics to cover at upcoming visits: Healthy Eating, Being Active, Monitoring, Taking Medication, Problem Solving, Reducing Risks and Insulin Pump Concepts Problem solving with insulin pump therapy (BG monitoring; hypoglycemia signs/symptoms, treatment (glucagon) and prevention; hyperglycemia signs/symptoms, treatment and prevention; ketones, DKA signs/symptoms and prevention) insulin pump changes    Follow-up: 1 month    See Care Plan for co-developed, patient-state behavior change goals.  AVS provided for patient today.    Education Materials Provided:  Goals for Your Diabetes Care, Carbohydrate Counting and My Plate Planner      SUBJECTIVE/OBJECTIVE:  Accompanied by: Self  Diabetes education in the past 24mo: Yes  Diabetes type: Other (TIFFANY)  Disease course: Getting harder to manage  How confident are you filling out medical forms by yourself:: Extremely  Transportation concerns: No  Difficulty affording diabetes medication?: No (pump supplies)  Difficulty affording diabetes testing supplies?:  (sensor supplies)  Cultural Influences/Ethnic Background:  Not  or     Diabetes Symptoms & Complications:  Fatigue: No  Neuropathy: No  Polydipsia: Sometimes  Polyphagia: No  Polyuria: No  Visual change: No  Slow healing wounds: No  Complications assessed today?: Yes  Autonomic neuropathy: No  CVA: No  Heart disease: No  Nephropathy: No  Peripheral neuropathy: No  Peripheral Vascular Disease: No  Retinopathy: No  Sexual dysfunction: No    Patient Problem List and Family Medical History reviewed for relevant medical history, current medical  "status, and diabetes risk factors.    Vitals:  Ht 1.753 m (5' 9\")   Wt 103.5 kg (228 lb 3.2 oz)   BMI 33.70 kg/m    Estimated body mass index is 33.7 kg/m  as calculated from the following:    Height as of this encounter: 1.753 m (5' 9\").    Weight as of this encounter: 103.5 kg (228 lb 3.2 oz).   Last 3 BP:   BP Readings from Last 3 Encounters:   04/12/23 130/78   01/05/23 134/78   11/16/22 (!) 136/95       History   Smoking Status     Former     Packs/day: 1.00     Types: Cigarettes   Smokeless Tobacco     Never       Labs:  Lab Results   Component Value Date    A1C 11.8 04/12/2023     Lab Results   Component Value Date     04/12/2023     02/09/2021     Lab Results   Component Value Date     04/12/2023     Direct Measure HDL   Date Value Ref Range Status   04/12/2023 35 (L) >=40 mg/dL Final   ]  GFR Estimate   Date Value Ref Range Status   04/12/2023 >90 >60 mL/min/1.73m2 Final     Comment:     eGFR calculated using 2021 CKD-EPI equation.   09/22/2020 >60 >60 Final     Comment:     _  Unit of Measure:   ml/min/1.73m2       No results found for: GFRESTBLACK  Lab Results   Component Value Date    CR 0.59 04/12/2023     No results found for: MICROALBUMIN    Healthy Eating:  Healthy Eating Assessed Today: Yes  Cultural/Alevism diet restrictions?: Yes  Meal planning/habits: Carb counting  How many times a week on average do you eat food made away from home (restaurant/take-out)?: 2  Meals include: Breakfast, Lunch, Dinner, Evening Snack  Beverages: Water, Milk, Energy drinks    Being Active:  Being Active Assessed Today: Yes  Exercise:: Yes  Days per week of moderate to strenuous exercise (like a brisk walk): 5  On average, minutes per day of exercise at this level: 40  How intense was your typical exercise? : Moderate (like brisk walking)  Exercise Minutes per Week: 200  Barrier to exercise: None    Monitoring:  Monitoring Assessed Today: Yes  Did patient bring glucose meter to appointment? : " Yes  Blood Glucose Meter: CGM  Times checking blood sugar at home (number): 5+  Times checking blood sugar at home (per): Day  Blood glucose trend: Fluctuating    Taking Medications:  Diabetes Medication(s)     Biguanides       metFORMIN (GLUCOPHAGE) 500 MG tablet    Take 1 tablet (500 mg) by mouth daily (with breakfast)    Insulin       insulin glargine (LANTUS SOLOSTAR) 100 UNIT/ML pen    See Instructions, Instructions: 20 Subcutaneous daily, # 30 mL, 3 Refill(s), Type: Maintenance, Pharmacy: [id number], 20 Subcutaneous daily, 69, in, 03/04/21 14:16:00 CST, Height Measured, 231, lb, 03/04/21 14:16:00 CST, Weight Measured Strength: 100 UNIT/ML     insulin lispro (HUMALOG KWIKPEN) 100 UNIT/ML (1 unit dial) KWIKPEN    Taking up to 30 units a day divided between meals and snacks.     insulin lispro (HUMALOG VIAL) 100 UNIT/ML vial    Using 45 units via insulin pump daily.          Taking Medication Assessed Today: Yes  Current Treatments: Insulin Pump  Problems taking diabetes medications regularly?: Yes    Problem Solving:  Problem Solving Assessed Today: Yes  Is the patient at risk for hypoglycemia?: Yes  Hypoglycemia Treatment: Glucose (tablets or gel), Juice, Candy  Medical ID: No  Does patient have glucagon emergency kit?: Yes  Is the patient at risk for DKA?: Yes  Does patient have ketone test strips?: No  Does patient have DKA prevention plan?: Yes  Does patient have severe weather/disaster plan for diabetes management?: Yes  Does patient have sick day plan for diabetes management?: Yes              Reducing Risks:  Reducing Risks Assessed Today: Yes  CAD Risks: Diabetes Mellitus, Hypertension    Healthy Coping:  Informal Support system:: [place] based, Family, Friends  Patient Activation Measure Survey Score:       View : No data to display.                  Care Plan and Education Provided:  Care Plan: Diabetes   Updates made by Cielo Noble RD since 4/29/2023 12:00 AM      Problem: HbA1C Not  In Goal       Goal: Establish Regular Follow-Ups with PCP       Task: Discuss with PCP the recommended timing for patient's next follow up visit(s)    Responsible User: Cielo Noble RD      Task: Discuss schedule for PCP visits with patient Completed 4/29/2023   Responsible User: Cielo Noble RD      Goal: Get HbA1C Level in Goal       Task: Educate patient on diabetes education self-management topics    Responsible User: Cielo Noble RD      Task: Educate patient on benefits of regular glucose monitoring    Responsible User: Cielo Noble RD      Task: Refer patient to appropriate extended care team member, as needed (Medication Therapy Management, Behavioral Health, Physical Therapy, etc.)    Responsible User: Cielo Noble RD      Task: Discuss diabetes treatment plan with patient Completed 4/29/2023   Responsible User: Cielo Noble RD      Problem: Diabetes Self-Management Education Needed to Optimize Self-Care Behaviors       Goal: Understand diabetes pathophysiology and disease progression       Task: Provide education on diabetes pathophysiology and disease progression specfic to patient's diabetes type    Responsible User: Cielo Noble RD      Goal: Healthy Eating - follow a healthy eating pattern for diabetes       Task: Provide education on portion control and consistency in amount, composition and timing of food intake    Responsible User: Cielo Noble RD      Task: Provide education on managing carbohydrate intake (carbohydrate counting, plate planning method, etc.)    Responsible User: Cielo Noble RD      Task: Provide education on weight management    Responsible User: Cielo Noble RD      Task: Provide education on heart healthy eating Completed 4/29/2023   Responsible User: Cielo Noble RD      Task: Provide education on eating out    Responsible User: Cielo Noble RD      Task: Develop individualized healthy eating plan with patient    Responsible User: Real  KENNETH Buck      Goal: Being Active - get regular physical activity, working up to at least 150 minutes per week       Task: Provide education on relationship of activity to glucose and precautions to take if at risk for low glucose Completed 4/29/2023   Responsible User: Cielo Noble RD      Task: Discuss barriers to physical activity with patient    Responsible User: Cielo Noble RD      Task: Develop physical activity plan with patient    Responsible User: Cielo Noble RD      Task: Explore community resources including walking groups, assistance programs, and home videos    Responsible User: Cielo Noble RD      Goal: Monitoring - monitor glucose and ketones as directed    This Visit's Progress: 70%   Note:    Patient to use Dexcom sensor and BG testing as a backup     Task: Provide education on blood glucose monitoring (purpose, proper technique, frequency, glucose targets, interpreting results, when to use glucose control solution, sharps disposal)    Responsible User: Cielo Noble RD      Task: Provide education on continuous glucose monitoring (sensor placement, use of minoo or /reader, understanding glucose trends, alerts and alarms, differences between sensor glucose and blood glucose) Completed 4/29/2023   Responsible User: Cielo Noble RD      Task: Provide education on ketone monitoring (when to monitor, frequency, etc.)    Responsible User: Cielo Noble RD      Goal: Taking Medication - patient is consistently taking medications as directed       Task: Provide education on action of prescribed medication, including when to take and possible side effects    Responsible User: Cielo Noble RD      Task: Provide education on insulin and injectable diabetes medications, including administration, storage, site selection and rotation for injection sites    Responsible User: Cielo Noble RD      Task: Discuss barriers to medication adherence with patient and provide management  technique ideas as appropriate    Responsible User: Cielo Noble RD      Task: Provide education on frequency and refill details of medications    Responsible User: Cielo Noble RD      Goal: Problem Solving - know how to prevent and manage short-term diabetes complications       Task: Provide education on high blood glucose - causes, signs/symptoms, prevention and treatment    Responsible User: Cielo Noble RD      Task: Provide education on low blood glucose - causes, signs/symptoms, prevention, treatment, carrying a carbohydrate source at all times, and medical identification    Responsible User: Cielo Noble RD      Task: Provide education on safe travel with diabetes    Responsible User: Cielo Noble RD      Task: Provide education on how to care for diabetes on sick days    Responsible User: Cielo Noble RD      Task: Provide education on when to call a health care provider    Responsible User: Cielo Nolbe RD      Goal: Reducing Risks - know how to prevent and treat long-term diabetes complications       Task: Provide education on major complications of diabetes, prevention, early diagnostic measures and treatment of complications    Responsible User: Cielo Noble RD      Task: Provide education on recommended care for dental, eye and foot health    Responsible User: Cielo Noble RD      Task: Provide education on Hemoglobin A1c - goals and relationship to blood glucose levels    Responsible User: Cielo Noble RD      Task: Provide education on recommendations for heart health - lipid levels and goals, blood pressure and goals, and aspirin therapy, if indicated    Responsible User: Cielo Noble RD      Task: Provide education on tobacco cessation    Responsible User: Cielo Noble RD      Goal: Healthy Coping - use available resources to cope with the challenges of managing diabetes       Task: Discuss recognizing feelings about having diabetes    Responsible User: Real  KENNETH Buck      Task: Provide education on the benefits of making appropriate lifestyle changes    Responsible User: Cielo Noble RD      Task: Provide education on benefits of utilizing support systems    Responsible User: Cielo Noble RD      Task: Discuss methods for coping with stress    Responsible User: Cielo Noble RD      Task: Provide education on when to seek professional counseling    Responsible User: Cielo Noble RD            Time Spent: 30 minutes  Encounter Type: Individual    Any diabetes medication dose changes were made via the CDE Protocol per the patient's referring provider. A copy of this encounter was shared with the provider.

## 2023-04-30 NOTE — PROGRESS NOTES
Diabetes Self-Management Education & Support    Presents for:  TIFFANY diabetes    Type of Service: In Person Visit    Assessment Type: Follow up for insulin pump/ sensor.  Patient has had a difficult time with changes in job and insurance difficulty obtaining pump and sensor supplies patient had been off of the pump for many months resulting in poor diabetes control.  Patient was on MDI insulin but not adequate amounts.  Hemoglobin A1c 11.8% above target of less than 7%.  Patient just started on insulin pump again 3 days ago.  Difficult to make significant changes with very small amount of data.  Patient is bolusing for larger amount of carbohydrates than what is consumed to help prevent postprandial hyperglycemia.  Adjusted insulin to carbohydrate ratio today.  Patient to follow-up in 1 month    REPORTS:               Insulin Pump Information  Insulin Pump Brand: Tandem  Infusion Set: Tandem  Tandem Infusion Set: Auto Soft XC  Does patient have an insulin multiple daily injection back-up plan?: Yes    Education specific to insulin pump provided today on:   importance of changing infusion set every 3 days, importance of bolusing before meals, treating hypoglycemia correctly (Rule of 15), multiple daily injection back-up plan in case of pump failure and exercise recommendations    Opportunities for ongoing education and support in diabetes-self management were discussed.    Pt verbalized understanding of concepts discussed and recommendations provided today.         ASSESSMENT    Continue education with the following diabetes management concepts: Healthy Eating, Taking Medication, Problem Solving and Insulin Pump Concepts insulin pump changes as needed based on glucose trend and prevention of hypoglycemia    Patient would benefit from Adjustment to insulin to carbohydrate ratio 1 unit to 6.5 g of carbohydrate changed to 1 unit to 6 g of carbohydrate      PLAN    Patient follow-up in 1 month for additional changes in  "pump settings.  Patient set up for notification of weight Dexcom G7 when it is integrated with the tandem pump  Topics to cover at upcoming visits: Healthy Eating, Being Active, Monitoring, Taking Medication, Problem Solving, Reducing Risks and Insulin Pump Concepts Problem solving with insulin pump therapy (BG monitoring; hypoglycemia signs/symptoms, treatment (glucagon) and prevention; hyperglycemia signs/symptoms, treatment and prevention; ketones, DKA signs/symptoms and prevention) insulin pump changes    Follow-up: 1 month    See Care Plan for co-developed, patient-state behavior change goals.  AVS provided for patient today.    Education Materials Provided:  Goals for Your Diabetes Care, Carbohydrate Counting and My Plate Planner      SUBJECTIVE/OBJECTIVE:  Accompanied by: Self  Diabetes education in the past 24mo: Yes  Diabetes type: Other (TIFFANY)  Disease course: Getting harder to manage  How confident are you filling out medical forms by yourself:: Extremely  Transportation concerns: No  Difficulty affording diabetes medication?: No (pump supplies)  Difficulty affording diabetes testing supplies?:  (sensor supplies)  Cultural Influences/Ethnic Background:  Not  or     Diabetes Symptoms & Complications:  Fatigue: No  Neuropathy: No  Polydipsia: Sometimes  Polyphagia: No  Polyuria: No  Visual change: No  Slow healing wounds: No  Complications assessed today?: Yes  Autonomic neuropathy: No  CVA: No  Heart disease: No  Nephropathy: No  Peripheral neuropathy: No  Peripheral Vascular Disease: No  Retinopathy: No  Sexual dysfunction: No    Patient Problem List and Family Medical History reviewed for relevant medical history, current medical status, and diabetes risk factors.    Vitals:  Ht 1.753 m (5' 9\")   Wt 103.5 kg (228 lb 3.2 oz)   BMI 33.70 kg/m    Estimated body mass index is 33.7 kg/m  as calculated from the following:    Height as of this encounter: 1.753 m (5' 9\").    Weight as of this " encounter: 103.5 kg (228 lb 3.2 oz).   Last 3 BP:   BP Readings from Last 3 Encounters:   04/12/23 130/78   01/05/23 134/78   11/16/22 (!) 136/95       History   Smoking Status     Former     Packs/day: 1.00     Types: Cigarettes   Smokeless Tobacco     Never       Labs:  Lab Results   Component Value Date    A1C 11.8 04/12/2023     Lab Results   Component Value Date     04/12/2023     02/09/2021     Lab Results   Component Value Date     04/12/2023     Direct Measure HDL   Date Value Ref Range Status   04/12/2023 35 (L) >=40 mg/dL Final   ]  GFR Estimate   Date Value Ref Range Status   04/12/2023 >90 >60 mL/min/1.73m2 Final     Comment:     eGFR calculated using 2021 CKD-EPI equation.   09/22/2020 >60 >60 Final     Comment:     _  Unit of Measure:   ml/min/1.73m2       No results found for: GFRESTBLACK  Lab Results   Component Value Date    CR 0.59 04/12/2023     No results found for: MICROALBUMIN    Healthy Eating:  Healthy Eating Assessed Today: Yes  Cultural/Alevism diet restrictions?: Yes  Meal planning/habits: Carb counting  How many times a week on average do you eat food made away from home (restaurant/take-out)?: 2  Meals include: Breakfast, Lunch, Dinner, Evening Snack  Beverages: Water, Milk, Energy drinks    Being Active:  Being Active Assessed Today: Yes  Exercise:: Yes  Days per week of moderate to strenuous exercise (like a brisk walk): 5  On average, minutes per day of exercise at this level: 40  How intense was your typical exercise? : Moderate (like brisk walking)  Exercise Minutes per Week: 200  Barrier to exercise: None    Monitoring:  Monitoring Assessed Today: Yes  Did patient bring glucose meter to appointment? : Yes  Blood Glucose Meter: CGM  Times checking blood sugar at home (number): 5+  Times checking blood sugar at home (per): Day  Blood glucose trend: Fluctuating    Taking Medications:  Diabetes Medication(s)     Biguanides       metFORMIN (GLUCOPHAGE) 500 MG  tablet    Take 1 tablet (500 mg) by mouth daily (with breakfast)    Insulin       insulin glargine (LANTUS SOLOSTAR) 100 UNIT/ML pen    See Instructions, Instructions: 20 Subcutaneous daily, # 30 mL, 3 Refill(s), Type: Maintenance, Pharmacy: [id number], 20 Subcutaneous daily, 69, in, 03/04/21 14:16:00 CST, Height Measured, 231, lb, 03/04/21 14:16:00 CST, Weight Measured Strength: 100 UNIT/ML     insulin lispro (HUMALOG KWIKPEN) 100 UNIT/ML (1 unit dial) KWIKPEN    Taking up to 30 units a day divided between meals and snacks.     insulin lispro (HUMALOG VIAL) 100 UNIT/ML vial    Using 45 units via insulin pump daily.          Taking Medication Assessed Today: Yes  Current Treatments: Insulin Pump  Problems taking diabetes medications regularly?: Yes    Problem Solving:  Problem Solving Assessed Today: Yes  Is the patient at risk for hypoglycemia?: Yes  Hypoglycemia Treatment: Glucose (tablets or gel), Juice, Candy  Medical ID: No  Does patient have glucagon emergency kit?: Yes  Is the patient at risk for DKA?: Yes  Does patient have ketone test strips?: No  Does patient have DKA prevention plan?: Yes  Does patient have severe weather/disaster plan for diabetes management?: Yes  Does patient have sick day plan for diabetes management?: Yes              Reducing Risks:  Reducing Risks Assessed Today: Yes  CAD Risks: Diabetes Mellitus, Hypertension    Healthy Coping:  Informal Support system:: [place] based, Family, Friends  Patient Activation Measure Survey Score:       View : No data to display.                  Care Plan and Education Provided:  Care Plan: Diabetes   Updates made by Cielo Noble RD since 4/29/2023 12:00 AM      Problem: HbA1C Not In Goal       Goal: Establish Regular Follow-Ups with PCP       Task: Discuss with PCP the recommended timing for patient's next follow up visit(s)    Responsible User: Cielo Noble RD      Task: Discuss schedule for PCP visits with patient Completed  4/29/2023   Responsible User: Cielo Noble RD      Goal: Get HbA1C Level in Goal       Task: Educate patient on diabetes education self-management topics    Responsible User: Cielo Noble RD      Task: Educate patient on benefits of regular glucose monitoring    Responsible User: Cielo Noble RD      Task: Refer patient to appropriate extended care team member, as needed (Medication Therapy Management, Behavioral Health, Physical Therapy, etc.)    Responsible User: Cielo Noble RD      Task: Discuss diabetes treatment plan with patient Completed 4/29/2023   Responsible User: Cielo Noble RD      Problem: Diabetes Self-Management Education Needed to Optimize Self-Care Behaviors       Goal: Understand diabetes pathophysiology and disease progression       Task: Provide education on diabetes pathophysiology and disease progression specfic to patient's diabetes type    Responsible User: Cielo Noble RD      Goal: Healthy Eating - follow a healthy eating pattern for diabetes       Task: Provide education on portion control and consistency in amount, composition and timing of food intake    Responsible User: Cielo Noble RD      Task: Provide education on managing carbohydrate intake (carbohydrate counting, plate planning method, etc.)    Responsible User: Cielo Noble RD      Task: Provide education on weight management    Responsible User: Cielo Noble RD      Task: Provide education on heart healthy eating Completed 4/29/2023   Responsible User: Cielo Noble RD      Task: Provide education on eating out    Responsible User: Cielo Noble RD      Task: Develop individualized healthy eating plan with patient    Responsible User: Cielo Noble RD      Goal: Being Active - get regular physical activity, working up to at least 150 minutes per week       Task: Provide education on relationship of activity to glucose and precautions to take if at risk for low glucose Completed 4/29/2023    Responsible User: Cielo Noble RD      Task: Discuss barriers to physical activity with patient    Responsible User: Ceilo Noble RD      Task: Develop physical activity plan with patient    Responsible User: Cielo Noble RD      Task: Explore community resources including walking groups, assistance programs, and home videos    Responsible User: Cielo Noble RD      Goal: Monitoring - monitor glucose and ketones as directed    This Visit's Progress: 70%   Note:    Patient to use Dexcom sensor and BG testing as a backup     Task: Provide education on blood glucose monitoring (purpose, proper technique, frequency, glucose targets, interpreting results, when to use glucose control solution, sharps disposal)    Responsible User: Cielo Noble RD      Task: Provide education on continuous glucose monitoring (sensor placement, use of minoo or /reader, understanding glucose trends, alerts and alarms, differences between sensor glucose and blood glucose) Completed 4/29/2023   Responsible User: Cielo Noble RD      Task: Provide education on ketone monitoring (when to monitor, frequency, etc.)    Responsible User: Cielo Noble RD      Goal: Taking Medication - patient is consistently taking medications as directed       Task: Provide education on action of prescribed medication, including when to take and possible side effects    Responsible User: Cielo Noble RD      Task: Provide education on insulin and injectable diabetes medications, including administration, storage, site selection and rotation for injection sites    Responsible User: Cielo Noble RD      Task: Discuss barriers to medication adherence with patient and provide management technique ideas as appropriate    Responsible User: Cielo Noble RD      Task: Provide education on frequency and refill details of medications    Responsible User: Cielo Noble RD      Goal: Problem Solving - know how to prevent and manage  short-term diabetes complications       Task: Provide education on high blood glucose - causes, signs/symptoms, prevention and treatment    Responsible User: Cielo Noble RD      Task: Provide education on low blood glucose - causes, signs/symptoms, prevention, treatment, carrying a carbohydrate source at all times, and medical identification    Responsible User: Cielo Noble RD      Task: Provide education on safe travel with diabetes    Responsible User: Cielo Noble RD      Task: Provide education on how to care for diabetes on sick days    Responsible User: Cielo Noble RD      Task: Provide education on when to call a health care provider    Responsible User: Cielo Noble RD      Goal: Reducing Risks - know how to prevent and treat long-term diabetes complications       Task: Provide education on major complications of diabetes, prevention, early diagnostic measures and treatment of complications    Responsible User: Cielo Noble RD      Task: Provide education on recommended care for dental, eye and foot health    Responsible User: Cielo Noble RD      Task: Provide education on Hemoglobin A1c - goals and relationship to blood glucose levels    Responsible User: Cielo Noble RD      Task: Provide education on recommendations for heart health - lipid levels and goals, blood pressure and goals, and aspirin therapy, if indicated    Responsible User: Cielo Noble RD      Task: Provide education on tobacco cessation    Responsible User: Cielo Noble RD      Goal: Healthy Coping - use available resources to cope with the challenges of managing diabetes       Task: Discuss recognizing feelings about having diabetes    Responsible User: Cielo Noble RD      Task: Provide education on the benefits of making appropriate lifestyle changes    Responsible User: Cielo Noble RD      Task: Provide education on benefits of utilizing support systems    Responsible User: Cielo Noble RD       Task: Discuss methods for coping with stress    Responsible User: Cielo Noble RD      Task: Provide education on when to seek professional counseling    Responsible User: Cielo Noble RD            Time Spent: 30 minutes  Encounter Type: Individual    Any diabetes medication dose changes were made via the CDE Protocol per the patient's referring provider. A copy of this encounter was shared with the provider.

## 2023-05-20 ENCOUNTER — HEALTH MAINTENANCE LETTER (OUTPATIENT)
Age: 38
End: 2023-05-20

## 2023-08-12 ENCOUNTER — HEALTH MAINTENANCE LETTER (OUTPATIENT)
Age: 38
End: 2023-08-12

## 2023-09-22 ENCOUNTER — OFFICE VISIT (OUTPATIENT)
Dept: FAMILY MEDICINE | Facility: CLINIC | Age: 38
End: 2023-09-22
Payer: COMMERCIAL

## 2023-09-22 VITALS
HEART RATE: 74 BPM | OXYGEN SATURATION: 95 % | SYSTOLIC BLOOD PRESSURE: 126 MMHG | BODY MASS INDEX: 31.7 KG/M2 | HEIGHT: 69 IN | WEIGHT: 214 LBS | TEMPERATURE: 97.7 F | RESPIRATION RATE: 20 BRPM | DIASTOLIC BLOOD PRESSURE: 81 MMHG

## 2023-09-22 DIAGNOSIS — E78.1 PURE HYPERTRIGLYCERIDEMIA: ICD-10-CM

## 2023-09-22 DIAGNOSIS — I10 BENIGN ESSENTIAL HYPERTENSION: ICD-10-CM

## 2023-09-22 DIAGNOSIS — Z79.4 DIABETES MELLITUS DUE TO UNDERLYING CONDITION WITHOUT COMPLICATION, WITH LONG-TERM CURRENT USE OF INSULIN (H): Primary | ICD-10-CM

## 2023-09-22 DIAGNOSIS — E08.9 DIABETES MELLITUS DUE TO UNDERLYING CONDITION WITHOUT COMPLICATION, WITH LONG-TERM CURRENT USE OF INSULIN (H): Primary | ICD-10-CM

## 2023-09-22 LAB
ALBUMIN UR-MCNC: NEGATIVE MG/DL
APPEARANCE UR: CLEAR
BACTERIA #/AREA URNS HPF: NORMAL /HPF
BILIRUB UR QL STRIP: NEGATIVE
COLOR UR AUTO: YELLOW
CREAT UR-MCNC: 13.9 MG/DL
GLUCOSE UR STRIP-MCNC: >=1000 MG/DL
HBA1C MFR BLD: 13.8 % (ref 0–5.6)
HGB UR QL STRIP: ABNORMAL
KETONES UR STRIP-MCNC: ABNORMAL MG/DL
LEUKOCYTE ESTERASE UR QL STRIP: NEGATIVE
MICROALBUMIN UR-MCNC: <12 MG/L
MICROALBUMIN/CREAT UR: NORMAL MG/G{CREAT}
NITRATE UR QL: NEGATIVE
PH UR STRIP: 7 [PH] (ref 5–7)
RBC #/AREA URNS AUTO: NORMAL /HPF
SP GR UR STRIP: 1.01 (ref 1–1.03)
SQUAMOUS #/AREA URNS AUTO: NORMAL /LPF
UROBILINOGEN UR STRIP-ACNC: 0.2 E.U./DL
WBC #/AREA URNS AUTO: NORMAL /HPF

## 2023-09-22 PROCEDURE — 83036 HEMOGLOBIN GLYCOSYLATED A1C: CPT | Performed by: FAMILY MEDICINE

## 2023-09-22 PROCEDURE — 81001 URINALYSIS AUTO W/SCOPE: CPT | Performed by: FAMILY MEDICINE

## 2023-09-22 PROCEDURE — 99214 OFFICE O/P EST MOD 30 MIN: CPT | Performed by: FAMILY MEDICINE

## 2023-09-22 PROCEDURE — 82043 UR ALBUMIN QUANTITATIVE: CPT | Performed by: FAMILY MEDICINE

## 2023-09-22 PROCEDURE — 82570 ASSAY OF URINE CREATININE: CPT | Performed by: FAMILY MEDICINE

## 2023-09-22 PROCEDURE — 36415 COLL VENOUS BLD VENIPUNCTURE: CPT | Performed by: FAMILY MEDICINE

## 2023-09-22 ASSESSMENT — PAIN SCALES - GENERAL: PAINLEVEL: NO PAIN (0)

## 2023-09-22 NOTE — LETTER
September 22, 2023      Wolf Gibbs  [address]  [place] [facility] [address]        Dear ,    We are writing to inform you of your test results.    Test results indicate you may require additional follow up, see comment below.  Your A1c is up considerably.  Please set up a time with Kori Noble to discuss your treatment options.  Please follow-up with me in 4 to 6 weeks.  Resulted Orders   Hemoglobin A1c   Result Value Ref Range    Hemoglobin A1C 13.8 (H) 0.0 - 5.6 %   UA Macroscopic with reflex to Microscopic and Culture   Result Value Ref Range    Color Urine Yellow Colorless, Straw, Light Yellow, Yellow    Appearance Urine Clear Clear    Glucose Urine >=1000 (A) Negative mg/dL    Bilirubin Urine Negative Negative    Ketones Urine Trace (A) Negative mg/dL    Specific Gravity Urine 1.010 1.003 - 1.035    Blood Urine Trace (A) Negative    pH Urine 7.0 5.0 - 7.0    Protein Albumin Urine Negative Negative mg/dL    Urobilinogen Urine 0.2 0.2, 1.0 E.U./dL    Nitrite Urine Negative Negative    Leukocyte Esterase Urine Negative Negative   UA Microscopic with Reflex to Culture   Result Value Ref Range    Bacteria Urine None Seen None Seen /HPF    RBC Urine None Seen 0-2 /HPF /HPF    WBC Urine None Seen 0-5 /HPF /HPF    Squamous Epithelials Urine None Seen None Seen /LPF    Narrative    Urine Culture not indicated       If you have any questions or concerns, please call the clinic at the number listed above.       Sincerely,      Tavares Jarquin MD

## 2023-09-22 NOTE — PROGRESS NOTES
"  Assessment & Plan     Diabetes mellitus due to underlying condition without complication, with long-term current use of insulin (H)  Patient with diabetes certainly controlled been an issue he can get his pump now or supplies.  He seems to be doing a good job cutting his carbs but his blood sugars off the pump have been significantly higher.  We will get him back to diabetic education hopefully we can get back on the pump see what his A1c is today.  Continue with his Lantus and his Lexapro.  Also continue with metformin  - Albumin Random Urine Quantitative with Creat Ratio  - Hemoglobin A1c  - UA Macroscopic with reflex to Microscopic and Culture  He declines any immunization updates  Nicotine reviewed he is smoke-free  Hypertension under good control with losartan hydrochlorothiazide    Hyperlipidemia controlled with Crestor  BMI:   Estimated body mass index is 31.6 kg/m  as calculated from the following:    Height as of this encounter: 1.753 m (5' 9\").    Weight as of this encounter: 97.1 kg (214 lb).           Tavares Jarquin MD  [facility]   Wolf is a 38 year old, presenting for the following health issues: Overall patient doing well.  Continues to workout regularly.  No issues with low blood sugars.  Because of insurance he could no longer get his pump and supplies.  He has not been using the Lantus and lies prone struggling with control.  He did have a 1 week long episode of left CVA pain few weeks ago that no longer present.  He saw his eye doctor recently with no signs of retinopathy  Diabetes and Hypertension        9/22/2023     1:02 PM   Additional Questions   Roomed by Rhonda   Accompanied by n/a       History of Present Illness       Diabetes:   He presents for follow up of diabetes.  He is checking home blood glucose four or more times daily.   He checks blood glucose before meals.  Blood glucose is sometimes over 200 and never under 70. He is aware of " hypoglycemia symptoms including shakiness, dizziness and weakness.    He has no concerns regarding his diabetes at this time.   He is not experiencing numbness or burning in feet, excessive thirst, blurry vision, weight changes or redness, sores or blisters on feet. The patient has had a diabetic eye exam in the last 12 months. Eye exam performed on May 2023. [facility].        Hypertension: He presents for follow up of hypertension.  He does check blood pressure  regularly outside of the clinic. Outpatient blood pressures have not been over 140/90. He does not follow a low salt diet.     Reason for visit:  Diabetic    He eats 2-3 servings of fruits and vegetables daily.He consumes 0 sweetened beverage(s) daily.He exercises with enough effort to increase his heart rate 20 to 29 minutes per day.  He exercises with enough effort to increase his heart rate 5 days per week.   He is taking medications regularly.           Patient Active Problem List   Diagnosis    Benign essential hypertension    Obesity    Diabetes mellitus (H)    Pure hypertriglyceridemia    Family history of ischemic heart disease     Current Outpatient Medications   Medication    blood glucose (GLUCOSE METER TEST) test strip    insulin glargine (LANTUS SOLOSTAR) 100 UNIT/ML pen    insulin lispro (HUMALOG KWIKPEN) 100 UNIT/ML (1 unit dial) KWIKPEN    insulin pen needle (32G X 4 MM) 32G X 4 MM miscellaneous    losartan-hydrochlorothiazide (HYZAAR) 50-12.5 MG tablet    metFORMIN (GLUCOPHAGE) 500 MG tablet    rosuvastatin (CRESTOR) 20 MG tablet    blood glucose (NO BRAND SPECIFIED) test strip    UNABLE TO FIND     No current facility-administered medications for this visit.             Review of Systems   Constitutional, HEENT, cardiovascular, pulmonary, gi and gu systems are negative, except as otherwise noted.      Objective    /81 (BP Location: Right arm, Patient Position: Sitting)   Pulse 74   Temp  "97.7  F (36.5  C) (Tympanic)   Resp 20   Ht 1.753 m (5' 9\")   Wt 97.1 kg (214 lb)   SpO2 95%   BMI 31.60 kg/m    Body mass index is 31.6 kg/m .  Physical Exam   GENERAL: healthy, alert and no distress  NECK: no adenopathy, no asymmetry, masses, or scars and thyroid normal to palpation  RESP: lungs clear to auscultation - no rales, rhonchi or wheezes  CV: regular rate and rhythm, normal S1 S2, no S3 or S4, no murmur, click or rub, no peripheral edema and peripheral pulses strong  ABDOMEN: soft, nontender, no hepatosplenomegaly, no masses and bowel sounds normal  MS: no gross musculoskeletal defects noted, no edema    Office Visit on 04/12/2023   Component Date Value Ref Range Status    Hemoglobin A1C 04/12/2023 11.8 (H)  0.0 - 5.6 % Final    Cholesterol 04/12/2023 201 (H)  <200 mg/dL Final    Triglycerides 04/12/2023 177 (H)  <150 mg/dL Final    Direct Measure HDL 04/12/2023 35 (L)  >=40 mg/dL Final    LDL Cholesterol Calculated 04/12/2023 131 (H)  <=100 mg/dL Final    Non HDL Cholesterol 04/12/2023 166 (H)  <130 mg/dL Final    Sodium 04/12/2023 132 (L)  136 - 145 mmol/L Final    Potassium 04/12/2023 4.5  3.4 - 5.3 mmol/L Final    Chloride 04/12/2023 96 (L)  98 - 107 mmol/L Final    Carbon Dioxide (CO2) 04/12/2023 24  22 - 29 mmol/L Final    Anion Gap 04/12/2023 12  7 - 15 mmol/L Final    Urea Nitrogen 04/12/2023 16.1  6.0 - 20.0 mg/dL Final    Creatinine 04/12/2023 0.59 (L)  0.67 - 1.17 mg/dL Final    Calcium 04/12/2023 9.5  8.6 - 10.0 mg/dL Final    Glucose 04/12/2023 388 (H)  70 - 99 mg/dL Final    GFR Estimate 04/12/2023 >90  >60 mL/min/1.73m2 Final    eGFR calculated using 2021 CKD-EPI equation.                     "

## 2023-11-27 DIAGNOSIS — Z79.4 DIABETES MELLITUS DUE TO UNDERLYING CONDITION WITHOUT COMPLICATION, WITH LONG-TERM CURRENT USE OF INSULIN (H): ICD-10-CM

## 2023-11-27 DIAGNOSIS — E08.9 DIABETES MELLITUS DUE TO UNDERLYING CONDITION WITHOUT COMPLICATION, WITH LONG-TERM CURRENT USE OF INSULIN (H): ICD-10-CM

## 2023-11-27 RX ORDER — INSULIN LISPRO 100 [IU]/ML
INJECTION, SOLUTION INTRAVENOUS; SUBCUTANEOUS
Qty: 30 ML | Refills: 1 | Status: SHIPPED | OUTPATIENT
Start: 2023-11-27 | End: 2024-04-04

## 2023-11-27 NOTE — TELEPHONE ENCOUNTER
Medication Question or Refill    What medication are you calling about (include dose and sig)?: Insulin lispro Humalog Kwikpen 100 Unit     Preferred Pharmacy:  [facility], [place], [place] - [place] - [address]  [address]  [facility] [address]  Phone: [phone] Fax: [phone]      Controlled Substance Agreement on file:   CSA -- Patient Level:    CSA: None found at the patient level.       Who prescribed the medication?: Dr. Jarquin    Do you need a refill? Yes    Do you have any questions or concerns?  No      Could we send this information to you in [facility] or would you prefer to receive a phone call?:   Patient would prefer a phone call   Okay to leave a detailed message?: Yes at Cell number on file:    Telephone Information:   Mobile [phone]

## 2023-12-30 ENCOUNTER — HEALTH MAINTENANCE LETTER (OUTPATIENT)
Age: 38
End: 2023-12-30

## 2024-04-01 ENCOUNTER — TRANSFERRED RECORDS (OUTPATIENT)
Dept: MULTI SPECIALTY CLINIC | Facility: CLINIC | Age: 39
End: 2024-04-01

## 2024-04-01 LAB — RETINOPATHY: NORMAL

## 2024-04-04 DIAGNOSIS — Z79.4 DIABETES MELLITUS DUE TO UNDERLYING CONDITION WITHOUT COMPLICATION, WITH LONG-TERM CURRENT USE OF INSULIN (H): ICD-10-CM

## 2024-04-04 DIAGNOSIS — E08.9 DIABETES MELLITUS DUE TO UNDERLYING CONDITION WITHOUT COMPLICATION, WITH LONG-TERM CURRENT USE OF INSULIN (H): ICD-10-CM

## 2024-04-05 RX ORDER — INSULIN LISPRO 100 [IU]/ML
INJECTION, SOLUTION INTRAVENOUS; SUBCUTANEOUS
Qty: 30 ML | Refills: 1 | Status: SHIPPED | OUTPATIENT
Start: 2024-04-05 | End: 2024-05-29

## 2024-04-16 ENCOUNTER — OFFICE VISIT (OUTPATIENT)
Dept: FAMILY MEDICINE | Facility: CLINIC | Age: 39
End: 2024-04-16
Payer: COMMERCIAL

## 2024-04-16 VITALS
HEART RATE: 107 BPM | SYSTOLIC BLOOD PRESSURE: 122 MMHG | WEIGHT: 220 LBS | OXYGEN SATURATION: 98 % | HEIGHT: 69 IN | RESPIRATION RATE: 16 BRPM | TEMPERATURE: 98.1 F | DIASTOLIC BLOOD PRESSURE: 84 MMHG | BODY MASS INDEX: 32.58 KG/M2

## 2024-04-16 DIAGNOSIS — E08.9 DIABETES MELLITUS DUE TO UNDERLYING CONDITION WITHOUT COMPLICATION, WITH LONG-TERM CURRENT USE OF INSULIN (H): ICD-10-CM

## 2024-04-16 DIAGNOSIS — Z11.59 NEED FOR HEPATITIS C SCREENING TEST: ICD-10-CM

## 2024-04-16 DIAGNOSIS — E78.1 HIGH TRIGLYCERIDES: ICD-10-CM

## 2024-04-16 DIAGNOSIS — E13.9 LADA (LATENT AUTOIMMUNE DIABETES IN ADULTS), MANAGED AS TYPE 1 (H): Primary | ICD-10-CM

## 2024-04-16 DIAGNOSIS — Z11.4 SCREENING FOR HIV (HUMAN IMMUNODEFICIENCY VIRUS): ICD-10-CM

## 2024-04-16 DIAGNOSIS — E78.1 PURE HYPERTRIGLYCERIDEMIA: ICD-10-CM

## 2024-04-16 DIAGNOSIS — I10 BENIGN ESSENTIAL HYPERTENSION: ICD-10-CM

## 2024-04-16 DIAGNOSIS — Z79.4 DIABETES MELLITUS DUE TO UNDERLYING CONDITION WITHOUT COMPLICATION, WITH LONG-TERM CURRENT USE OF INSULIN (H): ICD-10-CM

## 2024-04-16 LAB
ERYTHROCYTE [DISTWIDTH] IN BLOOD BY AUTOMATED COUNT: 12.2 % (ref 10–15)
HBA1C MFR BLD: 11.3 % (ref 0–5.6)
HCT VFR BLD AUTO: 46.3 % (ref 40–53)
HGB BLD-MCNC: 17.1 G/DL (ref 13.3–17.7)
MCH RBC QN AUTO: 30.5 PG (ref 26.5–33)
MCHC RBC AUTO-ENTMCNC: 36.9 G/DL (ref 31.5–36.5)
MCV RBC AUTO: 83 FL (ref 78–100)
PLATELET # BLD AUTO: 195 10E3/UL (ref 150–450)
RBC # BLD AUTO: 5.61 10E6/UL (ref 4.4–5.9)
WBC # BLD AUTO: 10.3 10E3/UL (ref 4–11)

## 2024-04-16 PROCEDURE — 99214 OFFICE O/P EST MOD 30 MIN: CPT | Performed by: NURSE PRACTITIONER

## 2024-04-16 PROCEDURE — 86803 HEPATITIS C AB TEST: CPT | Performed by: NURSE PRACTITIONER

## 2024-04-16 PROCEDURE — 85027 COMPLETE CBC AUTOMATED: CPT | Performed by: NURSE PRACTITIONER

## 2024-04-16 PROCEDURE — 80053 COMPREHEN METABOLIC PANEL: CPT | Performed by: NURSE PRACTITIONER

## 2024-04-16 PROCEDURE — 82607 VITAMIN B-12: CPT | Performed by: NURSE PRACTITIONER

## 2024-04-16 PROCEDURE — 36415 COLL VENOUS BLD VENIPUNCTURE: CPT | Performed by: NURSE PRACTITIONER

## 2024-04-16 PROCEDURE — 82043 UR ALBUMIN QUANTITATIVE: CPT | Performed by: NURSE PRACTITIONER

## 2024-04-16 PROCEDURE — 82570 ASSAY OF URINE CREATININE: CPT | Performed by: NURSE PRACTITIONER

## 2024-04-16 PROCEDURE — 83036 HEMOGLOBIN GLYCOSYLATED A1C: CPT | Performed by: NURSE PRACTITIONER

## 2024-04-16 PROCEDURE — 87389 HIV-1 AG W/HIV-1&-2 AB AG IA: CPT | Performed by: NURSE PRACTITIONER

## 2024-04-16 PROCEDURE — 80061 LIPID PANEL: CPT | Performed by: NURSE PRACTITIONER

## 2024-04-16 PROCEDURE — 83721 ASSAY OF BLOOD LIPOPROTEIN: CPT | Mod: 59 | Performed by: NURSE PRACTITIONER

## 2024-04-16 RX ORDER — ROSUVASTATIN CALCIUM 20 MG/1
20 TABLET, COATED ORAL DAILY
Qty: 90 TABLET | Refills: 1 | Status: SHIPPED | OUTPATIENT
Start: 2024-04-16 | End: 2024-09-03

## 2024-04-16 RX ORDER — LOSARTAN POTASSIUM AND HYDROCHLOROTHIAZIDE 12.5; 5 MG/1; MG/1
1 TABLET ORAL DAILY
Qty: 90 TABLET | Refills: 3 | Status: SHIPPED | OUTPATIENT
Start: 2024-04-16

## 2024-04-16 ASSESSMENT — PAIN SCALES - GENERAL: PAINLEVEL: NO PAIN (0)

## 2024-04-16 NOTE — LETTER
April 16, 2024      Wolf Gibbs  [address]  [place] [address]        To Whom It May Concern:    Wolf Gibbs  was seen in clinic on 4/16/24.           Sincerely,        Carlyn Kwon, CNP

## 2024-04-16 NOTE — PROGRESS NOTES
Stewart Bloom is a 38 year old, presenting for the following health issues:  Establish Care and Diabetes      4/16/2024     2:56 PM   Additional Questions   Roomed by nikole cardoza cma     History of Present Illness       Diabetes:   He presents for follow up of diabetes.  He is checking home blood glucose four or more times daily.   He checks blood glucose before meals.  Blood glucose is sometimes over 200 and never under 70. He is aware of hypoglycemia symptoms including shakiness and weakness.   He is concerned about blood sugar frequently over 200.    He is not experiencing numbness or burning in feet, excessive thirst, blurry vision, weight changes or redness, sores or blisters on feet. The patient has had a diabetic eye exam in the last 12 months. Eye exam performed on 4/17/24. [facility].        He eats 2-3 servings of fruits and vegetables daily.He consumes 0 sweetened beverage(s) daily.He exercises with enough effort to increase his heart rate 30 to 60 minutes per day.  He exercises with enough effort to increase his heart rate 5 days per week. He is missing 2 dose(s) of medications per week.  He is not taking prescribed medications regularly due to remembering to take.       Diabetes Follow-up    How often are you checking your blood sugar? Three-four times daily  Blood sugar testing frequency justification:  Uncontrolled diabetes  What time of day are you checking your blood sugars (select all that apply)?  Before and after meals  Have you had any blood sugars above 200?  Yes running 200-300  Have you had any blood sugars below 70?  No  What symptoms do you notice when your blood sugar is low?  Mihaela  What concerns do you have today about your diabetes? Blood sugar is often over 200   Do you have any of these symptoms? (Select all that apply)  No numbness or tingling in feet.  No redness, sores or blisters on feet.  No complaints of excessive thirst.  No reports  "of blurry vision.  No significant changes to weight.  Have you had a diabetic eye exam in the last 12 months? No- has scheduled for tomorrow at [facility] in [place], [place]            Hyperlipidemia Follow-Up    Are you regularly taking any medication or supplement to lower your cholesterol?     Are you having muscle aches or other side effects that you think could be caused by your cholesterol lowering medication?  No    Hypertension Follow-up    Do you check your blood pressure regularly outside of the clinic? No   Are you following a low salt diet? No  Are your blood pressures ever more than 140 on the top number (systolic) OR more   than 90 on the bottom number (diastolic), for example 140/90? No    BP Readings from Last 2 Encounters:   04/16/24 122/84   09/22/23 126/81     Hemoglobin A1C (%)   Date Value   04/16/2024 11.3 (H)   09/22/2023 13.8 (H)     LDL Cholesterol Calculated   Date Value   04/12/2023 131 mg/dL (H)   11/08/2022      Comment:     Cannot estimate LDL when triglyceride exceeds 400 mg/dL     LDL Cholesterol Direct (mg/dL)   Date Value   11/08/2022 55     Review of Systems  Constitutional, neuro, ENT, endocrine, pulmonary, cardiac, gastrointestinal, genitourinary, musculoskeletal, integument and psychiatric systems are negative, except as otherwise noted.      Objective    /84   Pulse 107   Temp 98.1  F (36.7  C)   Resp 16   Ht 1.753 m (5' 9\")   Wt 99.8 kg (220 lb)   SpO2 98%   BMI 32.49 kg/m    Body mass index is 32.49 kg/m .  Physical Exam   GENERAL: alert and no distress  RESP: lungs clear to auscultation - no rales, rhonchi or wheezes  CV: regular rate and rhythm, normal S1 S2, no S3 or S4, no murmur, click or rub, no peripheral edema   ABDOMEN: soft, nontender, no hepatosplenomegaly, no masses and bowel sounds normal  MS: no gross musculoskeletal defects noted, no edema  NEURO: Normal strength and tone, mentation intact and speech normal  PSYCH: mentation appears normal, affect " normal/bright  Diabetic foot exam: normal DP and PT pulses, no trophic changes or ulcerative lesions, and normal sensory exam    Results for orders placed or performed in visit on 04/16/24 (from the past 24 hour(s))   Hemoglobin A1c   Result Value Ref Range    Hemoglobin A1C 11.3 (H) 0.0 - 5.6 %         A/P:  1. 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 Bili, TP); Future  - Vitamin B12; Future  - Albumin Random Urine Quantitative with Creat Ratio; Future  - Adult Eye  Referral; Future  - Adult Diabetes Education  Referral; Future  - Adult Endocrinology  Referral; Future  - blood glucose (NO BRAND SPECIFIED) test strip; Use to test blood sugar 4 times daily or as directed.  Dispense: 200 strip; Refill: 2  - Hemoglobin A1c  - Comprehensive metabolic panel (BMP + Alb, Alk Phos, ALT, AST, Total. Bili, TP)  - Vitamin B12  - Albumin Random Urine Quantitative with Creat Ratio    2. Benign essential hypertension  Stable  - CBC with platelets; Future  - losartan-hydrochlorothiazide (HYZAAR) 50-12.5 MG tablet; Take 1 tablet by mouth daily  Dispense: 90 tablet; Refill: 3  - CBC with platelets    3. Pure hypertriglyceridemia  Stable  - Lipid panel reflex to direct LDL Non-fasting; Future  - rosuvastatin (CRESTOR) 20 MG tablet; Take 1 tablet (20 mg) by mouth daily  Dispense: 90 tablet; Refill: 1  - Lipid panel reflex to direct LDL Non-fasting    4. Screening for HIV (human immunodeficiency virus)  - HIV Antigen Antibody Combo; Future  - HIV Antigen Antibody Combo    5. Need for hepatitis C screening test  - Hepatitis C Screen Reflex to HCV RNA Quant and Genotype; Future  - Hepatitis C Screen Reflex to HCV RNA Quant and Genotype    6. Diabetes mellitus due to underlying condition without complication, with long-term current use of insulin (H)  -  losartan-hydrochlorothiazide (HYZAAR) 50-12.5 MG tablet; Take 1 tablet by mouth daily  Dispense: 90 tablet; Refill: 3  - metFORMIN (GLUCOPHAGE) 500 MG tablet; Take 1 tablet (500 mg) by mouth daily (with breakfast)  Dispense: 90 tablet; Refill: 3    Signed Electronically by: Carlyn Kwon CNP

## 2024-04-17 LAB
ALBUMIN SERPL BCG-MCNC: 4.9 G/DL (ref 3.5–5.2)
ALP SERPL-CCNC: 81 U/L (ref 40–150)
ALT SERPL W P-5'-P-CCNC: 27 U/L (ref 0–70)
ANION GAP SERPL CALCULATED.3IONS-SCNC: 16 MMOL/L (ref 7–15)
AST SERPL W P-5'-P-CCNC: 18 U/L (ref 0–45)
BILIRUB SERPL-MCNC: 1.2 MG/DL
BUN SERPL-MCNC: 14.3 MG/DL (ref 6–20)
CALCIUM SERPL-MCNC: 9.7 MG/DL (ref 8.6–10)
CHLORIDE SERPL-SCNC: 97 MMOL/L (ref 98–107)
CHOLEST SERPL-MCNC: 231 MG/DL
CREAT SERPL-MCNC: 0.55 MG/DL (ref 0.67–1.17)
CREAT UR-MCNC: 88.3 MG/DL
DEPRECATED HCO3 PLAS-SCNC: 22 MMOL/L (ref 22–29)
EGFRCR SERPLBLD CKD-EPI 2021: >90 ML/MIN/1.73M2
FASTING STATUS PATIENT QL REPORTED: ABNORMAL
GLUCOSE SERPL-MCNC: 156 MG/DL (ref 70–99)
HCV AB SERPL QL IA: NONREACTIVE
HDLC SERPL-MCNC: 27 MG/DL
HIV 1+2 AB+HIV1 P24 AG SERPL QL IA: NONREACTIVE
LDLC SERPL CALC-MCNC: ABNORMAL MG/DL
LDLC SERPL DIRECT ASSAY-MCNC: 49 MG/DL
MICROALBUMIN UR-MCNC: 120 MG/L
MICROALBUMIN/CREAT UR: 135.9 MG/G CR (ref 0–17)
NONHDLC SERPL-MCNC: 204 MG/DL
POTASSIUM SERPL-SCNC: 3.7 MMOL/L (ref 3.4–5.3)
PROT SERPL-MCNC: 7.5 G/DL (ref 6.4–8.3)
SODIUM SERPL-SCNC: 135 MMOL/L (ref 135–145)
TRIGL SERPL-MCNC: 1159 MG/DL
VIT B12 SERPL-MCNC: 994 PG/ML (ref 232–1245)

## 2024-04-18 ENCOUNTER — APPOINTMENT (OUTPATIENT)
Dept: GENERAL RADIOLOGY | Facility: CLINIC | Age: 39
End: 2024-04-18
Attending: EMERGENCY MEDICINE
Payer: COMMERCIAL

## 2024-04-18 ENCOUNTER — HOSPITAL ENCOUNTER (EMERGENCY)
Facility: CLINIC | Age: 39
Discharge: HOME | End: 2024-04-18
Attending: EMERGENCY MEDICINE | Admitting: EMERGENCY MEDICINE
Payer: COMMERCIAL

## 2024-04-18 ENCOUNTER — TELEPHONE (OUTPATIENT)
Dept: ENDOCRINOLOGY | Facility: CLINIC | Age: 39
End: 2024-04-18
Payer: COMMERCIAL

## 2024-04-18 VITALS
DIASTOLIC BLOOD PRESSURE: 97 MMHG | WEIGHT: 230.6 LBS | TEMPERATURE: 97.7 F | RESPIRATION RATE: 20 BRPM | HEIGHT: 69 IN | BODY MASS INDEX: 34.16 KG/M2 | HEART RATE: 99 BPM | OXYGEN SATURATION: 94 % | SYSTOLIC BLOOD PRESSURE: 144 MMHG

## 2024-04-18 DIAGNOSIS — S20.211A RIB CONTUSION, RIGHT, INITIAL ENCOUNTER: ICD-10-CM

## 2024-04-18 PROCEDURE — 71101 X-RAY EXAM UNILAT RIBS/CHEST: CPT | Mod: RT

## 2024-04-18 PROCEDURE — 99283 EMERGENCY DEPT VISIT LOW MDM: CPT

## 2024-04-18 ASSESSMENT — COLUMBIA-SUICIDE SEVERITY RATING SCALE - C-SSRS
6. HAVE YOU EVER DONE ANYTHING, STARTED TO DO ANYTHING, OR PREPARED TO DO ANYTHING TO END YOUR LIFE?: NO
2. HAVE YOU ACTUALLY HAD ANY THOUGHTS OF KILLING YOURSELF IN THE PAST MONTH?: NO
1. IN THE PAST MONTH, HAVE YOU WISHED YOU WERE DEAD OR WISHED YOU COULD GO TO SLEEP AND NOT WAKE UP?: NO

## 2024-04-18 ASSESSMENT — ACTIVITIES OF DAILY LIVING (ADL): ADLS_ACUITY_SCORE: 33

## 2024-04-18 NOTE — TELEPHONE ENCOUNTER
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

## 2024-04-19 NOTE — ED PROVIDER NOTES
"  History     Chief Complaint:  Fall       The history is provided by the patient.      Wolf Gibbs is a 38 year old male with history of diabetes mellitus 1 and hypertension who presents to the ED with chest wall pain following a fall. He explains that earlier this evening while [place] the jobsite he fell while walking, landing on his right side. He did not hit his head. He presents endorsing right chest wall pain and pain under his right arm. He has not yet taken anything for these symptoms.     Independent Historian:   None - Patient Only    Review of External Notes:       Medications:    Glucose  Lantus solostar  Humalog Kwikpen  Hyzaar  Glucophage  Crestor    Past Medical History:    Diabetes mellitus 1  Tobacco use disorder  Hypertension  Hypertriglyceridemia    Past Surgical History:    Past Surgical History:   Procedure Laterality Date    NO PAST SURGERIES          Physical Exam   Patient Vitals for the past 24 hrs:   BP Temp Pulse Resp SpO2 Height Weight   04/18/24 1938 (!) 144/97 97.7  F (36.5  C) 99 20 94 % 1.753 m (5' 9\") 104.6 kg (230 lb 9.6 oz)        Physical Exam  Vitals and nursing note reviewed.   HENT:      Mouth/Throat:      Mouth: Mucous membranes are moist.   Cardiovascular:      Rate and Rhythm: Normal rate and regular rhythm.      Comments: Chest wall: Patient is tender over the midclavicular to mid axillary line no crepitus no ecchymosis no step-off or deformity.  Pulmonary:      Effort: Pulmonary effort is normal.      Breath sounds: Normal breath sounds.   Musculoskeletal:         General: Normal range of motion.   Skin:     Capillary Refill: Capillary refill takes less than 2 seconds.   Neurological:      Mental Status: He is alert.         Emergency Department Course   Imaging:  Ribs XR, unilat 3 views + PA chest, right   Final Result   IMPRESSION: The visualized heart and lungs are negative. No rib fractures.         Laboratory:  Labs Ordered and Resulted from Time of ED Arrival to Time " of ED Departure - No data to display     Procedures   None    Emergency Department Course & Assessments:    Interventions:  Medications - No data to display     Independent Interpretation (X-rays, CTs, rhythm strip):  Rib Xray; no breaks in two dimensional imaging.     Consultations/Discussion of Management or Tests:   ED Course as of 04/19/24 0249   Thu Apr 18, 2024 1940 I obtained history and examined the patient as noted above.   2020 I rechecked the patient and explained findings.     Social Determinants of Health affecting care:   None    Disposition:  The patient was discharged.     Impression & Plan    CMS Diagnoses: None       Medical Decision Making:  Patient presents with right rib cage pain after fall.  Due to work-related injury as well as pain with respiration and motion x-rays confirm no fracture.  No pneumothorax.  No concerns for abdominal trauma.  Patient offered reassurance and discharged home.      Diagnosis:    ICD-10-CM    1. Rib contusion, right, initial encounter  S20.211A            Discharge Medications:  Discharge Medication List as of 4/18/2024  8:28 PM         Scribe Disclosure:  I, TERE MAX, am serving as a scribe at 7:40 PM on 4/18/2024 to document services personally performed by Medardo Dolan MD based on my observations and the provider's statements to me.     4/18/2024   Medardo Dolan MD Goodman, Brian Samuel, MD  04/19/24 0249

## 2024-04-19 NOTE — ED TRIAGE NOTES
Fell at work 2h PTA. Fell onto right arm under his chest. Now having right sided chest pain and SOB. Denies hitting head or LOC.

## 2024-04-19 NOTE — DISCHARGE INSTRUCTIONS
Your x-rays show no clear rib break or lung injury.  Okay to use ice or heat okay to use ibuprofen or Tylenol return to the emergency room with severe increase in shortness of breath or other concerns.  Thanks for your patience tonight.

## 2024-05-02 ENCOUNTER — OFFICE VISIT (OUTPATIENT)
Dept: CARDIOLOGY | Facility: CLINIC | Age: 39
End: 2024-05-02
Attending: NURSE PRACTITIONER
Payer: COMMERCIAL

## 2024-05-02 VITALS
DIASTOLIC BLOOD PRESSURE: 81 MMHG | SYSTOLIC BLOOD PRESSURE: 133 MMHG | HEART RATE: 88 BPM | BODY MASS INDEX: 32.49 KG/M2 | WEIGHT: 220 LBS | RESPIRATION RATE: 16 BRPM | OXYGEN SATURATION: 97 %

## 2024-05-02 DIAGNOSIS — E78.1 HIGH TRIGLYCERIDES: ICD-10-CM

## 2024-05-02 DIAGNOSIS — E78.1 PURE HYPERTRIGLYCERIDEMIA: ICD-10-CM

## 2024-05-02 PROCEDURE — 99204 OFFICE O/P NEW MOD 45 MIN: CPT | Performed by: INTERNAL MEDICINE

## 2024-05-02 RX ORDER — FENOFIBRATE 48 MG/1
48 TABLET, COATED ORAL DAILY
Qty: 90 TABLET | Refills: 3 | Status: SHIPPED | OUTPATIENT
Start: 2024-05-02

## 2024-05-02 NOTE — PATIENT INSTRUCTIONS
Wolf JOSIE Sai,     It was a pleasure to see you in the office today. My recommendations for you include:   1. Add fenofibrate to lower triglycerides  2. Repeat fasting lipids in 1-2 months  3. Echocardiogram   4. Consider a CT coronary calcium scan     Please do not hesitate to call the [facility] clinic with any questions or concerns at [phone].    Sincerely,     Tisha Florian MD

## 2024-05-02 NOTE — LETTER
5/2/2024    Tavares Jarquin MD  [address] [address]    RE: Wolf Gibbs       Dear Colleague,     I had the pleasure of seeing Wolf Gibbs in the [facility].    HEART CARE ENCOUNTER CONSULTATON NOTE      [facility]  [phone]      Assessment/Recommendations   Assessment:  1.  Dyslipidemia: Hypertriglyceridemia likely secondary to poorly controlled diabetes.  Given extreme elevation in triglycerides will recommend adding a fibrate for improved control.  Continue on statin therapy.  Consider CT coronary calcium screen for further cardiac risk assessment.  2.  Left ventricular hypertrophy on echocardiogram from 2017  3.  Diabetes mellitus type 1 poorly controlled  4.  History of substance abuse in remission for 3 years  5.  Obesity      Plan:  1.  Add fenofibrate 48 mg daily, continue low-carb diet and work on better diabetes management  2.  Agree with endocrine consultation for better diabetes management  3.  Continue heart healthy diabetic diet and regular exercise, weight loss  4.  Echocardiogram  Follow-up in 1 year       History of Present Illness/Subjective    HPI: Wolf Gibbs is a 38 year old male with history of hypertension, poorly controlled diabetes mellitus type 1, dyslipidemia who I am seeing today for initial consultation for lipid management.  He exercises regularly.  He does a mix of cardio and weightlifting and denies any exertional symptoms.  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  His blood pressure is now better controlled on current medical therapy than it used to be.  He has been having trouble controlling his diabetes.  It had been working better on his insulin pump however due to cost he has had trouble with this recently.  Last A1c was 11%.  Triglycerides over 1000 with HDL 27 and LDL of 49.  He tries to eat healthy.  He eats fairly healthy throughout the week and uses Sundays as his cheat  days.  He eats a fairly low-carb diet.       Physical Examination  Review of Systems   Vitals: /81 (BP Location: Left arm, Patient Position: Sitting, Cuff Size: Adult Large)   Pulse 88   Resp 16   Wt 99.8 kg (220 lb)   SpO2 97%   BMI 32.49 kg/m    BMI= Body mass index is 32.49 kg/m .  Wt Readings from Last 3 Encounters:   05/02/24 99.8 kg (220 lb)   04/18/24 104.6 kg (230 lb 9.6 oz)   04/16/24 99.8 kg (220 lb)       General Appearance:   no distress, normal body habitus   ENT/Mouth: membranes moist, no oral lesions or bleeding gums.      EYES:  no scleral icterus, normal conjunctivae   Neck: no carotid bruits or thyromegaly   Chest/Lungs:   lungs are clear to auscultation   Cardiovascular:   Regular. Normal first and second heart sounds with no murmur no edema bilaterally    Abdomen:  no organomegaly, masses, bruits, or tenderness; bowel sounds are present   Extremities: no cyanosis or clubbing   Skin: no xanthelasma, warm.    Neurologic: normal  bilateral, no tremors     Psychiatric: alert and oriented x3, calm        Please refer above for cardiac ROS details.        Medical History  Surgical History Family History Social History   Past Medical History:   Diagnosis Date    Tobacco use disorder 11/13/2010     Past Surgical History:   Procedure Laterality Date    NO PAST SURGERIES       Family History   Problem Relation Age of Onset    Hypertension Mother     Hyperthyroidism Mother     Alcoholism Father     Depression Father     Diabetes Type 1 Sister     Down Syndrome Sister     No Known Problems Brother     Myocardial Infarction Paternal Grandfather 48        Social History     Socioeconomic History    Marital status: Single     Spouse name: Not on file    Number of children: Not on file    Years of education: Not on file    Highest education level: Not on file   Occupational History    Not on file   Tobacco Use    Smoking status: Former     Current packs/day: 1.00     Types: Cigarettes    Smokeless  tobacco: Never   Vaping Use    Vaping status: Former    Substances: Nicotine   Substance and Sexual Activity    Alcohol use: Not Currently    Drug use: Not on file    Sexual activity: Not on file   Other Topics Concern    Not on file   Social History Narrative    Not on file     Social Determinants of Health     Financial Resource Strain: Low Risk  (9/22/2023)    Financial Resource Strain     Within the past 12 months, have you or your family members you live with been unable to get utilities (heat, electricity) when it was really needed?: No   Food Insecurity: Low Risk  (9/22/2023)    Food Insecurity     Within the past 12 months, did you worry that your food would run out before you got money to buy more?: No     Within the past 12 months, did the food you bought just not last and you didn t have money to get more?: No   Transportation Needs: Low Risk  (9/22/2023)    Transportation Needs     Within the past 12 months, has lack of transportation kept you from medical appointments, getting your medicines, non-medical meetings or appointments, work, or from getting things that you need?: No   Physical Activity: Not on file   Stress: Not on file   Social Connections: Unknown (1/1/2022)    [facility] from [facility], [facility]    Social Connections     Frequency of Communication with Friends and Family: Not on file   Interpersonal Safety: Low Risk  (4/16/2024)    Interpersonal Safety     Do you feel physically and emotionally safe where you currently live?: Yes     Within the past 12 months, have you been hit, slapped, kicked or otherwise physically hurt by someone?: No     Within the past 12 months, have you been humiliated or emotionally abused in other ways by your partner or ex-partner?: No   Housing Stability: Low Risk  (9/22/2023)    Housing Stability     Do you have housing? : Yes     Are you worried about losing your housing?: No            Medications  Allergies   Current Outpatient Medications   Medication Sig Dispense Refill    blood glucose (GLUCOSE METER TEST) test strip testing 4-6x/ day 100 strip 11    blood glucose (NO BRAND SPECIFIED) test strip Use to test blood sugar 4 times daily or as directed. 200 strip 2    blood glucose (NO BRAND SPECIFIED) test strip Instructions: testing 4-6x/ day 100 strip 11    insulin glargine (LANTUS SOLOSTAR) 100 UNIT/ML pen See Instructions, Instructions: 20 Subcutaneous daily, # 30 mL, 3 Refill(s), Type: Maintenance, Pharmacy: [id number], 20 Subcutaneous daily, 69, in, 03/04/21 14:16:00 CST, Height Measured, 231, lb, 03/04/21 14:16:00 CST, Weight Measured Strength: 100 UNIT/ML 30 mL 1    insulin lispro (HUMALOG KWIKPEN) 100 UNIT/ML (1 unit dial) KWIKPEN Taking up to 30 units a day divided between meals and snacks. 30 mL 1    insulin pen needle (32G X 4 MM) 32G X 4 MM miscellaneous injecting 4-6x/ day 100 each 11    losartan-hydrochlorothiazide (HYZAAR) 50-12.5 MG tablet Take 1 tablet by mouth daily 90 tablet 3    metFORMIN (GLUCOPHAGE) 500 MG tablet Take 1 tablet (500 mg) by mouth daily (with breakfast) 90 tablet 3    rosuvastatin (CRESTOR) 20 MG tablet Take 1 tablet (20 mg) by mouth daily 90 tablet 1    UNABLE TO FIND  (Patient not taking: Reported on 4/16/2024)       No Known Allergies       Lab Results    Chemistry/lipid CBC Cardiac Enzymes/BNP/TSH/INR   Recent Labs   Lab Test 04/16/24  1538 11/08/22  0949 09/25/20  1526   CHOL 231*   < > 196   HDL 27*   < > 25*   LDL 49   < > See comment   TRIG 1,159*   < > 985*   CHOLHDLRATIO  --   --  7.8*    < > = values in this interval not displayed.     Recent Labs   Lab Test 04/16/24  1538 04/12/23  1131 11/08/22  0949   LDL 49 131* 55     Recent Labs   Lab Test 04/16/24  1538      POTASSIUM 3.7   CHLORIDE 97*   CO2 22   *   BUN 14.3   CR 0.55*   GFRESTIMATED >90   HUMPHREY 9.7     Recent Labs   Lab Test 04/16/24  1538 04/12/23  113  "11/08/22  0949   CR 0.55* 0.59* 0.62*     Recent Labs   Lab Test 04/16/24  1538 09/22/23  1337 04/12/23  1131   A1C 11.3* 13.8* 11.8*          Recent Labs   Lab Test 04/16/24  1538   WBC 10.3   HGB 17.1   HCT 46.3   MCV 83        Recent Labs   Lab Test 04/16/24  1538 11/08/22  0949 02/09/21  1559   HGB 17.1 15.8 15.7    No results for input(s): \"TROPONINI\" in the last 25017 hours.  No results for input(s): \"BNP\", \"NTBNPI\", \"NTBNP\" in the last 64130 hours.  Recent Labs   Lab Test 02/09/21  1559   TSH 0.82     No results for input(s): \"INR\" in the last 18450 hours.     Tisha Florian MD      Thank you for allowing me to participate in the care of your patient.      Sincerely,     Tisha Florian MD     [facility]  cc:   Carlyn Kwon, CNP  [address] [address]      "

## 2024-05-02 NOTE — PROGRESS NOTES
HEART CARE ENCOUNTER CONSULTATON NOTE      [facility]  [phone]      Assessment/Recommendations   Assessment:  1.  Dyslipidemia: Hypertriglyceridemia likely secondary to poorly controlled diabetes.  Given extreme elevation in triglycerides will recommend adding a fibrate for improved control.  Continue on statin therapy.  Consider CT coronary calcium screen for further cardiac risk assessment.  2.  Left ventricular hypertrophy on echocardiogram from 2017  3.  Diabetes mellitus type 1 poorly controlled  4.  History of substance abuse in remission for 3 years  5.  Obesity      Plan:  1.  Add fenofibrate 48 mg daily, continue low-carb diet and work on better diabetes management  2.  Agree with endocrine consultation for better diabetes management  3.  Continue heart healthy diabetic diet and regular exercise, weight loss  4.  Echocardiogram  Follow-up in 1 year       History of Present Illness/Subjective    HPI: Wolf Gibbs is a 38 year old male with history of hypertension, poorly controlled diabetes mellitus type 1, dyslipidemia who I am seeing today for initial consultation for lipid management.  He exercises regularly.  He does a mix of cardio and weightlifting and denies any exertional symptoms.  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  His blood pressure is now better controlled on current medical therapy than it used to be.  He has been having trouble controlling his diabetes.  It had been working better on his insulin pump however due to cost he has had trouble with this recently.  Last A1c was 11%.  Triglycerides over 1000 with HDL 27 and LDL of 49.  He tries to eat healthy.  He eats fairly healthy throughout the week and uses Sundays as his cheat days.  He eats a fairly low-carb diet.       Physical Examination  Review of Systems   Vitals: /81 (BP Location: Left arm, Patient Position: Sitting, Cuff Size: Adult Large)   Pulse 88   Resp 16    Wt 99.8 kg (220 lb)   SpO2 97%   BMI 32.49 kg/m    BMI= Body mass index is 32.49 kg/m .  Wt Readings from Last 3 Encounters:   05/02/24 99.8 kg (220 lb)   04/18/24 104.6 kg (230 lb 9.6 oz)   04/16/24 99.8 kg (220 lb)       General Appearance:   no distress, normal body habitus   ENT/Mouth: membranes moist, no oral lesions or bleeding gums.      EYES:  no scleral icterus, normal conjunctivae   Neck: no carotid bruits or thyromegaly   Chest/Lungs:   lungs are clear to auscultation   Cardiovascular:   Regular. Normal first and second heart sounds with no murmur no edema bilaterally    Abdomen:  no organomegaly, masses, bruits, or tenderness; bowel sounds are present   Extremities: no cyanosis or clubbing   Skin: no xanthelasma, warm.    Neurologic: normal  bilateral, no tremors     Psychiatric: alert and oriented x3, calm        Please refer above for cardiac ROS details.        Medical History  Surgical History Family History Social History   Past Medical History:   Diagnosis Date    Tobacco use disorder 11/13/2010     Past Surgical History:   Procedure Laterality Date    NO PAST SURGERIES       Family History   Problem Relation Age of Onset    Hypertension Mother     Hyperthyroidism Mother     Alcoholism Father     Depression Father     Diabetes Type 1 Sister     Down Syndrome Sister     No Known Problems Brother     Myocardial Infarction Paternal Grandfather 48        Social History     Socioeconomic History    Marital status: Single     Spouse name: Not on file    Number of children: Not on file    Years of education: Not on file    Highest education level: Not on file   Occupational History    Not on file   Tobacco Use    Smoking status: Former     Current packs/day: 1.00     Types: Cigarettes    Smokeless tobacco: Never   Vaping Use    Vaping status: Former    Substances: Nicotine   Substance and Sexual Activity    Alcohol use: Not Currently    Drug use: Not on file    Sexual activity: Not on file   Other  Topics Concern    Not on file   Social History Narrative    Not on file     Social Determinants of Health     Financial Resource Strain: Low Risk  (9/22/2023)    Financial Resource Strain     Within the past 12 months, have you or your family members you live with been unable to get utilities (heat, electricity) when it was really needed?: No   Food Insecurity: Low Risk  (9/22/2023)    Food Insecurity     Within the past 12 months, did you worry that your food would run out before you got money to buy more?: No     Within the past 12 months, did the food you bought just not last and you didn t have money to get more?: No   Transportation Needs: Low Risk  (9/22/2023)    Transportation Needs     Within the past 12 months, has lack of transportation kept you from medical appointments, getting your medicines, non-medical meetings or appointments, work, or from getting things that you need?: No   Physical Activity: Not on file   Stress: Not on file   Social Connections: Unknown (1/1/2022)    [facility]    Social Connections     Frequency of Communication with Friends and Family: Not on file   Interpersonal Safety: Low Risk  (4/16/2024)    Interpersonal Safety     Do you feel physically and emotionally safe where you currently live?: Yes     Within the past 12 months, have you been hit, slapped, kicked or otherwise physically hurt by someone?: No     Within the past 12 months, have you been humiliated or emotionally abused in other ways by your partner or ex-partner?: No   Housing Stability: Low Risk  (9/22/2023)    Housing Stability     Do you have housing? : Yes     Are you worried about losing your housing?: No           Medications  Allergies   Current Outpatient Medications   Medication Sig Dispense Refill    blood glucose (GLUCOSE METER TEST) test strip testing 4-6x/ day 100 strip 11    blood glucose (NO BRAND SPECIFIED)  test strip Use to test blood sugar 4 times daily or as directed. 200 strip 2    blood glucose (NO BRAND SPECIFIED) test strip Instructions: testing 4-6x/ day 100 strip 11    insulin glargine (LANTUS SOLOSTAR) 100 UNIT/ML pen See Instructions, Instructions: 20 Subcutaneous daily, # 30 mL, 3 Refill(s), Type: Maintenance, Pharmacy: [id number], 20 Subcutaneous daily, 69, in, 03/04/21 14:16:00 CST, Height Measured, 231, lb, 03/04/21 14:16:00 CST, Weight Measured Strength: 100 UNIT/ML 30 mL 1    insulin lispro (HUMALOG KWIKPEN) 100 UNIT/ML (1 unit dial) KWIKPEN Taking up to 30 units a day divided between meals and snacks. 30 mL 1    insulin pen needle (32G X 4 MM) 32G X 4 MM miscellaneous injecting 4-6x/ day 100 each 11    losartan-hydrochlorothiazide (HYZAAR) 50-12.5 MG tablet Take 1 tablet by mouth daily 90 tablet 3    metFORMIN (GLUCOPHAGE) 500 MG tablet Take 1 tablet (500 mg) by mouth daily (with breakfast) 90 tablet 3    rosuvastatin (CRESTOR) 20 MG tablet Take 1 tablet (20 mg) by mouth daily 90 tablet 1    UNABLE TO FIND  (Patient not taking: Reported on 4/16/2024)       No Known Allergies       Lab Results    Chemistry/lipid CBC Cardiac Enzymes/BNP/TSH/INR   Recent Labs   Lab Test 04/16/24  1538 11/08/22  0949 09/25/20  1526   CHOL 231*   < > 196   HDL 27*   < > 25*   LDL 49   < > See comment   TRIG 1,159*   < > 985*   CHOLHDLRATIO  --   --  7.8*    < > = values in this interval not displayed.     Recent Labs   Lab Test 04/16/24  1538 04/12/23  1131 11/08/22  0949   LDL 49 131* 55     Recent Labs   Lab Test 04/16/24  1538      POTASSIUM 3.7   CHLORIDE 97*   CO2 22   *   BUN 14.3   CR 0.55*   GFRESTIMATED >90   HUMPHREY 9.7     Recent Labs   Lab Test 04/16/24  1538 04/12/23  1131 11/08/22  0949   CR 0.55* 0.59* 0.62*     Recent Labs   Lab Test 04/16/24  1538 09/22/23  1337 04/12/23  1131   A1C 11.3* 13.8* 11.8*          Recent Labs   Lab Test 04/16/24  1538   WBC 10.3   HGB 17.1   HCT 46.3  "  MCV 83        Recent Labs   Lab Test 04/16/24  1538 11/08/22  0949 02/09/21  1559   HGB 17.1 15.8 15.7    No results for input(s): \"TROPONINI\" in the last 77385 hours.  No results for input(s): \"BNP\", \"NTBNPI\", \"NTBNP\" in the last 40376 hours.  Recent Labs   Lab Test 02/09/21  1559   TSH 0.82     No results for input(s): \"INR\" in the last 33033 hours.     Tisha Florian MD                                      "

## 2024-05-17 NOTE — PROGRESS NOTES
Outcome for 05/17/24 6:56 AM: ConXtechhart message sent  Aminata Kam LPN   Outcome for 05/24/24 2:46 PM: Left Voicemail   Mirella Herrera MA  Outcome for 05/28/24 1:56 PM: Left Voicemail   Mirella Herrera MA  Outcome for 05/29/24 12:53 PM: Patient is not checking blood sugars- due cost of test strips.  Mirella Herrera MA

## 2024-05-24 ENCOUNTER — TELEPHONE (OUTPATIENT)
Dept: ENDOCRINOLOGY | Facility: CLINIC | Age: 39
End: 2024-05-24
Payer: COMMERCIAL

## 2024-05-24 NOTE — TELEPHONE ENCOUNTER
Called patient and left voicemail. Patient has an appointment on  5/29/24 . Need patient to upload their Tandem device to site for provider to review prior to their appointment.  Mirella Herrera MA     [[phone]]

## 2024-05-28 NOTE — TELEPHONE ENCOUNTER
Called patient and left voicemail. Patient has an appointment on  5/29/24 . Need patient to upload their Tandem device to site for provider to review prior to their appointment.  Mirella Herrera MA

## 2024-05-29 ENCOUNTER — VIRTUAL VISIT (OUTPATIENT)
Dept: ENDOCRINOLOGY | Facility: CLINIC | Age: 39
End: 2024-05-29
Attending: NURSE PRACTITIONER
Payer: COMMERCIAL

## 2024-05-29 ENCOUNTER — MYC MEDICAL ADVICE (OUTPATIENT)
Dept: ENDOCRINOLOGY | Facility: CLINIC | Age: 39
End: 2024-05-29

## 2024-05-29 DIAGNOSIS — E08.9 DIABETES MELLITUS DUE TO UNDERLYING CONDITION WITHOUT COMPLICATION, WITH LONG-TERM CURRENT USE OF INSULIN (H): ICD-10-CM

## 2024-05-29 DIAGNOSIS — Z79.4 DIABETES MELLITUS DUE TO UNDERLYING CONDITION WITHOUT COMPLICATION, WITH LONG-TERM CURRENT USE OF INSULIN (H): ICD-10-CM

## 2024-05-29 DIAGNOSIS — E13.9 LADA (LATENT AUTOIMMUNE DIABETES IN ADULTS), MANAGED AS TYPE 1 (H): ICD-10-CM

## 2024-05-29 PROCEDURE — 99204 OFFICE O/P NEW MOD 45 MIN: CPT | Mod: 95 | Performed by: PHYSICIAN ASSISTANT

## 2024-05-29 RX ORDER — INSULIN GLARGINE 100 [IU]/ML
25 INJECTION, SOLUTION SUBCUTANEOUS AT BEDTIME
Qty: 30 ML | Refills: 1 | Status: SHIPPED | OUTPATIENT
Start: 2024-05-29

## 2024-05-29 RX ORDER — INSULIN LISPRO 100 [IU]/ML
20 INJECTION, SOLUTION INTRAVENOUS; SUBCUTANEOUS
Qty: 45 ML | Refills: 1 | Status: SHIPPED | OUTPATIENT
Start: 2024-05-29 | End: 2024-06-03

## 2024-05-29 RX ORDER — ACYCLOVIR 400 MG/1
TABLET ORAL
Qty: 3 EACH | Refills: 5 | Status: SHIPPED | OUTPATIENT
Start: 2024-05-29

## 2024-05-29 NOTE — NURSING NOTE
Is the patient currently in the state of [place]? YES    Visit mode:VIDEO    If the visit is dropped, the patient can be reconnected by: VIDEO VISIT: Text to cell phone:   Telephone Information:   Mobile [phone]       Will anyone else be joining the visit? NO  (If patient encounters technical issues they should call [phone]    How would you like to obtain your AVS? MyChart    Are changes needed to the allergy or medication list? No    Are refills needed on medications prescribed by this physician? YES, Lispro    Reason for visit: RECHECK    Mirella Herrera CMA VVF

## 2024-05-29 NOTE — LETTER
5/29/2024         RE: Wolf Gibbs  [address]  [facility] [address]        Dear Colleague,    Thank you for referring your patient, Wolf Gibbs, to the [facility]. Please see a copy of my visit note below.    Outcome for 05/17/24 6:56 AM: Table8hart message sent  Aminata Kam LPN   Outcome for 05/24/24 2:46 PM: Left Voicemail   Mirella Herrera MA  Outcome for 05/28/24 1:56 PM: Left Voicemail   Mirella Herrera MA  Outcome for 05/29/24 12:53 PM: Patient is not checking blood sugars- due cost of test strips.  Mirella Herrera MA      Assessment/Plan :   TIFFANY, managed as Type 1 DM. Wolf knows that he needs to get his blood sugars under better control. He is really frustrated with the cost of insulin pump supplies. He also admits that he struggled with always having a device attached. However, he feels like his overall control was better with the insulin pump. We discussed that, for now, we just need to get his blood sugars under more consistent control. The best way to get his numbers down is with the use of a CGMS sensor. We discussed the current options and I will send in a new prescription for the Dexcom G7 sensor. If he is unable to fill the prescription at his local pharmacy, due to cost, he will send me a Wordstert message. He needs to check his blood sugars. I am not going to make any adjustments to his current insulin dosing because I have no information on his current blood sugars. For now, he will continue with Lantus and Humalog. We will follow-up in 2-3 mos and I will reach out to our CDE team, to schedule a follow-up appt, as well.    Due to the COVID 19 pandemic this visit was a telephone/video visit in order to help prevent spread of infection in this patient and the general population. The patient gave verbal consent for the visit today. I have independently reviewed and interpreted labs, imaging as indicated.       Distant Location (provider location):   Off-site  Mode of Communication:  Video Conference via [facility]  Chart review/prep time 10    Joined the call at 5/29/2024, 1:02:10 pm.  Left the call at 5/29/2024, 1:29:00 pm.  You were on the call for 26 minutes 49 seconds .  40 minutes spent on the date of the encounter doing chart review, history and exam, documentation and further activities as noted above.      Chief complaint:  Wolf is a 39 year old male seen in consultation at the request of Carlyn Kwon CNP for TIFFANY managed as Type 1 DM.    I have reviewed Care Everywhere including [facility], [facility], [facility], [place], [facility], [facility] , [facility], [facility] lab reports, imaging reports and provider notes as indicated.      HISTORY OF PRESENT ILLNESS  Wolf was originally diagnosed with diabetes in February of 2021 after spending the weekend in [place]. He presented to the ER in [place] and explained that he had been hospitalized while on vacation for DKA. Upon arrival to the ER his blood sugar was 462 mg/dl. He was discharged with a new prescription for metformin. Follow-up testing found him to be ITA and IA2 antibody positive with a normal c peptide. He was started on insulin and then quickly transitioned to the Tandem t:slim X2 insulin pump along with the Dexcom G6 sensor.    Wolf feels like he did really well on the insulin pump and sensor. However, he ran into problems with cost and insurance coverage. He just could not continue to afford the insulin pump supplies or sensor. He remembers being told that the cost of the sensor was going to increase from $120 to $600. He went back to MDI insulin therapy and fingerstick testing. He is currently taking 25 unit(s) of Lantus at night and 10-20 unit(s) of Humalog with meals. The insulin has been much more affordable but he was told that his test strips would cost $300, so he has not been monitoring his blood sugars.    Wolf states that his current blood sugars are  probably over 250 mg/dl. He does experience low blood sugars a couple times of month. He states that he is usually symptomatic around 90 mg/dl but he has been as low as 70 mg/dl. He will usually drink a soda or eat a candy bar. He does have glucose gummies at home, as well. He has never a severe hypoglycemia reaction. He also denies any problems with blurred vision or an increase in numbness/tingling in his feet. He states that he actually feels really good, but he is worried about the long term effects of hyperglycemia.    Endocrine relevant labs are as follows:   Latest Reference Range & Units 04/16/24 15:38   Hemoglobin A1C 0.0 - 5.6 % 11.3 (H)   (H): Data is abnormally high   Latest Reference Range & Units 04/16/24 15:38   Albumin Urine mg/g Cr 0.00 - 17.00 mg/g Cr 135.90 (H)   (H): Data is abnormally high   Latest Reference Range & Units 04/16/24 15:38   Albumin Urine mg/L mg/L 120.0      Latest Reference Range & Units 09/22/23 13:37   Hemoglobin A1C 0.0 - 5.6 % 13.8 (H)   (H): Data is abnormally high    REVIEW OF SYSTEMS    Endocrine: positive for diabetes  Skin: negative  Eyes: negative for, visual blurring, redness, tearing  Ears/Nose/Throat: negative  Respiratory: No shortness of breath, dyspnea on exertion, cough, or hemoptysis  Cardiovascular: negative for, chest pain, dyspnea on exertion, lower extremity edema, and exercise intolerance  Gastrointestinal: negative for, nausea, vomiting, constipation, and diarrhea  Genitourinary: negative for, nocturia, dysuria, frequency, and urgency  Musculoskeletal: negative for, muscular weakness, nocturnal cramping, and foot pain  Neurologic: negative for, local weakness, numbness or tingling of hands, and numbness or tingling of feet  Psychiatric: negative  Hematologic/Lymphatic/Immunologic: negative    Past Medical History  Past Medical History:   Diagnosis Date     Tobacco use disorder 11/13/2010       Medications  Current Outpatient Medications   Medication Sig  Dispense Refill     blood glucose (GLUCOSE METER TEST) test strip testing 4-6x/ day 100 strip 11     blood glucose (NO BRAND SPECIFIED) test strip Use to test blood sugar 4 times daily or as directed. 200 strip 2     blood glucose (NO BRAND SPECIFIED) test strip Instructions: testing 4-6x/ day 100 strip 11     fenofibrate (TRICOR) 48 MG tablet Take 1 tablet (48 mg) by mouth daily 90 tablet 3     insulin glargine (LANTUS SOLOSTAR) 100 UNIT/ML pen See Instructions, Instructions: 20 Subcutaneous daily, # 30 mL, 3 Refill(s), Type: Maintenance, Pharmacy: [facility] #[id number], 20 Subcutaneous daily, 69, in, 03/04/21 14:16:00 CST, Height Measured, 231, lb, 03/04/21 14:16:00 CST, Weight Measured Strength: 100 UNIT/ML 30 mL 1     insulin lispro (HUMALOG KWIKPEN) 100 UNIT/ML (1 unit dial) KWIKPEN Taking up to 30 units a day divided between meals and snacks. 30 mL 1     insulin pen needle (32G X 4 MM) 32G X 4 MM miscellaneous injecting 4-6x/ day 100 each 11     losartan-hydrochlorothiazide (HYZAAR) 50-12.5 MG tablet Take 1 tablet by mouth daily 90 tablet 3     metFORMIN (GLUCOPHAGE) 500 MG tablet Take 1 tablet (500 mg) by mouth daily (with breakfast) 90 tablet 3     rosuvastatin (CRESTOR) 20 MG tablet Take 1 tablet (20 mg) by mouth daily 90 tablet 1     UNABLE TO FIND  (Patient not taking: Reported on 4/16/2024)         Allergies  No Known Allergies    Family History  family history includes Alcoholism in his father; Depression in his father; Diabetes Type 1 in his sister; Down Syndrome in his sister; Hypertension in his mother; Hyperthyroidism in his mother; Myocardial Infarction (age of onset: 48) in his paternal grandfather; No Known Problems in his brother.    Social History  Social History     Tobacco Use     Smoking status: Former     Current packs/day: 1.00     Types: Cigarettes     Smokeless tobacco: Never   Vaping Use     Vaping status: Former     Substances: Nicotine   Substance Use Topics     Alcohol use: Not  Currently       Physical Exam  There is no height or weight on file to calculate BMI.  GENERAL: no distress  SKIN: Visible skin clear. No significant rash, abnormal pigmentation or lesions.  EYES: Eyes grossly normal to inspection.  No discharge or erythema, or obvious scleral/conjunctival abnormalities.  NECK: visible goiter is not present; no visible neck masses  RESP: No audible wheeze, cough, or visible cyanosis.  No visible retractions or increased work of breathing.    NEURO: Awake, alert, responds appropriately to questions.  Mentation and speech fluent.  PSYCH:affect normal and appearance well-groomed.      DATA REVIEW  He has not been monitoring his blood sugars for the last month or so        Again, thank you for allowing me to participate in the care of your patient.        Sincerely,        Lalita Bermeo PA-C

## 2024-05-29 NOTE — PROGRESS NOTES
Assessment/Plan :   TIFFANY, managed as Type 1 DM. Wolf knows that he needs to get his blood sugars under better control. He is really frustrated with the cost of insulin pump supplies. He also admits that he struggled with always having a device attached. However, he feels like his overall control was better with the insulin pump. We discussed that, for now, we just need to get his blood sugars under more consistent control. The best way to get his numbers down is with the use of a CGMS sensor. We discussed the current options and I will send in a new prescription for the Dexcom G7 sensor. If he is unable to fill the prescription at his local pharmacy, due to cost, he will send me a [employer] message. He needs to check his blood sugars. I am not going to make any adjustments to his current insulin dosing because I have no information on his current blood sugars. For now, he will continue with Lantus and Humalog. We will follow-up in 2-3 mos and I will reach out to our CDE team, to schedule a follow-up appt, as well.    Due to the COVID 19 pandemic this visit was a telephone/video visit in order to help prevent spread of infection in this patient and the general population. The patient gave verbal consent for the visit today. I have independently reviewed and interpreted labs, imaging as indicated.       Distant Location (provider location):  Off-site  Mode of Communication:  Video Conference via [employer]  Chart review/prep time 10    Joined the call at 5/29/2024, 1:02:10 pm.  Left the call at 5/29/2024, 1:29:00 pm.  You were on the call for 26 minutes 49 seconds .  40 minutes spent on the date of the encounter doing chart review, history and exam, documentation and further activities as noted above.      Chief complaint:  Wolf is a 39 year old male seen in consultation at the request of Carlyn Kwon CNP for TIFFANY managed as Type 1 DM.    I have reviewed Care Everywhere including [facility], [facility], [facility]  [facility], [facility], [place], [facility], [facility] , [facility], [place] lab reports, imaging reports and provider notes as indicated.      HISTORY OF PRESENT ILLNESS  Wolf was originally diagnosed with diabetes in February of 2021 after spending the weekend in [place]. He presented to the ER in [place] and explained that he had been hospitalized while on vacation for DKA. Upon arrival to the ER his blood sugar was 462 mg/dl. He was discharged with a new prescription for metformin. Follow-up testing found him to be ITA and IA2 antibody positive with a normal c peptide. He was started on insulin and then quickly transitioned to the Tandem t:slim X2 insulin pump along with the Dexcom G6 sensor.    Wolf feels like he did really well on the insulin pump and sensor. However, he ran into problems with cost and insurance coverage. He just could not continue to afford the insulin pump supplies or sensor. He remembers being told that the cost of the sensor was going to increase from $120 to $600. He went back to MDI insulin therapy and fingerstick testing. He is currently taking 25 unit(s) of Lantus at night and 10-20 unit(s) of Humalog with meals. The insulin has been much more affordable but he was told that his test strips would cost $300, so he has not been monitoring his blood sugars.    Wolf states that his current blood sugars are probably over 250 mg/dl. He does experience low blood sugars a couple times of month. He states that he is usually symptomatic around 90 mg/dl but he has been as low as 70 mg/dl. He will usually drink a soda or eat a candy bar. He does have glucose gummies at home, as well. He has never a severe hypoglycemia reaction. He also denies any problems with blurred vision or an increase in numbness/tingling in his feet. He states that he actually feels really good, but he is worried about the long term effects of hyperglycemia.    Endocrine relevant labs are as follows:   Latest Reference  Range & Units 04/16/24 15:38   Hemoglobin A1C 0.0 - 5.6 % 11.3 (H)   (H): Data is abnormally high   Latest Reference Range & Units 04/16/24 15:38   Albumin Urine mg/g Cr 0.00 - 17.00 mg/g Cr 135.90 (H)   (H): Data is abnormally high   Latest Reference Range & Units 04/16/24 15:38   Albumin Urine mg/L mg/L 120.0      Latest Reference Range & Units 09/22/23 13:37   Hemoglobin A1C 0.0 - 5.6 % 13.8 (H)   (H): Data is abnormally high    REVIEW OF SYSTEMS    Endocrine: positive for diabetes  Skin: negative  Eyes: negative for, visual blurring, redness, tearing  Ears/Nose/Throat: negative  Respiratory: No shortness of breath, dyspnea on exertion, cough, or hemoptysis  Cardiovascular: negative for, chest pain, dyspnea on exertion, lower extremity edema, and exercise intolerance  Gastrointestinal: negative for, nausea, vomiting, constipation, and diarrhea  Genitourinary: negative for, nocturia, dysuria, frequency, and urgency  Musculoskeletal: negative for, muscular weakness, nocturnal cramping, and foot pain  Neurologic: negative for, local weakness, numbness or tingling of hands, and numbness or tingling of feet  Psychiatric: negative  Hematologic/Lymphatic/Immunologic: negative    Past Medical History  Past Medical History:   Diagnosis Date    Tobacco use disorder 11/13/2010       Medications  Current Outpatient Medications   Medication Sig Dispense Refill    blood glucose (GLUCOSE METER TEST) test strip testing 4-6x/ day 100 strip 11    blood glucose (NO BRAND SPECIFIED) test strip Use to test blood sugar 4 times daily or as directed. 200 strip 2    blood glucose (NO BRAND SPECIFIED) test strip Instructions: testing 4-6x/ day 100 strip 11    fenofibrate (TRICOR) 48 MG tablet Take 1 tablet (48 mg) by mouth daily 90 tablet 3    insulin glargine (LANTUS SOLOSTAR) 100 UNIT/ML pen See Instructions, Instructions: 20 Subcutaneous daily, # 30 mL, 3 Refill(s), Type: Maintenance, Pharmacy: [id number], 20  Subcutaneous daily, 69, in, 03/04/21 14:16:00 CST, Height Measured, 231, lb, 03/04/21 14:16:00 CST, Weight Measured Strength: 100 UNIT/ML 30 mL 1    insulin lispro (HUMALOG KWIKPEN) 100 UNIT/ML (1 unit dial) KWIKPEN Taking up to 30 units a day divided between meals and snacks. 30 mL 1    insulin pen needle (32G X 4 MM) 32G X 4 MM miscellaneous injecting 4-6x/ day 100 each 11    losartan-hydrochlorothiazide (HYZAAR) 50-12.5 MG tablet Take 1 tablet by mouth daily 90 tablet 3    metFORMIN (GLUCOPHAGE) 500 MG tablet Take 1 tablet (500 mg) by mouth daily (with breakfast) 90 tablet 3    rosuvastatin (CRESTOR) 20 MG tablet Take 1 tablet (20 mg) by mouth daily 90 tablet 1    UNABLE TO FIND  (Patient not taking: Reported on 4/16/2024)         Allergies  No Known Allergies    Family History  family history includes Alcoholism in his father; Depression in his father; Diabetes Type 1 in his sister; Down Syndrome in his sister; Hypertension in his mother; Hyperthyroidism in his mother; Myocardial Infarction (age of onset: 48) in his paternal grandfather; No Known Problems in his brother.    Social History  Social History     Tobacco Use    Smoking status: Former     Current packs/day: 1.00     Types: Cigarettes    Smokeless tobacco: Never   Vaping Use    Vaping status: Former    Substances: Nicotine   Substance Use Topics    Alcohol use: Not Currently       Physical Exam  There is no height or weight on file to calculate BMI.  GENERAL: no distress  SKIN: Visible skin clear. No significant rash, abnormal pigmentation or lesions.  EYES: Eyes grossly normal to inspection.  No discharge or erythema, or obvious scleral/conjunctival abnormalities.  NECK: visible goiter is not present; no visible neck masses  RESP: No audible wheeze, cough, or visible cyanosis.  No visible retractions or increased work of breathing.    NEURO: Awake, alert, responds appropriately to questions.  Mentation and speech fluent.  PSYCH:affect normal and  appearance well-groomed.      DATA REVIEW  He has not been monitoring his blood sugars for the last month or so

## 2024-06-03 ENCOUNTER — TELEPHONE (OUTPATIENT)
Dept: ENDOCRINOLOGY | Facility: CLINIC | Age: 39
End: 2024-06-03
Payer: COMMERCIAL

## 2024-06-03 DIAGNOSIS — E08.9 DIABETES MELLITUS DUE TO UNDERLYING CONDITION WITHOUT COMPLICATION, WITH LONG-TERM CURRENT USE OF INSULIN (H): ICD-10-CM

## 2024-06-03 DIAGNOSIS — Z79.4 DIABETES MELLITUS DUE TO UNDERLYING CONDITION WITHOUT COMPLICATION, WITH LONG-TERM CURRENT USE OF INSULIN (H): ICD-10-CM

## 2024-06-03 DIAGNOSIS — E13.9 LADA (LATENT AUTOIMMUNE DIABETES IN ADULTS), MANAGED AS TYPE 1 (H): ICD-10-CM

## 2024-06-03 RX ORDER — INSULIN LISPRO 100 [IU]/ML
30 INJECTION, SOLUTION INTRAVENOUS; SUBCUTANEOUS
Qty: 75 ML | Refills: 1 | Status: SHIPPED | OUTPATIENT
Start: 2024-06-03

## 2024-06-03 RX ORDER — INSULIN LISPRO 100 [IU]/ML
30 INJECTION, SOLUTION INTRAVENOUS; SUBCUTANEOUS
Qty: 75 ML | Refills: 1 | Status: SHIPPED | OUTPATIENT
Start: 2024-06-03 | End: 2024-06-03

## 2024-06-03 NOTE — TELEPHONE ENCOUNTER
Pharmacy called and has questions about injecting up to 30 units a day, please call to clarify, thanks!       [facility] [place] - [address]

## 2024-06-03 NOTE — TELEPHONE ENCOUNTER
Updated prescription was sent to pharmacy by Lalita Bermeo PA-C. Please see 5/29/24 [employer] message.       Matilde Robison RN  Endocrine Care Coordinator  [facility]

## 2024-06-03 NOTE — TELEPHONE ENCOUNTER
"Pharmacy needing a new prescription to be sent on patient's Humalog. Dosing notes 20 units 3 times daily but states \"taking 30 units a day...\"    Outpatient Medication Detail     Disp Refills Start End JULISSA   insulin lispro (HUMALOG KWIKPEN) 100 UNIT/ML (1 unit dial) KWIKPEN 45 mL 1 5/29/2024 -- No   Sig - Route: Inject 20 Units Subcutaneous 3 times daily (before meals) Taking up to 30 units a day divided between meals and snacks. - Subcutaneous   Sent to pharmacy as: Insulin Lispro (1 Unit Dial) 100 UNIT/ML Subcutaneous Solution Pen-injector (HumaLOG KWIKPEN)   Class: E-Prescribe   Order: [id number]   E-Prescribing Status: Receipt confirmed by pharmacy (5/29/2024  1:30 PM CDT)         Dao end to Lalita to send updated prescription to pharmacy.        Matilde Robison, RN  Endocrine Care Coordinator  [facility]    "

## 2024-06-04 ENCOUNTER — OFFICE VISIT (OUTPATIENT)
Dept: FAMILY MEDICINE | Facility: CLINIC | Age: 39
End: 2024-06-04
Payer: COMMERCIAL

## 2024-06-04 ENCOUNTER — HOSPITAL ENCOUNTER (INPATIENT)
Facility: CLINIC | Age: 39
LOS: 1 days | Discharge: HOME | End: 2024-06-06
Attending: EMERGENCY MEDICINE | Admitting: STUDENT IN AN ORGANIZED HEALTH CARE EDUCATION/TRAINING PROGRAM
Payer: COMMERCIAL

## 2024-06-04 ENCOUNTER — APPOINTMENT (OUTPATIENT)
Dept: ULTRASOUND IMAGING | Facility: CLINIC | Age: 39
End: 2024-06-04
Attending: EMERGENCY MEDICINE
Payer: COMMERCIAL

## 2024-06-04 VITALS
SYSTOLIC BLOOD PRESSURE: 126 MMHG | BODY MASS INDEX: 31.44 KG/M2 | TEMPERATURE: 98.9 F | DIASTOLIC BLOOD PRESSURE: 82 MMHG | OXYGEN SATURATION: 97 % | WEIGHT: 212.3 LBS | HEART RATE: 105 BPM | HEIGHT: 69 IN | RESPIRATION RATE: 18 BRPM

## 2024-06-04 DIAGNOSIS — L02.91 PHLEGMON: ICD-10-CM

## 2024-06-04 DIAGNOSIS — R73.9 HYPERGLYCEMIA: ICD-10-CM

## 2024-06-04 DIAGNOSIS — L03.115 CELLULITIS OF RIGHT LOWER EXTREMITY: Primary | ICD-10-CM

## 2024-06-04 DIAGNOSIS — L03.115 CELLULITIS OF RIGHT THIGH: ICD-10-CM

## 2024-06-04 PROBLEM — E11.9 DIABETES MELLITUS (H): Status: ACTIVE | Noted: 2022-07-19

## 2024-06-04 PROBLEM — E13.9 LADA (LATENT AUTOIMMUNE DIABETES IN ADULTS), MANAGED AS TYPE 1 (H): Status: ACTIVE | Noted: 2022-07-19

## 2024-06-04 LAB
ANION GAP SERPL CALCULATED.3IONS-SCNC: 15 MMOL/L (ref 7–15)
B-OH-BUTYR SERPL-SCNC: 0.8 MMOL/L
BASE EXCESS BLDV CALC-SCNC: 3.4 MMOL/L (ref -3–3)
BASOPHILS # BLD AUTO: 0 10E3/UL (ref 0–0.2)
BASOPHILS NFR BLD AUTO: 0 %
BUN SERPL-MCNC: 13.3 MG/DL (ref 6–20)
CALCIUM SERPL-MCNC: 9.7 MG/DL (ref 8.6–10)
CHLORIDE SERPL-SCNC: 98 MMOL/L (ref 98–107)
CREAT SERPL-MCNC: 0.51 MG/DL (ref 0.67–1.17)
DEPRECATED HCO3 PLAS-SCNC: 24 MMOL/L (ref 22–29)
EGFRCR SERPLBLD CKD-EPI 2021: >90 ML/MIN/1.73M2
EOSINOPHIL # BLD AUTO: 0.3 10E3/UL (ref 0–0.7)
EOSINOPHIL NFR BLD AUTO: 3 %
ERYTHROCYTE [DISTWIDTH] IN BLOOD BY AUTOMATED COUNT: 12.1 % (ref 10–15)
GLUCOSE BLDC GLUCOMTR-MCNC: 284 MG/DL (ref 70–99)
GLUCOSE BLDC GLUCOMTR-MCNC: 358 MG/DL (ref 70–99)
GLUCOSE SERPL-MCNC: 322 MG/DL (ref 70–99)
HCO3 BLDV-SCNC: 28 MMOL/L (ref 21–28)
HCT VFR BLD AUTO: 41 % (ref 40–53)
HGB BLD-MCNC: 14.9 G/DL (ref 13.3–17.7)
HOLD SPECIMEN: NORMAL
HOLD SPECIMEN: NORMAL
IMM GRANULOCYTES # BLD: 0 10E3/UL
IMM GRANULOCYTES NFR BLD: 0 %
LACTATE SERPL-SCNC: 0.9 MMOL/L (ref 0.7–2)
LYMPHOCYTES # BLD AUTO: 1.4 10E3/UL (ref 0.8–5.3)
LYMPHOCYTES NFR BLD AUTO: 14 %
MCH RBC QN AUTO: 31 PG (ref 26.5–33)
MCHC RBC AUTO-ENTMCNC: 36.3 G/DL (ref 31.5–36.5)
MCV RBC AUTO: 85 FL (ref 78–100)
MONOCYTES # BLD AUTO: 0.5 10E3/UL (ref 0–1.3)
MONOCYTES NFR BLD AUTO: 5 %
NEUTROPHILS # BLD AUTO: 7.8 10E3/UL (ref 1.6–8.3)
NEUTROPHILS NFR BLD AUTO: 78 %
NRBC # BLD AUTO: 0 10E3/UL
NRBC BLD AUTO-RTO: 0 /100
O2/TOTAL GAS SETTING VFR VENT: 21 %
OXYHGB MFR BLDV: 66 % (ref 70–75)
PCO2 BLDV: 43 MM HG (ref 40–50)
PH BLDV: 7.43 [PH] (ref 7.32–7.43)
PLATELET # BLD AUTO: 139 10E3/UL (ref 150–450)
PO2 BLDV: 35 MM HG (ref 25–47)
POTASSIUM SERPL-SCNC: 4.3 MMOL/L (ref 3.4–5.3)
RBC # BLD AUTO: 4.81 10E6/UL (ref 4.4–5.9)
SAO2 % BLDV: 66.7 % (ref 70–75)
SODIUM SERPL-SCNC: 137 MMOL/L (ref 135–145)
WBC # BLD AUTO: 10 10E3/UL (ref 4–11)

## 2024-06-04 PROCEDURE — 82962 GLUCOSE BLOOD TEST: CPT

## 2024-06-04 PROCEDURE — 99285 EMERGENCY DEPT VISIT HI MDM: CPT | Mod: 25

## 2024-06-04 PROCEDURE — 258N000003 HC RX IP 258 OP 636

## 2024-06-04 PROCEDURE — 96366 THER/PROPH/DIAG IV INF ADDON: CPT

## 2024-06-04 PROCEDURE — G0378 HOSPITAL OBSERVATION PER HR: HCPCS

## 2024-06-04 PROCEDURE — 96375 TX/PRO/DX INJ NEW DRUG ADDON: CPT

## 2024-06-04 PROCEDURE — 87040 BLOOD CULTURE FOR BACTERIA: CPT | Performed by: EMERGENCY MEDICINE

## 2024-06-04 PROCEDURE — 250N000013 HC RX MED GY IP 250 OP 250 PS 637

## 2024-06-04 PROCEDURE — 80048 BASIC METABOLIC PNL TOTAL CA: CPT | Performed by: EMERGENCY MEDICINE

## 2024-06-04 PROCEDURE — 99223 1ST HOSP IP/OBS HIGH 75: CPT | Mod: GC

## 2024-06-04 PROCEDURE — 250N000011 HC RX IP 250 OP 636

## 2024-06-04 PROCEDURE — 82010 KETONE BODYS QUAN: CPT | Performed by: EMERGENCY MEDICINE

## 2024-06-04 PROCEDURE — 83605 ASSAY OF LACTIC ACID: CPT | Performed by: EMERGENCY MEDICINE

## 2024-06-04 PROCEDURE — 96376 TX/PRO/DX INJ SAME DRUG ADON: CPT

## 2024-06-04 PROCEDURE — 85041 AUTOMATED RBC COUNT: CPT | Performed by: EMERGENCY MEDICINE

## 2024-06-04 PROCEDURE — 96365 THER/PROPH/DIAG IV INF INIT: CPT | Mod: 59

## 2024-06-04 PROCEDURE — 99207 PR INPT ADMISSION FROM CLINIC: CPT | Performed by: NURSE PRACTITIONER

## 2024-06-04 PROCEDURE — 250N000012 HC RX MED GY IP 250 OP 636 PS 637

## 2024-06-04 PROCEDURE — 96361 HYDRATE IV INFUSION ADD-ON: CPT

## 2024-06-04 PROCEDURE — 82805 BLOOD GASES W/O2 SATURATION: CPT | Performed by: EMERGENCY MEDICINE

## 2024-06-04 PROCEDURE — 258N000003 HC RX IP 258 OP 636: Performed by: EMERGENCY MEDICINE

## 2024-06-04 PROCEDURE — 36415 COLL VENOUS BLD VENIPUNCTURE: CPT | Performed by: EMERGENCY MEDICINE

## 2024-06-04 PROCEDURE — 76882 US LMTD JT/FCL EVL NVASC XTR: CPT | Mod: RT

## 2024-06-04 PROCEDURE — 250N000011 HC RX IP 250 OP 636: Performed by: EMERGENCY MEDICINE

## 2024-06-04 RX ORDER — CHLORAL HYDRATE 500 MG
1 CAPSULE ORAL DAILY
COMMUNITY
End: 2024-08-28

## 2024-06-04 RX ORDER — ROSUVASTATIN CALCIUM 10 MG/1
20 TABLET, COATED ORAL DAILY
Status: DISCONTINUED | OUTPATIENT
Start: 2024-06-04 | End: 2024-06-06 | Stop reason: HOSPADM

## 2024-06-04 RX ORDER — IBUPROFEN 600 MG/1
600 TABLET, FILM COATED ORAL EVERY 6 HOURS PRN
Status: DISCONTINUED | OUTPATIENT
Start: 2024-06-04 | End: 2024-06-04 | Stop reason: ALTCHOICE

## 2024-06-04 RX ORDER — NALOXONE HYDROCHLORIDE 0.4 MG/ML
0.4 INJECTION, SOLUTION INTRAMUSCULAR; INTRAVENOUS; SUBCUTANEOUS
Status: DISCONTINUED | OUTPATIENT
Start: 2024-06-04 | End: 2024-06-06 | Stop reason: HOSPADM

## 2024-06-04 RX ORDER — CALCIUM CARBONATE 500 MG/1
1000 TABLET, CHEWABLE ORAL 4 TIMES DAILY PRN
Status: DISCONTINUED | OUTPATIENT
Start: 2024-06-04 | End: 2024-06-06 | Stop reason: HOSPADM

## 2024-06-04 RX ORDER — HYDROXYZINE HYDROCHLORIDE 25 MG/1
50 TABLET, FILM COATED ORAL EVERY 6 HOURS PRN
Status: DISCONTINUED | OUTPATIENT
Start: 2024-06-04 | End: 2024-06-06 | Stop reason: HOSPADM

## 2024-06-04 RX ORDER — LOSARTAN POTASSIUM AND HYDROCHLOROTHIAZIDE 12.5; 5 MG/1; MG/1
1 TABLET ORAL DAILY
Status: DISCONTINUED | OUTPATIENT
Start: 2024-06-04 | End: 2024-06-06 | Stop reason: HOSPADM

## 2024-06-04 RX ORDER — AMOXICILLIN 250 MG
1 CAPSULE ORAL 2 TIMES DAILY PRN
Status: DISCONTINUED | OUTPATIENT
Start: 2024-06-04 | End: 2024-06-06 | Stop reason: HOSPADM

## 2024-06-04 RX ORDER — OXYCODONE HYDROCHLORIDE 5 MG/1
5 TABLET ORAL EVERY 6 HOURS PRN
Status: DISCONTINUED | OUTPATIENT
Start: 2024-06-04 | End: 2024-06-06

## 2024-06-04 RX ORDER — ACETAMINOPHEN 325 MG/1
650 TABLET ORAL EVERY 4 HOURS PRN
Status: DISCONTINUED | OUTPATIENT
Start: 2024-06-04 | End: 2024-06-06 | Stop reason: HOSPADM

## 2024-06-04 RX ORDER — NICOTINE POLACRILEX 4 MG
15-30 LOZENGE BUCCAL
Status: DISCONTINUED | OUTPATIENT
Start: 2024-06-04 | End: 2024-06-06 | Stop reason: HOSPADM

## 2024-06-04 RX ORDER — LIDOCAINE 40 MG/G
CREAM TOPICAL
Status: DISCONTINUED | OUTPATIENT
Start: 2024-06-04 | End: 2024-06-06 | Stop reason: HOSPADM

## 2024-06-04 RX ORDER — NALOXONE HYDROCHLORIDE 0.4 MG/ML
0.2 INJECTION, SOLUTION INTRAMUSCULAR; INTRAVENOUS; SUBCUTANEOUS
Status: DISCONTINUED | OUTPATIENT
Start: 2024-06-04 | End: 2024-06-06 | Stop reason: HOSPADM

## 2024-06-04 RX ORDER — MULTIVITAMIN,THERAPEUTIC
1 TABLET ORAL DAILY
COMMUNITY

## 2024-06-04 RX ORDER — AMOXICILLIN 250 MG
2 CAPSULE ORAL 2 TIMES DAILY PRN
Status: DISCONTINUED | OUTPATIENT
Start: 2024-06-04 | End: 2024-06-06 | Stop reason: HOSPADM

## 2024-06-04 RX ORDER — FENOFIBRATE 48 MG/1
48 TABLET, COATED ORAL DAILY
Status: DISCONTINUED | OUTPATIENT
Start: 2024-06-04 | End: 2024-06-06 | Stop reason: HOSPADM

## 2024-06-04 RX ORDER — HYDROMORPHONE HYDROCHLORIDE 1 MG/ML
0.5 INJECTION, SOLUTION INTRAMUSCULAR; INTRAVENOUS; SUBCUTANEOUS
Status: DISCONTINUED | OUTPATIENT
Start: 2024-06-04 | End: 2024-06-04

## 2024-06-04 RX ORDER — SODIUM CHLORIDE, SODIUM LACTATE, POTASSIUM CHLORIDE, CALCIUM CHLORIDE 600; 310; 30; 20 MG/100ML; MG/100ML; MG/100ML; MG/100ML
INJECTION, SOLUTION INTRAVENOUS CONTINUOUS
Status: DISCONTINUED | OUTPATIENT
Start: 2024-06-04 | End: 2024-06-05

## 2024-06-04 RX ORDER — IBUPROFEN 600 MG/1
600 TABLET, FILM COATED ORAL EVERY 6 HOURS PRN
COMMUNITY

## 2024-06-04 RX ORDER — OXYCODONE HYDROCHLORIDE 5 MG/1
5 TABLET ORAL EVERY 6 HOURS PRN
Status: ON HOLD | COMMUNITY
End: 2024-06-06

## 2024-06-04 RX ORDER — DEXTROSE MONOHYDRATE 25 G/50ML
25-50 INJECTION, SOLUTION INTRAVENOUS
Status: DISCONTINUED | OUTPATIENT
Start: 2024-06-04 | End: 2024-06-06 | Stop reason: HOSPADM

## 2024-06-04 RX ORDER — ACETAMINOPHEN 650 MG/1
650 SUPPOSITORY RECTAL EVERY 4 HOURS PRN
Status: DISCONTINUED | OUTPATIENT
Start: 2024-06-04 | End: 2024-06-06 | Stop reason: HOSPADM

## 2024-06-04 RX ORDER — HYDROMORPHONE HYDROCHLORIDE 2 MG/1
2 TABLET ORAL EVERY 4 HOURS PRN
Status: DISCONTINUED | OUTPATIENT
Start: 2024-06-04 | End: 2024-06-06

## 2024-06-04 RX ORDER — KETOROLAC TROMETHAMINE 15 MG/ML
15 INJECTION, SOLUTION INTRAMUSCULAR; INTRAVENOUS EVERY 6 HOURS PRN
Status: DISCONTINUED | OUTPATIENT
Start: 2024-06-05 | End: 2024-06-06 | Stop reason: HOSPADM

## 2024-06-04 RX ORDER — CEPHALEXIN 500 MG/1
500 CAPSULE ORAL 4 TIMES DAILY
Status: ON HOLD | COMMUNITY
Start: 2024-06-02 | End: 2024-06-06

## 2024-06-04 RX ORDER — HYDROMORPHONE HYDROCHLORIDE 1 MG/ML
.5-1 INJECTION, SOLUTION INTRAMUSCULAR; INTRAVENOUS; SUBCUTANEOUS
Status: DISCONTINUED | OUTPATIENT
Start: 2024-06-04 | End: 2024-06-06

## 2024-06-04 RX ADMIN — INSULIN ASPART 8 UNITS: 100 INJECTION, SOLUTION INTRAVENOUS; SUBCUTANEOUS at 17:56

## 2024-06-04 RX ADMIN — HYDROMORPHONE HYDROCHLORIDE 1 MG: 1 INJECTION, SOLUTION INTRAMUSCULAR; INTRAVENOUS; SUBCUTANEOUS at 21:45

## 2024-06-04 RX ADMIN — IBUPROFEN 600 MG: 600 TABLET ORAL at 20:03

## 2024-06-04 RX ADMIN — VANCOMYCIN HYDROCHLORIDE 2000 MG: 5 INJECTION, POWDER, LYOPHILIZED, FOR SOLUTION INTRAVENOUS at 13:37

## 2024-06-04 RX ADMIN — SODIUM CHLORIDE 1000 ML: 9 INJECTION, SOLUTION INTRAVENOUS at 14:05

## 2024-06-04 RX ADMIN — FENOFIBRATE 48 MG: 48 TABLET, COATED ORAL at 20:03

## 2024-06-04 RX ADMIN — SODIUM CHLORIDE, POTASSIUM CHLORIDE, SODIUM LACTATE AND CALCIUM CHLORIDE: 600; 310; 30; 20 INJECTION, SOLUTION INTRAVENOUS at 19:20

## 2024-06-04 RX ADMIN — HYDROMORPHONE HYDROCHLORIDE 2 MG: 2 TABLET ORAL at 19:18

## 2024-06-04 RX ADMIN — HYDROMORPHONE HYDROCHLORIDE 1 MG: 1 INJECTION, SOLUTION INTRAMUSCULAR; INTRAVENOUS; SUBCUTANEOUS at 13:37

## 2024-06-04 RX ADMIN — ROSUVASTATIN CALCIUM 20 MG: 10 TABLET, FILM COATED ORAL at 19:15

## 2024-06-04 RX ADMIN — INSULIN GLARGINE 20 UNITS: 100 INJECTION, SOLUTION SUBCUTANEOUS at 21:57

## 2024-06-04 ASSESSMENT — ENCOUNTER SYMPTOMS
COUGH: 0
VOMITING: 0
SHORTNESS OF BREATH: 0
ABDOMINAL PAIN: 0
FEVER: 0
NAUSEA: 0
DIARRHEA: 0

## 2024-06-04 ASSESSMENT — ACTIVITIES OF DAILY LIVING (ADL)
ADLS_ACUITY_SCORE: 35

## 2024-06-04 ASSESSMENT — PAIN SCALES - GENERAL: PAINLEVEL: EXTREME PAIN (8)

## 2024-06-04 NOTE — PHARMACY-VANCOMYCIN DOSING SERVICE
Pharmacy Vancomycin Initial Note  Date of Service 2024  Patient's  1985  39 year old, male    Indication: Skin and Soft Tissue Infection    Current estimated CrCl = Estimated Creatinine Clearance: 208.4 mL/min (A) (based on SCr of 0.55 mg/dL (L)).    Creatinine for last 3 days  No results found for requested labs within last 3 days.    Recent Vancomycin Level(s) for last 3 days  No results found for requested labs within last 3 days.      Vancomycin IV Administrations (past 72 hours)        No vancomycin orders with administrations in past 72 hours.                    Nephrotoxins and other renal medications (From now, onward)      None            Contrast Orders - past 72 hours (72h ago, onward)      None                Plan:  Start vancomycin  2,000 mg IV x 1 in ED.   2.   If vancomycin therapy to continue while patient admitted, please re-consult pharmacy to dose vancomycin.     Thank you for the consult.   Lalita Nickerson, PharmD, BCPS

## 2024-06-04 NOTE — H&P
[facility]    History and Physical - Hospitalist Service       Date of Admission:  6/4/2024    Assessment & Plan      Wolf Gibbs is a 39 year old male admitted on 6/4/2024. He has a history of type 1 diabetes, hypertension, elevated triglycerides and presented to the ER with right leg pain.  He is admitted for right thigh cellulitis and was started on IV vancomycin in the ER.    Right upper leg cellulitis  Issue initially started on Friday 5/31/2024 after placing new Dexcom meter.  Was first seen in the ER on 6/1/2024 and prescribed Keflex.  Next seen in the ER on 6/2/2024 and given dose of ceftriaxone and continued on Keflex.  Saw his PCP for follow-up today who sent him to the ER for further evaluation.  See H&P HPI for further details of ER visits.  Exam today, right upper leg is erythematous, warm, swollen.  He was started on vancomycin in the ER and given Dilaudid with good relief of pain.  Will continue with symptomatic cares and vancomycin for infection treatment.  - IV vancomycin  - PRN Tylenol and ibuprofen for pain, dilaudid for severe breakthrough pain    Type 1 diabetes  Was without insulin for a week up until 5/31/2024.  This was due to insurance/financial barriers.  Normally carb counts and uses 1 unit rapid acting per 10 carbs as well as 25 units basal at bedtime.  Given recent period of time with no insulin, there was some concern for DKA on presentation.  However labs were notable for glucose of 322 and no anion gap present so lower suspicion for this.  Will continue to closely monitor.  - Basal insulin 20 units at bedtime  - Carb counting, 1 unit per 10 carbs  - Medium sliding scale insulin    Hypertension  - PTA losartan-hydrochlorothiazide, pending med rec    Elevated triglycerides  - PTA rosuvastatin and fenofibrate, pending med rec        Diet: Combination Diet Regular Diet Adult  DVT Prophylaxis: Pneumatic Compression Devices  Sotelo Catheter: Not  "present  Fluids:  mL/hr  Lines: None     Cardiac Monitoring: None  Code Status: Full Code    Clinically Significant Risk Factors Present on Admission                  # Hypertension: Noted on problem list            # DMII: A1C = 11.3 % (Ref range: 0.0 - 5.6 %) within past 6 months    # Obesity: Estimated body mass index is 31.34 kg/m  as calculated from the following:    Height as of this encounter: 1.753 m (5' 9\").    Weight as of this encounter: 96.3 kg (212 lb 3.2 oz).              Disposition Plan      Expected Discharge Date: 06/05/2024                The patient's care was discussed with the Attending Physician, Dr. Quiroz .      CELINE HAYES MD  Hospitalist [facility]  Securely message with [employer] (more info)  Text page via [facility] Paging/Directory   ______________________________________________________________________    Chief Complaint   Right thigh cellulitis    History is obtained from the patient    History of Present Illness   Wolf Gibbs is a 39 year old male who has a history of 1 diabetes, hypertension, elevated triglycerides and is admitted for right upper leg cellulitis.    On Friday, 5/31/2024, patient received a new Dexcom monitor (switched from G6 to G7 after getting insurance coverage).  He placed it on his right thigh.  The following morning 6/1/2024, he noticed that it was not reading his blood sugars correctly.  He would sometimes read blood sugars to the 400s followed directly by blood sugars around 40; he knows how he feels when he gets low blood sugars so he knew this was not accurate.  Shortly thereafter, he also started noticing leakage of blood from the area of the Dexcom as well as more irritation so he decided to just remove it.  Thereafter, the pain gradually increased that the day and became quite severe.  This prompted him to seek medical attention at the ER.  He was there for first seen at the ER on the evening of 6/1/2024.  An " ultrasound was used to assess for the possibility that the needle was left behind but it did not identify any abscess or foreign body.  Patient was therefore sent home with prescription for Keflex.  Fortunately, Keflex was sent to the wrong pharmacy so took a while to get it.  He got through 2 doses of the Keflex before pain became severe enough that he needed to again present to the ER.  Therefore he went to the ER again on 6/2/2024.  There, they gave him a dose of ceftriaxone and advised him to continue the Keflex with close PCP follow-up.  He saw his PCP today who sent him to the ER for further evaluation.  In the ER, ER physician performed ultrasound at bedside and also ordered formal ultrasound which showed complex fluid collection but did not appear to have an area that would be very productive for traditional incision and drainage.  Therefore, patient was started on IV antibiotics and admitted to the hospital.  Of note, patient is a type I diabetic and had been out of insulin for a week up until last night.  There is therefore consideration for DKA but given his glucose of 322 and no anion gap on lab workup, lower suspicion for this.    On my interview, patient endorses significant improvement in his pain with the IV pain medication given in the ER.  He again reiterates how painful it had been in previous days noting he could barely sit or sleep given the pain with any bit of pressure was put on his leg.  Also has noticed his leg feels hot and swollen.  Also endorses some chills over the weekend.  Notes overall lately he has had a lot going on with his health and has been pretty frustrated.  He recently stopped taking medications before resuming.  Also just recently decided to reestablish care in [place] instead of his former healthcare home and is working on starting fresh with everything.      Past Medical History    Past Medical History:   Diagnosis Date    Tobacco use disorder 11/13/2010       Past  Surgical History   Past Surgical History:   Procedure Laterality Date    NO PAST SURGERIES         Prior to Admission Medications   Prior to Admission Medications   Prescriptions Last Dose Informant Patient Reported? Taking?   Continuous Glucose Sensor (DEXCOM G7 SENSOR) MISC   No No   Sig: Change every 10 days.   UNABLE TO FIND   Yes No   blood glucose (GLUCOSE METER TEST) test strip   No No   Sig: testing 4-6x/ day   blood glucose (NO BRAND SPECIFIED) test strip   No No   Sig: Instructions: testing 4-6x/ day   blood glucose (NO BRAND SPECIFIED) test strip   No No   Sig: Use to test blood sugar 4 times daily or as directed.   cephALEXin (KEFLEX) 500 MG capsule   Yes No   Sig: Take 500 mg by mouth 4 times daily   fenofibrate (TRICOR) 48 MG tablet   No No   Sig: Take 1 tablet (48 mg) by mouth daily   ibuprofen (ADVIL/MOTRIN) 600 MG tablet   Yes No   Sig: Take 600 mg by mouth every 6 hours as needed   insulin glargine (LANTUS SOLOSTAR) 100 UNIT/ML pen   No No   Sig: Inject 25 Units Subcutaneous at bedtime   insulin lispro (HUMALOG KWIKPEN) 100 UNIT/ML (1 unit dial) KWIKPEN   No No   Sig: Inject 30 Units Subcutaneous 3 times daily (before meals)   insulin pen needle (32G X 4 MM) 32G X 4 MM miscellaneous   No No   Sig: injecting 4-6x/ day   losartan-hydrochlorothiazide (HYZAAR) 50-12.5 MG tablet   No No   Sig: Take 1 tablet by mouth daily   metFORMIN (GLUCOPHAGE) 500 MG tablet   No No   Sig: Take 1 tablet (500 mg) by mouth daily (with breakfast)   oxyCODONE (ROXICODONE) 5 MG tablet   Yes No   Sig: Take 5 mg by mouth every 6 hours as needed for pain   rosuvastatin (CRESTOR) 20 MG tablet   No No   Sig: Take 1 tablet (20 mg) by mouth daily      Facility-Administered Medications: None        Allergies   No Known Allergies     Physical Exam   Vital Signs: Temp: 97.3  F (36.3  C) Temp src: Oral BP: 131/86 Pulse: 89   Resp: 16 SpO2: 97 % O2 Device: None (Room air)    Weight: 212 lbs 3.2 oz    Constitutional: Awake, alert,  cooperative, no apparent distress, and appears stated age. Comfortable appearing, sitting up in bed.  HEENT: Normocephalic, without obvious abnormality, atraumatic. Sclera clear, conjunctiva normal. No rhinorrhea. Moist mucus membranes.  Respiratory: No increased work of breathing, good air exchange, clear to auscultation bilaterally, no crackles or wheezing.  Cardiovascular: Regular rate and rhythm, normal S1 and S2, no S3 or S4, and no murmur.  Skin: Right upper leg with large area of erythema, primarily on lateral thigh. Also swelling present and warmth, tenderness to palpation. No fluctuance. Small, raised wound present in upper lateral thigh consistent with prior Dexcom placement.  Neurologic: Awake, alert, oriented to name, place and time. Normal tone.  Psychiatric: Pleasant mood and affect.      Data     I have personally reviewed the following data over the past 24 hrs:    10.0  \   14.9   / 139 (L)     137 98 13.3 /  322 (H)   4.3 24 0.51 (L) \     Procal: N/A CRP: N/A Lactic Acid: 0.9         Imaging results reviewed over the past 24 hrs:   Recent Results (from the past 24 hour(s))   US Lower Extremity Non Vascular Right    Narrative    EXAM: US LOWER EXTREMITY NON VASCULAR RIGHT  LOCATION: [facility]  DATE: 6/4/2024    INDICATION: cellulitis, eval for abscess  COMPARISON: None.  TECHNIQUE: Routine.    FINDINGS/    Impression    IMPRESSION: Corresponding to the palpable lump is a complex heterogeneous fluid collection measuring 5.1 x 4.2 x 1.1 cm with surrounding hyperemia and subcutaneous edema. Findings suggestive of phlegmon/developing abscess with associated cellulitis. This   is in close proximity to the glucose monitor.

## 2024-06-04 NOTE — ED PROVIDER NOTES
EMERGENCY DEPARTMENT ENCOUNTER      NAME: Wolf Gibbs  AGE: 39 year old male  YOB: 1985  MRN: [id number]  EVALUATION DATE & TIME: No admission date for patient encounter.    PCP: Carlyn Kwon    ED PROVIDER: Haley Kwon M.D.        Chief Complaint   Patient presents with    Cellulitis         FINAL IMPRESSION:    1. Cellulitis of right thigh    2. Phlegmon    3. Hyperglycemia        MEDICAL DECISION MAKING:    Wolf Gibbs is a 39 year old male with history of diabetes, hypertension, elevated triglycerides who presents to the ER with complaints of right thigh cellulitis.     Over the weekend he moved his Dexcom diabetes monitor and has had increased pain, redness, and swelling to this area ever since.  He has been seen twice in ERs with worsening.  Most recent ER visit he was given a dose of IV antibiotic and sent home.  He states that it continues to worsen.    He does show a complex fluid collection in the right thigh but radiologist does not feel that this would likely be very productive for traditional incision and drainage here in the ER.  Agrees with admission for IV antibiotics.  Patient agrees with this plan.    Patient has been accepted to the hospital by the resident service.    ED COURSE:  12:33 PM  I met with the patient to gather history and perform my exam. ED course and treatment discussed. This patient was briefly seen in a private exam room or setting while in the waiting room during ED overcrowding. Patient gave verbal permission to be seen.  Concerns about DKA as well given the fact that he has not been checking his sugars and has not been able to use his insulin.    2:03 PM  US shows complex area on thigh. Spoke with radiologist is not sure that there is actually anything that would be amenable to a traditional incision and drainage.  He thinks it would actually not be very satisfying or productive.  He recommends doing IV antibiotics and send at this time and  reevaluation.    2:19 PM  Updated patient on plan of results.  Does not show signs of DKA at this time.  Will get him admitted to the hospital.  Otherwise hemodynamically stable.    2:37 PM  Patient has been accepted to the hospital by the resident service.  He is hemodynamically stable and will go to [facility].    Do not think it represents vascular compromise.  Seems consistent with cellulitis.  Do not think this represents DKA.  Has a normal anion gap, bicarb, pH.  Will start with IV fluids and can dose insulin after that.    At the conclusion of the encounter I discussed the results of all of the tests and the disposition. Their questions were answered. The patient (and any family present) acknowledged understanding and were agreeable with the care plan.      CONSULTANTS:  Hospitalist - [facility] resident service  Radiologist - Dr. Gross        MEDICATIONS GIVEN IN THE EMERGENCY:  Medications   vancomycin (VANCOCIN) 2,000 mg in 0.9% NaCl 500 mL intermittent infusion (2,000 mg Intravenous $New Bag 6/4/24 1337)   HYDROmorphone (DILAUDID) injection 1 mg (1 mg Intravenous $Given 6/4/24 1337)   sodium chloride 0.9% BOLUS 1,000 mL (1,000 mLs Intravenous $New Bag 6/4/24 1405)         CONDITION:  stable        DISPOSITION:  Med surg as accepted by resident service         =================================================================  =================================================================  TRIAGE ASSESSMENT:  Presents to ER with increasing redness and swelling on right thigh. Per pt report he inserted a dexcom into right thigh on Friday 5/31 and then on Saturday morning pt noticed redness around site.     Pt was seen at [facility]  6/1 given pain med and oral abx.     Symptoms worsened so pt when to [facility] on Sunday 6/2 and given IV antibotic, had an ultrasound done.     Since then area that was marked 6/2 has increased in redness, tenderness and swelling.      Triage Assessment (Adult)       Row Name  "06/04/24 1217          Triage Assessment    Airway WDL WDL        Respiratory WDL    Respiratory WDL WDL        Skin Circulation/Temperature WDL    Skin Circulation/Temperature WDL WDL        Cardiac WDL    Cardiac WDL WDL        Peripheral/Neurovascular WDL    Peripheral Neurovascular WDL pulse assessment     Pulse Assessment popliteal        Pulse Dorsalis Pedis    Left Dorsalis Pedis Pulse 2+ (normal)     Right Dorsalis Pedis Pulse 2+ (normal)        Cognitive/Neuro/Behavioral WDL    Cognitive/Neuro/Behavioral WDL WDL        Pulse Popliteal    Left Popliteal Pulse 2+ (normal)     Right Popliteal Pulse 2+ (normal)                          ED Triage Vitals [06/04/24 1214]   Enc Vitals Group      /84      Pulse 101      Resp 16      Temp 97.3  F (36.3  C)      Temp src Oral      SpO2 99 %      Weight 96.3 kg (212 lb 3.2 oz)      Height 1.753 m (5' 9\")          ================================================================  ================================================================    HPI    Patient information was obtained from: patient    Use of Intrepreter: N/A      Wolf GALINDO Sai is a 39 year old male with history of diabetes, hypertension, elevated triglycerides who presents to the ER with complaints of right thigh cellulitis.    Over the weekend he moved his Dexcom diabetes monitor and has had increased pain, redness, and swelling to this area ever since.  He has been seen twice in ERs with worsening.  Most recent ER visit he was given a dose of IV antibiotic and sent home.  He states that it continues to worsen.    Does admit that he has had some issues with getting his medications due to cost and insurance.  He has not been able to check his sugars lately either because of same.    He denies any true fevers but does admit to not feeling quite well.  Denies any chest pain, cough, shortness of breath, abdominal pain, vomiting or diarrhea.    States the redness continues to worsen and is now " involving his entire right upper thigh.      CHART REVIEW:  ED visit to [facility] 6/2/24:  IMPRESSION and PLAN:  Wolf Gibbs is a 39 y.o. male with 2 days of right thigh pain & redness. Seen at [facility] last night & started on antibiotics but only took 2 doses because script didn't go through. Came due to worsening pain today with low-grade fever. No sign of abscess or necrotizing infection. Mild leukocytosis but normal lactate & BMP. Given toradol & Rocephin in ED & feeling better. Discussed observation but patient comfortable going home. Continue Keflex in AM & return or follow up if worsening or other concerns.    1. Cellulitis of right thigh L03.115 oxyCODONE (ROXICODONE) 5 mg immediate release tablet   ibuprofen (ADVIL; MOTRIN) 600 mg tablet     Fercho Bauman MD  6/2/2024   [facility]       REVIEW OF SYSTEMS  Review of Systems   Constitutional:  Negative for fever.   Respiratory:  Negative for cough and shortness of breath.    Cardiovascular:  Negative for chest pain.   Gastrointestinal:  Negative for abdominal pain, diarrhea, nausea and vomiting.   Skin:  Positive for rash.   All other systems reviewed and are negative.        PAST MEDICAL HISTORY:  Past Medical History:   Diagnosis Date    Tobacco use disorder 11/13/2010         PAST SURGICAL HISTORY:  Past Surgical History:   Procedure Laterality Date    NO PAST SURGERIES           CURRENT MEDICATIONS:    Prior to Admission medications    Medication Sig Start Date End Date Taking? Authorizing Provider   blood glucose (GLUCOSE METER TEST) test strip testing 4-6x/ day 4/12/23   Tavares Jarquin MD   blood glucose (NO BRAND SPECIFIED) test strip Use to test blood sugar 4 times daily or as directed. 4/16/24   Carlyn Kwon CNP   blood glucose (NO BRAND SPECIFIED) test strip Instructions: testing 4-6x/ day 4/12/23   Tavares Jarquin MD   cephALEXin (KEFLEX) 500 MG capsule Take 500 mg by mouth 4 times daily 6/2/24 6/9/24  Reported,  Patient   Continuous Glucose Sensor (DEXCOM G7 SENSOR) MISC Change every 10 days. 5/29/24   Lalita Bermeo PA-C   fenofibrate (TRICOR) 48 MG tablet Take 1 tablet (48 mg) by mouth daily 5/2/24   Tisha Florian MD   ibuprofen (ADVIL/MOTRIN) 600 MG tablet Take 600 mg by mouth every 6 hours as needed    Reported, Patient   insulin glargine (LANTUS SOLOSTAR) 100 UNIT/ML pen Inject 25 Units Subcutaneous at bedtime 5/29/24   Lalita Bermeo PA-C   insulin lispro (HUMALOG KWIKPEN) 100 UNIT/ML (1 unit dial) KWIKPEN Inject 30 Units Subcutaneous 3 times daily (before meals) 6/3/24   Lalita Bermeo PA-C   insulin pen needle (32G X 4 MM) 32G X 4 MM miscellaneous injecting 4-6x/ day 5/29/24   Lalita Bermeo PA-C   losartan-hydrochlorothiazide (HYZAAR) 50-12.5 MG tablet Take 1 tablet by mouth daily 4/16/24   Carlyn Kwon CNP   metFORMIN (GLUCOPHAGE) 500 MG tablet Take 1 tablet (500 mg) by mouth daily (with breakfast) 4/16/24   Carlyn Kwon CNP   oxyCODONE (ROXICODONE) 5 MG tablet Take 5 mg by mouth every 6 hours as needed for pain    Reported, Patient   rosuvastatin (CRESTOR) 20 MG tablet Take 1 tablet (20 mg) by mouth daily 4/16/24   Carlyn Kwon CNP   UNABLE TO FIND  2/19/21   Reported, Patient         ALLERGIES:  No Known Allergies      FAMILY HISTORY:  Family History   Problem Relation Age of Onset    Hypertension Mother     Hyperthyroidism Mother     Alcoholism Father     Depression Father     Diabetes Type 1 Sister     Down Syndrome Sister     No Known Problems Brother     Myocardial Infarction Paternal Grandfather 48         SOCIAL HISTORY:  Social History     Socioeconomic History    Marital status: Single   Tobacco Use    Smoking status: Former     Current packs/day: 1.00     Types: Cigarettes     Passive exposure: Past    Smokeless tobacco: Never   Vaping Use    Vaping status: Former    Substances: Nicotine   Substance and Sexual Activity    Alcohol use: Not Currently     Social Determinants of Health  "    Financial Resource Strain: Low Risk  (9/22/2023)    Financial Resource Strain     Within the past 12 months, have you or your family members you live with been unable to get utilities (heat, electricity) when it was really needed?: No   Food Insecurity: Low Risk  (9/22/2023)    Food Insecurity     Within the past 12 months, did you worry that your food would run out before you got money to buy more?: No     Within the past 12 months, did the food you bought just not last and you didn t have money to get more?: No   Transportation Needs: Low Risk  (9/22/2023)    Transportation Needs     Within the past 12 months, has lack of transportation kept you from medical appointments, getting your medicines, non-medical meetings or appointments, work, or from getting things that you need?: No    Received from [facility], [facility]    Social Connections   Interpersonal Safety: Low Risk  (4/16/2024)    Interpersonal Safety     Do you feel physically and emotionally safe where you currently live?: Yes     Within the past 12 months, have you been hit, slapped, kicked or otherwise physically hurt by someone?: No     Within the past 12 months, have you been humiliated or emotionally abused in other ways by your partner or ex-partner?: No   Housing Stability: Low Risk  (9/22/2023)    Housing Stability     Do you have housing? : Yes     Are you worried about losing your housing?: No         VITALS:  Patient Vitals for the past 24 hrs:   BP Temp Temp src Pulse Resp SpO2 Height Weight   06/04/24 1400 (!) 142/96 -- -- 102 -- 93 % -- --   06/04/24 1214 127/84 97.3  F (36.3  C) Oral 101 16 99 % 1.753 m (5' 9\") 96.3 kg (212 lb 3.2 oz)       Wt Readings from Last 3 Encounters:   06/04/24 96.3 kg (212 lb 3.2 oz)   06/04/24 96.3 kg (212 lb 4.8 oz)   05/02/24 99.8 kg (220 lb)       Estimated Creatinine Clearance: 224.7 mL/min (A) (based on SCr of 0.51 mg/dL " (L)).    PHYSICAL EXAM    Constitutional:  Well developed, Well nourished, NAD  HENT:  Normocephalic, Atraumatic, Bilateral external ears normal, Nose normal. Neck- Supple, No stridor.   Eyes:  PERRL, EOMI, Conjunctiva normal, No discharge.  Respiratory:  Normal breath sounds, No respiratory distress, No wheezing, Speaks full sentences easily. No cough.   Cardiovascular:  Normal heart rate, Regular rhythm, No murmurs, No rubs, No gallops.   GI:  No excessive obesity.  Bowel sounds normal, Soft, No tenderness, No masses, No flank tenderness. No rebound or guarding.   : deferred  Musculoskeletal: No cyanosis, No clubbing. Good range of motion in all major joints. No major deformities noted.   Integument:  Warm, Dry, + large area of erythema to the right thigh.  There is a wound defect in the central area of this consistent with his prior Dexcom, No fluctuance, but exquisitely tender throughout  Neurologic:  Alert & oriented x 3  Psychiatric:  Affect normal, Cooperative     Photo taken 6/4/24 at ~12:40pm      LAB:  All pertinent labs reviewed and interpreted.  Recent Results (from the past 24 hour(s))   Ketone Beta-Hydroxybutyrate Quantitative    Collection Time: 06/04/24  1:21 PM   Result Value Ref Range    Ketone (Beta-Hydroxybutyrate) Quantitative 0.80 (H) <=0.30 mmol/L   Basic metabolic panel    Collection Time: 06/04/24  1:21 PM   Result Value Ref Range    Sodium 137 135 - 145 mmol/L    Potassium 4.3 3.4 - 5.3 mmol/L    Chloride 98 98 - 107 mmol/L    Carbon Dioxide (CO2) 24 22 - 29 mmol/L    Anion Gap 15 7 - 15 mmol/L    Urea Nitrogen 13.3 6.0 - 20.0 mg/dL    Creatinine 0.51 (L) 0.67 - 1.17 mg/dL    GFR Estimate >90 >60 mL/min/1.73m2    Calcium 9.7 8.6 - 10.0 mg/dL    Glucose 322 (H) 70 - 99 mg/dL   Lactic Acid Whole Blood with 1X Repeat in 2 HR when >2    Collection Time: 06/04/24  1:21 PM   Result Value Ref Range    Lactic Acid, Initial 0.9 0.7 - 2.0 mmol/L   CBC with platelets and differential    Collection  "Time: 06/04/24  1:21 PM   Result Value Ref Range    WBC Count 10.0 4.0 - 11.0 10e3/uL    RBC Count 4.81 4.40 - 5.90 10e6/uL    Hemoglobin 14.9 13.3 - 17.7 g/dL    Hematocrit 41.0 40.0 - 53.0 %    MCV 85 78 - 100 fL    MCH 31.0 26.5 - 33.0 pg    MCHC 36.3 31.5 - 36.5 g/dL    RDW 12.1 10.0 - 15.0 %    Platelet Count 139 (L) 150 - 450 10e3/uL    % Neutrophils 78 %    % Lymphocytes 14 %    % Monocytes 5 %    % Eosinophils 3 %    % Basophils 0 %    % Immature Granulocytes 0 %    NRBCs per 100 WBC 0 <1 /100    Absolute Neutrophils 7.8 1.6 - 8.3 10e3/uL    Absolute Lymphocytes 1.4 0.8 - 5.3 10e3/uL    Absolute Monocytes 0.5 0.0 - 1.3 10e3/uL    Absolute Eosinophils 0.3 0.0 - 0.7 10e3/uL    Absolute Basophils 0.0 0.0 - 0.2 10e3/uL    Absolute Immature Granulocytes 0.0 <=0.4 10e3/uL    Absolute NRBCs 0.0 10e3/uL   Blood gas venous    Collection Time: 06/04/24  1:37 PM   Result Value Ref Range    pH Venous 7.43 7.32 - 7.43    pCO2 Venous 43 40 - 50 mm Hg    pO2 Venous 35 25 - 47 mm Hg    Bicarbonate Venous 28 21 - 28 mmol/L    Base Excess/Deficit Venous 3.4 (H) -3.0 - 3.0 mmol/L    FIO2 21     Oxyhemoglobin Venous 66 (L) 70 - 75 %    O2 Sat, Venous 66.7 (L) 70.0 - 75.0 %       No results found for: \"ABORH\"        RADIOLOGY:  Reviewed all pertinent imaging. Please see official radiology report.    US Lower Extremity Non Vascular Right   Final Result   IMPRESSION: Corresponding to the palpable lump is a complex heterogeneous fluid collection measuring 5.1 x 4.2 x 1.1 cm with surrounding hyperemia and subcutaneous edema. Findings suggestive of phlegmon/developing abscess with associated cellulitis. This    is in close proximity to the glucose monitor.            EKG:    none      PROCEDURES:  none      Medical Decision Making  Obtained supplemental history:Supplemental history obtained?: No  Reviewed external records: External records reviewed?: Documented in chart and Outpatient Record: see HPI  Care impacted by chronic " illness:Diabetes  Care significantly affected by social determinants of health:Access to Medical Care and Access to Affordable Health Care  Did you consider but not order tests?: Work up considered but not performed and documented in chart, if applicable  Did you interpret images independently?: Independent interpretation of ECG and images noted in documentation, when applicable.  Consultation discussion with other provider:Did you involve another provider (consultant, , pharmacy, etc.)?: I discussed the care with another health care provider, see documentation for details.  Admit.      Haley Kwon M.D. [facility]  Emergency Medicine and Medical Toxicology  Formerly [facility] EMERGENCY ROOM  [address] [id number] [phone]  Dept: [phone]           Haley Kwon MD  06/04/24 7579

## 2024-06-04 NOTE — LETTER
[facility] [address] [address]  Phone: [phone]  Fax: [phone]    June 6, 2024        Wolf Gibbs  [address] [place], [place] [address] [address] [address]          To whom it may concern:    RE: Wolf Gibbs    Patient received medical care from 6/4/2024-6/6/2024, please excuse him from work. Patient may return to work on 6/10/2024 with the following:  No working or lifting restrictions    Please contact me for questions or concerns.      Sincerely,      Prudence Turner, DO

## 2024-06-04 NOTE — ED TRIAGE NOTES
Presents to ER with increasing redness and swelling on right thigh. Per pt report he inserted a dexcom into right thigh on Friday 5/31 and then on Saturday morning pt noticed redness around site.     Pt was seen at [place]  6/1 given pain med and oral abx.     Symptoms worsened so pt when to [facility] on Sunday 6/2 and given IV antibotic, had an ultrasound done.     Since then area that was marked 6/2 has increased in redness, tenderness and swelling.      Triage Assessment (Adult)       Row Name 06/04/24 1217          Triage Assessment    Airway WDL WDL        Respiratory WDL    Respiratory WDL WDL        Skin Circulation/Temperature WDL    Skin Circulation/Temperature WDL WDL        Cardiac WDL    Cardiac WDL WDL        Peripheral/Neurovascular WDL    Peripheral Neurovascular WDL pulse assessment     Pulse Assessment popliteal        Pulse Dorsalis Pedis    Left Dorsalis Pedis Pulse 2+ (normal)     Right Dorsalis Pedis Pulse 2+ (normal)        Cognitive/Neuro/Behavioral WDL    Cognitive/Neuro/Behavioral WDL WDL        Pulse Popliteal    Left Popliteal Pulse 2+ (normal)     Right Popliteal Pulse 2+ (normal)

## 2024-06-04 NOTE — PROGRESS NOTES
"    Stewart Bloom is a 39 year old, presenting for the following health issues:  Hospital F/U ([facility] on 06/01/2024 - IV Antibiotic x1 time then worse 06/02/2024. [place] [facility] . Right lateral thigh near Glucose monitoring device,  Cellulitis. Started Keflex QID x 7 days and Oxycodone for pain 06/03/2024 from [facility] ER.)      6/4/2024    10:06 AM   Additional Questions   [facility]     Via the Health Maintenance questionnaire, the patient has reported the following services have been completed -Eye Exam: [facility] 2024-04-01, this information has been sent to the abstraction team.  HPI     Here for follow-up cellulitis.  Was seen in ER 6/1/ & 6/2/24.  Infection started 6/1/24 and continues to worsen.  Severe pain,  Running low-grade fever, body aches.  Is taking Keflex 4x/day.  Hasn't been able to use Dexcom for past few days- infection started at Dexcom insertion site.         Objective    /82 (BP Location: Left arm, Patient Position: Sitting, Cuff Size: Adult Regular)   Pulse 105   Temp 98.9  F (37.2  C) (Oral)   Resp 18   Ht 1.753 m (5' 9\")   Wt 96.3 kg (212 lb 4.8 oz)   SpO2 97%   BMI 31.35 kg/m    Body mass index is 31.35 kg/m .  Physical Exam   GENERAL: alert and appears to be in significant pain  RESP: respirations even, non-labored   MS: right leg- edema  SKIN: large area of cellulitis right thigh  PSYCH: mentation appears normal, affect normal/bright    A/P:  1. Cellulitis of right lower extremity  Worsening cellulitis in patient with uncontrolled diabetes.  Advised to go to ER for further evaluation and treatment- likely needs IV antibiotics.           Signed Electronically by: Carlyn Kwon CNP    "

## 2024-06-04 NOTE — PHARMACY-VANCOMYCIN DOSING SERVICE
Pharmacy Vancomycin Initial Note  Date of Service 2024  Patient's  1985  39 year old, male    Indication: Skin and Soft Tissue Infection    Current estimated CrCl = Estimated Creatinine Clearance: 224.7 mL/min (A) (based on SCr of 0.51 mg/dL (L)).    Creatinine for last 3 days  2024:  1:21 PM Creatinine 0.51 mg/dL    Recent Vancomycin Level(s) for last 3 days  No results found for requested labs within last 3 days.      Vancomycin IV Administrations (past 72 hours)                     vancomycin (VANCOCIN) 2,000 mg in 0.9% NaCl 500 mL intermittent infusion (mg) 2,000 mg New Bag 24 1337                    Nephrotoxins and other renal medications (From now, onward)      Start     Dose/Rate Route Frequency Ordered Stop    24 0130  vancomycin (VANCOCIN) 1,500 mg in 0.9% NaCl 250 mL intermittent infusion         1,500 mg  over 90 Minutes Intravenous EVERY 12 HOURS 24 1622      24 1513  ibuprofen (ADVIL/MOTRIN) tablet 600 mg         600 mg Oral EVERY 6 HOURS PRN 24 1514              Contrast Orders - past 72 hours (72h ago, onward)      None            [employer] Prediction of Planned Initial Vancomycin Regimen  Loading dose: N/A  Regimen: 1500 mg IV every 12 hours.  Start time: 01:37 on 2024  Exposure target: AUC24 (range)400-600 mg/L.hr   AUC24,ss: 553 mg/L.hr  Probability of AUC24 > 400: 80 %  Ctrough,ss: 16.7 mg/L  Probability of Ctrough,ss > 20: 37 %  Probability of nephrotoxicity (Lodise JOVANI ): 12 %          Plan:  Following 2g load, continue vancomycin  1500 mg IV q12h.   Vancomycin monitoring method: AUC  Vancomycin therapeutic monitoring goal: 400-600 mg*h/L  Pharmacy will check vancomycin levels as appropriate in 1-3 Days.    Serum creatinine levels will be ordered daily for the first week of therapy and at least twice weekly for subsequent weeks.      Candida Manzanares, [facility]

## 2024-06-04 NOTE — MEDICATION SCRIBE - ADMISSION MEDICATION HISTORY
Medication Scribe Admission Medication History    Admission medication history is complete. The information provided in this note is only as accurate as the sources available at the time of the update.    Information Source(s): Patient and CareEverywhere/SureScripts via in-person    Pertinent Information: Had 15 units of insulin lispro around 1100 today. No oxycodone since last night.     Cephalexin: Sat 1 dose, Sun 2 dose, Mon 4 dose, Tue (today) 1 dose around 0900.     Changes made to PTA medication list:  Added:   Multivitamin  Fish oil omega-3  Unable to find - sea calles, lion's georgiana mushroom  Deleted: None  Changed: None    Allergies reviewed with patient and updates made in EHR: yes    Medication History Completed By: Gagandeep Lyons 6/4/2024 3:46 PM    PTA Med List   Medication Sig Last Dose    cephALEXin (KEFLEX) 500 MG capsule Take 500 mg by mouth 4 times daily 6/4/2024 at 0900 (1 dose total today)    fenofibrate (TRICOR) 48 MG tablet Take 1 tablet (48 mg) by mouth daily 6/3/2024 at 1200    fish oil-omega-3 fatty acids 1000 MG capsule Take 1 g by mouth daily 6/3/2024 at 1200    ibuprofen (ADVIL/MOTRIN) 600 MG tablet Take 600 mg by mouth every 6 hours as needed 6/4/2024 at 0900    insulin glargine (LANTUS SOLOSTAR) 100 UNIT/ML pen Inject 25 Units Subcutaneous at bedtime 6/3/2024 at HS; 25 units    insulin lispro (HUMALOG KWIKPEN) 100 UNIT/ML (1 unit dial) KWIKPEN Inject 30 Units Subcutaneous 3 times daily (before meals) 6/4/2024 at 1100; 15 units    losartan-hydrochlorothiazide (HYZAAR) 50-12.5 MG tablet Take 1 tablet by mouth daily 6/3/2024 at 1200    metFORMIN (GLUCOPHAGE) 500 MG tablet Take 1 tablet (500 mg) by mouth daily (with breakfast) 6/3/2024 at 1200    multivitamin, therapeutic (THERA-VIT) TABS tablet Take 1 tablet by mouth daily 6/3/2024 at 1200    oxyCODONE (ROXICODONE) 5 MG tablet Take 5 mg by mouth every 6 hours as needed for pain 6/3/2024 at HS; 5 mg    rosuvastatin (CRESTOR) 20 MG tablet  Take 1 tablet (20 mg) by mouth daily 6/3/2024 at 1200    UNABLE TO FIND Sea calles supplement 1 every day     Lion's georgiana mushroom supplement 1 every day 6/3/2024 at 1200

## 2024-06-05 ENCOUNTER — APPOINTMENT (OUTPATIENT)
Dept: ULTRASOUND IMAGING | Facility: CLINIC | Age: 39
End: 2024-06-05
Payer: COMMERCIAL

## 2024-06-05 LAB
ANION GAP SERPL CALCULATED.3IONS-SCNC: 9 MMOL/L (ref 7–15)
BUN SERPL-MCNC: 11 MG/DL (ref 6–20)
CALCIUM SERPL-MCNC: 9 MG/DL (ref 8.6–10)
CHLORIDE SERPL-SCNC: 102 MMOL/L (ref 98–107)
CREAT SERPL-MCNC: 0.44 MG/DL (ref 0.67–1.17)
DEPRECATED HCO3 PLAS-SCNC: 27 MMOL/L (ref 22–29)
EGFRCR SERPLBLD CKD-EPI 2021: >90 ML/MIN/1.73M2
ERYTHROCYTE [DISTWIDTH] IN BLOOD BY AUTOMATED COUNT: 12.2 % (ref 10–15)
GLUCOSE BLDC GLUCOMTR-MCNC: 271 MG/DL (ref 70–99)
GLUCOSE BLDC GLUCOMTR-MCNC: 273 MG/DL (ref 70–99)
GLUCOSE BLDC GLUCOMTR-MCNC: 282 MG/DL (ref 70–99)
GLUCOSE BLDC GLUCOMTR-MCNC: 294 MG/DL (ref 70–99)
GLUCOSE BLDC GLUCOMTR-MCNC: 310 MG/DL (ref 70–99)
GLUCOSE BLDC GLUCOMTR-MCNC: 321 MG/DL (ref 70–99)
GLUCOSE SERPL-MCNC: 265 MG/DL (ref 70–99)
HCT VFR BLD AUTO: 36 % (ref 40–53)
HGB BLD-MCNC: 12.7 G/DL (ref 13.3–17.7)
MCH RBC QN AUTO: 30.5 PG (ref 26.5–33)
MCHC RBC AUTO-ENTMCNC: 35.3 G/DL (ref 31.5–36.5)
MCV RBC AUTO: 86 FL (ref 78–100)
PLATELET # BLD AUTO: 138 10E3/UL (ref 150–450)
POTASSIUM SERPL-SCNC: 3.9 MMOL/L (ref 3.4–5.3)
RBC # BLD AUTO: 4.17 10E6/UL (ref 4.4–5.9)
SODIUM SERPL-SCNC: 138 MMOL/L (ref 135–145)
WBC # BLD AUTO: 6.7 10E3/UL (ref 4–11)

## 2024-06-05 PROCEDURE — 120N000001 HC R&B MED SURG/OB

## 2024-06-05 PROCEDURE — 258N000003 HC RX IP 258 OP 636

## 2024-06-05 PROCEDURE — 85027 COMPLETE CBC AUTOMATED: CPT

## 2024-06-05 PROCEDURE — 10060 I&D ABSCESS SIMPLE/SINGLE: CPT | Mod: GC

## 2024-06-05 PROCEDURE — 0Y9C0ZZ DRAINAGE OF RIGHT UPPER LEG, OPEN APPROACH: ICD-10-PCS | Performed by: STUDENT IN AN ORGANIZED HEALTH CARE EDUCATION/TRAINING PROGRAM

## 2024-06-05 PROCEDURE — 87075 CULTR BACTERIA EXCEPT BLOOD: CPT

## 2024-06-05 PROCEDURE — 250N000011 HC RX IP 250 OP 636

## 2024-06-05 PROCEDURE — G0378 HOSPITAL OBSERVATION PER HR: HCPCS

## 2024-06-05 PROCEDURE — 99233 SBSQ HOSP IP/OBS HIGH 50: CPT | Mod: 25

## 2024-06-05 PROCEDURE — 96375 TX/PRO/DX INJ NEW DRUG ADDON: CPT

## 2024-06-05 PROCEDURE — 250N000013 HC RX MED GY IP 250 OP 250 PS 637

## 2024-06-05 PROCEDURE — 76882 US LMTD JT/FCL EVL NVASC XTR: CPT | Mod: RT

## 2024-06-05 PROCEDURE — 96361 HYDRATE IV INFUSION ADD-ON: CPT

## 2024-06-05 PROCEDURE — 82962 GLUCOSE BLOOD TEST: CPT

## 2024-06-05 PROCEDURE — 36415 COLL VENOUS BLD VENIPUNCTURE: CPT

## 2024-06-05 PROCEDURE — 96376 TX/PRO/DX INJ SAME DRUG ADON: CPT

## 2024-06-05 PROCEDURE — 80048 BASIC METABOLIC PNL TOTAL CA: CPT

## 2024-06-05 PROCEDURE — 87070 CULTURE OTHR SPECIMN AEROBIC: CPT

## 2024-06-05 PROCEDURE — 87186 SC STD MICRODIL/AGAR DIL: CPT

## 2024-06-05 RX ADMIN — FENOFIBRATE 48 MG: 48 TABLET, COATED ORAL at 08:09

## 2024-06-05 RX ADMIN — INSULIN ASPART 6 UNITS: 100 INJECTION, SOLUTION INTRAVENOUS; SUBCUTANEOUS at 18:45

## 2024-06-05 RX ADMIN — KETOROLAC TROMETHAMINE 15 MG: 15 INJECTION, SOLUTION INTRAMUSCULAR; INTRAVENOUS at 23:57

## 2024-06-05 RX ADMIN — METFORMIN HYDROCHLORIDE 500 MG: 500 TABLET, FILM COATED ORAL at 08:09

## 2024-06-05 RX ADMIN — HYDROMORPHONE HYDROCHLORIDE 1 MG: 1 INJECTION, SOLUTION INTRAMUSCULAR; INTRAVENOUS; SUBCUTANEOUS at 14:06

## 2024-06-05 RX ADMIN — HYDROMORPHONE HYDROCHLORIDE 1 MG: 1 INJECTION, SOLUTION INTRAMUSCULAR; INTRAVENOUS; SUBCUTANEOUS at 04:24

## 2024-06-05 RX ADMIN — ROSUVASTATIN CALCIUM 20 MG: 10 TABLET, FILM COATED ORAL at 08:09

## 2024-06-05 RX ADMIN — SODIUM CHLORIDE, POTASSIUM CHLORIDE, SODIUM LACTATE AND CALCIUM CHLORIDE: 600; 310; 30; 20 INJECTION, SOLUTION INTRAVENOUS at 04:35

## 2024-06-05 RX ADMIN — VANCOMYCIN HYDROCHLORIDE 1500 MG: 5 INJECTION, POWDER, LYOPHILIZED, FOR SOLUTION INTRAVENOUS at 00:58

## 2024-06-05 RX ADMIN — KETOROLAC TROMETHAMINE 15 MG: 15 INJECTION, SOLUTION INTRAMUSCULAR; INTRAVENOUS at 18:40

## 2024-06-05 RX ADMIN — HYDROMORPHONE HYDROCHLORIDE 1 MG: 1 INJECTION, SOLUTION INTRAMUSCULAR; INTRAVENOUS; SUBCUTANEOUS at 08:08

## 2024-06-05 RX ADMIN — HYDROMORPHONE HYDROCHLORIDE 1 MG: 1 INJECTION, SOLUTION INTRAMUSCULAR; INTRAVENOUS; SUBCUTANEOUS at 01:12

## 2024-06-05 RX ADMIN — HYDROMORPHONE HYDROCHLORIDE 1 MG: 1 INJECTION, SOLUTION INTRAMUSCULAR; INTRAVENOUS; SUBCUTANEOUS at 20:47

## 2024-06-05 RX ADMIN — HYDROMORPHONE HYDROCHLORIDE 1 MG: 1 INJECTION, SOLUTION INTRAMUSCULAR; INTRAVENOUS; SUBCUTANEOUS at 11:07

## 2024-06-05 RX ADMIN — VANCOMYCIN HYDROCHLORIDE 1500 MG: 5 INJECTION, POWDER, LYOPHILIZED, FOR SOLUTION INTRAVENOUS at 14:34

## 2024-06-05 RX ADMIN — HYDROMORPHONE HYDROCHLORIDE 1 MG: 1 INJECTION, SOLUTION INTRAMUSCULAR; INTRAVENOUS; SUBCUTANEOUS at 17:09

## 2024-06-05 RX ADMIN — LOSARTAN POTASSIUM AND HYDROCHLOROTHIAZIDE 1 TABLET: 50; 12.5 TABLET, FILM COATED ORAL at 08:09

## 2024-06-05 RX ADMIN — KETOROLAC TROMETHAMINE 15 MG: 15 INJECTION, SOLUTION INTRAMUSCULAR; INTRAVENOUS at 12:28

## 2024-06-05 ASSESSMENT — ACTIVITIES OF DAILY LIVING (ADL)
ADLS_ACUITY_SCORE: 18
ADLS_ACUITY_SCORE: 18
ADLS_ACUITY_SCORE: 35
ADLS_ACUITY_SCORE: 18
ADLS_ACUITY_SCORE: 35
ADLS_ACUITY_SCORE: 18
ADLS_ACUITY_SCORE: 35
ADLS_ACUITY_SCORE: 35
ADLS_ACUITY_SCORE: 18

## 2024-06-05 NOTE — PROGRESS NOTES
BRIEF PROGRESS NOTE    Paged by RN that patient was frustrated and wanted to start process to leave A.    Patient has hallway bed and is frustrated that he is spending thousands in insurance costs and is not in a room, is instead surrounded by curtains and hears all the sounds of the hospital and is without privacy. Has a history of PTSD and feels that these surroundings force him to be hypervigilant.     Reflected on these frustrations with him, discussed that we are concerned for his leg and feel that he needs IV antibiotics given that his trial of oral Keflex appears to have failed.  He voiced understanding.  Additionally, he is having difficulty with pain control and does not feel that oral Dilaudid is helping him at all, still endorses 8/10 pain.  Discussed that I will put IV Toradol as well as IV Dilaudid for severe pain that he can use overnight.  Discussed with him that I will discuss with charge nurse his rooming situation but cannot guarantee that he will get a room as all rooms are currently full.  He voiced understanding.  He would like to stay and will not be leaving [place].  He apologized for his frustration to his RN and kindly thanked us for listening to his concerns.      A/P:  Patient will stay admitted, will attempt to get him a room as soon as possible however unlikely that this will happen overnight given that all rooms are occupied.    Placed order for IV Dilaudid 0.5-1 mg every 3 hours as needed and IV Toradol 15 mg every 6 hours    Placed order hydroxyzine 50 mg as needed for anxiety or insomnia.    Instructed patient's nurse to call with any other questions or concerns.    Rashid Lynch, DO

## 2024-06-05 NOTE — PROGRESS NOTES
[facility]    Progress Note - Hospitalist Service       Date of Admission:  6/4/2024    Assessment & Plan   Wolf Gibbs is a 39 year old male admitted on 6/4/2024. He has a history of type 1 diabetes, hypertension, elevated triglycerides and presented to the ER with right leg pain.  He is admitted for right thigh cellulitis and was started on IV vancomycin in the ER.  He remains hospitalized for IV antibiotics and pain control.  Plan today to repeat RLE US to see if abscess more consolidated and drainable; if so, plan for I&D for further source control and pain control of infection.    Right upper leg cellulitis  Issue initially started on Friday 5/31/2024 after placing new Dexcom meter.  Was first seen in the ER on 6/1/2024 and prescribed Keflex.  Next seen in the ER on 6/2/2024 and given dose of ceftriaxone and continued on Keflex.  Saw his PCP for follow-up on 6/5/24 who sent him to the ER for further evaluation.  See H&P HPI for further details of ER visits.  On exam on admission, right upper leg was erythematous, warm, swollen.  He was started on vancomycin in the ER and given Dilaudid with good relief of pain.  Will continue with symptomatic cares and vancomycin for infection treatment.  On exam today, erythema and edema are improving but warmth and tenderness remain similar to previous.  - IV vancomycin  - PRN Tylenol and ibuprofen for pain, dilaudid for severe breakthrough pain     Type 1 diabetes  Was without insulin for a week up until 5/31/2024.  This was due to insurance/financial barriers.  Normally carb counts and uses 1 unit rapid acting per 10 carbs as well as 25 units basal at bedtime.  Given recent period of time with no insulin, there was some concern for DKA on presentation.  However labs were notable for glucose of 322 and no anion gap present so lower suspicion for this.  Will continue to closely monitor.  - increase basal insulin to PTA dosage of 25 units at  "bedtime  - Carb counting, 1 unit per 10 carbs  - Medium sliding scale insulin     Hypertension  - PTA losartan-hydrochlorothiazide     Elevated triglycerides  - PTA rosuvastatin and fenofibrate        Diet: Combination Diet Regular Diet Adult    DVT Prophylaxis: Pneumatic Compression Devices  Sotelo Catheter: Not present  Fluids: PO  Lines: None     Cardiac Monitoring: None  Code Status: Full Code      Clinically Significant Risk Factors Present on Admission                  # Hypertension: Noted on problem list            # DMII: A1C = 11.3 % (Ref range: 0.0 - 5.6 %) within past 6 months    # Obesity: Estimated body mass index is 31.82 kg/m  as calculated from the following:    Height as of this encounter: 1.753 m (5' 9\").    Weight as of this encounter: 97.8 kg (215 lb 8 oz).              Disposition Plan      Expected Discharge Date: 06/07/2024                The patient's care was discussed with the Attending Physician, Dr. Quiroz .    CELINE HAYES MD  Hospitalist Service  [facility]  Securely message with [employer] (more info)  Text page via [employer] Paging/Directory   ______________________________________________________________________    Interval History   No acute events overnight.  Nursing notes reviewed.  Patient understandably frustrated by assigned hallway bed but hopeful to transfer into a room today.  He reports pain as 5 or 6 out of 10 after dilaudid administration, but relief only lasts about an hour.  Thinks redness of leg is improving.  No other new concerns.    Physical Exam   Vital Signs: Temp: 98.2  F (36.8  C) Temp src: Oral BP: 130/86 Pulse: 89   Resp: 16 SpO2: 96 % O2 Device: None (Room air)    Weight: 215 lbs 8 oz    Constitutional: Awake, alert, cooperative, no apparent distress, and appears stated age. Comfortable appearing, sitting up on the side of the bed.  HEENT: Normocephalic, without obvious abnormality, atraumatic. Sclera clear, conjunctiva normal. No " rhinorrhea. Moist mucus membranes.  Respiratory: No increased work of breathing, good air exchange, clear to auscultation bilaterally, no crackles or wheezing.  Cardiovascular: Regular rate and rhythm, normal S1 and S2, no S3 or S4, and no murmur.  Skin: Right upper leg with large but improving area of erythema, primarily on lateral thigh. Also swelling present and warmth, tenderness to palpation. No fluctuance. Small, raised wound present in upper lateral thigh consistent with prior Dexcom placement. Area of infection appears more consolidated than yesterday.  Neurologic: Awake, alert, oriented to name, place and time. Normal tone.  Psychiatric: Pleasant mood and affect.      Data     I have personally reviewed the following data over the past 24 hrs:    6.7  \   12.7 (L)   / 138 (L)     138 102 11.0 /  294 (H)   3.9 27 0.44 (L) \     Procal: N/A CRP: N/A Lactic Acid: 0.9         Imaging results reviewed over the past 24 hrs:   Recent Results (from the past 24 hour(s))   US Lower Extremity Non Vascular Right    Narrative    EXAM: US LOWER EXTREMITY NON VASCULAR RIGHT  LOCATION: [facility]  DATE: 6/4/2024    INDICATION: cellulitis, eval for abscess  COMPARISON: None.  TECHNIQUE: Routine.    FINDINGS/    Impression    IMPRESSION: Corresponding to the palpable lump is a complex heterogeneous fluid collection measuring 5.1 x 4.2 x 1.1 cm with surrounding hyperemia and subcutaneous edema. Findings suggestive of phlegmon/developing abscess with associated cellulitis. This   is in close proximity to the glucose monitor.

## 2024-06-05 NOTE — CARE PLAN
Pt arrived to floor @2030 and was very unhappy to be in hallway bed, pt is requesting to start process for leaving AMA. Pt states he is paying thousands of dollars to stay in the hospital and that bed w/curtains is not ok, he just called his mother while i was attempting to do assessment, and he is planning to leave to [facility]. RN unable to do full assessment and paged BFM provider to update. BFM provider arrived to floor and was able to calm pt down, pt decided to stay for tx. Pt is aware no rooms are available at this time but will be moved to room as soon as availability permits. IV dilaudid rx'd and administered for 8/10 pain, which help improve pain down to 3/10 pain. Pt denies n/v, dizziness, sob, chest pain or any new sx. Pt does have ranjeet on RLE where erythema is noted. IVF w/LR running @125 ml/hr.

## 2024-06-05 NOTE — PLAN OF CARE
Goal Outcome Evaluation:      Plan of Care Reviewed With: patient    Overall Patient Progress: no changeOverall Patient Progress: no change       VSS on RA. R thigh cellulitis pain 7/10 getting IV dilaudid q3h PRN and toradol q6h PRN. I&D done by provider at bedside. Dressing in place, change if/when saturated.  PIV SL'd between abx.

## 2024-06-05 NOTE — PROGRESS NOTES
PRIMARY DIAGNOSIS: ACUTE PAIN  OUTPATIENT/OBSERVATION GOALS TO BE MET BEFORE DISCHARGE:  1. Pain Status: Improved but still requiring IV narcotics.    2. Return to near baseline physical activity: Yes    3. Cleared for discharge by consultants (if involved): No    Discharge Planner Nurse   Safe discharge environment identified: No    Barriers to discharge: Yes IV ABX and pain control       Entered by: Augusto Colorado RN 06/05/2024 1:30 PM   Pt A&O x4, c/o R thigh pain with some relief from prn Dilaudid 1mg IV. PIV patent and SL. Call light within reach and calls appropriately.     Please review provider order for any additional goals.   Nurse to notify provider when observation goals have been met and patient is ready for discharge.

## 2024-06-05 NOTE — PROGRESS NOTES
PRIMARY DIAGNOSIS: ACUTE PAIN  OUTPATIENT/OBSERVATION GOALS TO BE MET BEFORE DISCHARGE:  1. Pain Status: Improved but still requiring IV narcotics.    2. Return to near baseline physical activity: Yes    3. Cleared for discharge by consultants (if involved): No    Discharge Planner Nurse   Safe discharge environment identified: No  Barriers to discharge: Yes       Entered by: Gem Kathleen RN 06/05/2024 4:53 AM     Please review provider order for any additional goals.   Nurse to notify provider when observation goals have been met and patient is ready for discharge.      Pt alert and oriented x4. Room air. Pain 7/10 on right thigh. PRN IV dilaudid administered. Independent. Current blood glucose is 273. VSS.

## 2024-06-05 NOTE — PROGRESS NOTES
PRIMARY DIAGNOSIS: ACUTE PAIN  OUTPATIENT/OBSERVATION GOALS TO BE MET BEFORE DISCHARGE:  1. Pain Status: Improved but still requiring IV narcotics.    2. Return to near baseline physical activity: Yes    3. Cleared for discharge by consultants (if involved): No    Discharge Planner Nurse   Safe discharge environment identified: No  Barriers to discharge: Yes       Entered by: Gem Kathleen RN 06/05/2024 4:42 AM     Please review provider order for any additional goals.   Nurse to notify provider when observation goals have been met and patient is ready for discharge.      Patient alert and oriented x4. Pain 7/10 on right thigh. IV dilaudid administered multiple times. IV LR at 125 ml/hr.   MD paged to correct blood glucose of 321 at 0022. 5 units of inulin administered. VSS.

## 2024-06-05 NOTE — PROCEDURES
Procedure Note - Incision & Drainage    Patient was seen at bedside with family member, physician team, and bedside RN present. Consent was obtained. Location was marked on the lateral side of the right upper leg. Skin was cleansed with Betadine following standard procedure. 10 mL of lidocaine w/ epinephrine was used for local anesthesia. Vertical and horizontal cuts were made with scalpel. Intermittent pressure was performed on the surrounding site to induce expression of pus. Two culture swabs were obtained and will be sent to lab. Following the procedure, the area was cleansed with sterile water. Incision was left open for further drainage and covered with 4x4 gauze, held in place with paper tape. Patient tolerated the procedure well. Sharps were disposed of. Plan to leave incision open with gauze covering, replace gauze as needed when saturated.    This procedure was supervised by the attending physician, Dr. Quiroz, who agrees with the plan.    CELINE HAYES MD

## 2024-06-05 NOTE — PROGRESS NOTES
PRIMARY DIAGNOSIS: ACUTE PAIN  OUTPATIENT/OBSERVATION GOALS TO BE MET BEFORE DISCHARGE:  1. Pain Status: Improved but still requiring IV narcotics.    2. Return to near baseline physical activity: Yes    3. Cleared for discharge by consultants (if involved): No    Discharge Planner Nurse   Safe discharge environment identified: No  Barriers to discharge: Yes  requiring IV ABX         Entered by: Augusto Colorado RN 06/05/2024 10:40 AM   Pt A&O x4. Ind, c/o R thigh pain. call light within reach and calls appropriately.    Please review provider order for any additional goals.   Nurse to notify provider when observation goals have been met and patient is ready for discharge.

## 2024-06-05 NOTE — UTILIZATION REVIEW
Admission Status; Secondary Review Determination   Under the authority of the Utilization Management Committee, the utilization review process indicated a secondary review on Wolf Gibbs. The review outcome is based on review of the medical records, discussions with staff, and applying clinical experience noted on the date of the review.   (x) Inpatient Status Appropriate - This patient's medical care is consistent with medical management for inpatient care and reasonable inpatient medical practice.     RATIONALE FOR DETERMINATION   39-year-old male with type 1 diabetes admitted with right thigh cellulitis and was started on IV antibiotics.  On Friday, May 31 he placed a new Dexcom meter and the following day was seen in the ER and prescribed Keflex.  Symptoms progressed and on June 2 was given a dose of Rocephin and continued on Keflex.  On June 5 he saw his primary who sent him to the ER and admission was recommended due to continued spread of erythema and for IV antibiotics.  Ultrasound done today shows increase in deep subcutaneous soft tissue collection (measuring 5.6 x 4.9 x 1 cm previously 5.1 x 4.2 x 1.1 cm, which could represent abscess or phlegmon.  May need ultrasound-guided aspiration.  Has received at least 6 doses of IV narcotic in the last 24 hours for uncontrolled pain.    At the time of admission with the information available to the attending physician more than 2 nights Hospital complex care was anticipated, based on patient risk of adverse outcome if treated as outpatient and complex care required. Inpatient admission is appropriate based on the Medicare guidelines.   The information on this document is developed by the utilization review team in order for the business office to ensure compliance. This only denotes the appropriateness of proper admission status and does not reflect the quality of care rendered.   The definitions of Inpatient Status and Observation Status used in making the  determination above are those provided in the CMS Coverage Manual, Chapter 1 and Chapter 6, section 70.4.   Sincerely,   Rodrick Duff MD  Utilization Review  Physician Advisor  [facility]

## 2024-06-06 VITALS
OXYGEN SATURATION: 96 % | SYSTOLIC BLOOD PRESSURE: 135 MMHG | HEART RATE: 89 BPM | DIASTOLIC BLOOD PRESSURE: 81 MMHG | RESPIRATION RATE: 18 BRPM | HEIGHT: 69 IN | BODY MASS INDEX: 31.92 KG/M2 | TEMPERATURE: 97.8 F | WEIGHT: 215.5 LBS

## 2024-06-06 LAB
ANION GAP SERPL CALCULATED.3IONS-SCNC: 10 MMOL/L (ref 7–15)
BUN SERPL-MCNC: 11.4 MG/DL (ref 6–20)
CALCIUM SERPL-MCNC: 9.3 MG/DL (ref 8.6–10)
CHLORIDE SERPL-SCNC: 99 MMOL/L (ref 98–107)
CREAT SERPL-MCNC: 0.55 MG/DL (ref 0.67–1.17)
DEPRECATED HCO3 PLAS-SCNC: 27 MMOL/L (ref 22–29)
EGFRCR SERPLBLD CKD-EPI 2021: >90 ML/MIN/1.73M2
ERYTHROCYTE [DISTWIDTH] IN BLOOD BY AUTOMATED COUNT: 11.9 % (ref 10–15)
GLUCOSE BLDC GLUCOMTR-MCNC: 323 MG/DL (ref 70–99)
GLUCOSE BLDC GLUCOMTR-MCNC: 328 MG/DL (ref 70–99)
GLUCOSE SERPL-MCNC: 332 MG/DL (ref 70–99)
HCT VFR BLD AUTO: 35.1 % (ref 40–53)
HGB BLD-MCNC: 12.3 G/DL (ref 13.3–17.7)
MCH RBC QN AUTO: 30.6 PG (ref 26.5–33)
MCHC RBC AUTO-ENTMCNC: 35 G/DL (ref 31.5–36.5)
MCV RBC AUTO: 87 FL (ref 78–100)
PLATELET # BLD AUTO: 159 10E3/UL (ref 150–450)
POTASSIUM SERPL-SCNC: 4 MMOL/L (ref 3.4–5.3)
RBC # BLD AUTO: 4.02 10E6/UL (ref 4.4–5.9)
SODIUM SERPL-SCNC: 136 MMOL/L (ref 135–145)
VANCOMYCIN SERPL-MCNC: 4.6 UG/ML
WBC # BLD AUTO: 5.9 10E3/UL (ref 4–11)

## 2024-06-06 PROCEDURE — 36415 COLL VENOUS BLD VENIPUNCTURE: CPT

## 2024-06-06 PROCEDURE — 258N000003 HC RX IP 258 OP 636

## 2024-06-06 PROCEDURE — 250N000011 HC RX IP 250 OP 636

## 2024-06-06 PROCEDURE — 250N000013 HC RX MED GY IP 250 OP 250 PS 637

## 2024-06-06 PROCEDURE — 36415 COLL VENOUS BLD VENIPUNCTURE: CPT | Performed by: STUDENT IN AN ORGANIZED HEALTH CARE EDUCATION/TRAINING PROGRAM

## 2024-06-06 PROCEDURE — 80202 ASSAY OF VANCOMYCIN: CPT | Performed by: STUDENT IN AN ORGANIZED HEALTH CARE EDUCATION/TRAINING PROGRAM

## 2024-06-06 PROCEDURE — 85027 COMPLETE CBC AUTOMATED: CPT

## 2024-06-06 PROCEDURE — 80048 BASIC METABOLIC PNL TOTAL CA: CPT

## 2024-06-06 PROCEDURE — 99238 HOSP IP/OBS DSCHRG MGMT 30/<: CPT | Mod: 24

## 2024-06-06 RX ORDER — SULFAMETHOXAZOLE/TRIMETHOPRIM 800-160 MG
1 TABLET ORAL 2 TIMES DAILY
Qty: 14 TABLET | Refills: 0 | Status: SHIPPED | OUTPATIENT
Start: 2024-06-06 | End: 2024-08-28

## 2024-06-06 RX ORDER — OXYCODONE HYDROCHLORIDE 5 MG/1
5 TABLET ORAL EVERY 4 HOURS PRN
Status: DISCONTINUED | OUTPATIENT
Start: 2024-06-06 | End: 2024-06-06 | Stop reason: HOSPADM

## 2024-06-06 RX ORDER — OXYCODONE HYDROCHLORIDE 5 MG/1
5 TABLET ORAL EVERY 6 HOURS PRN
Qty: 12 TABLET | Refills: 0 | Status: SHIPPED | OUTPATIENT
Start: 2024-06-06 | End: 2024-08-28

## 2024-06-06 RX ADMIN — FENOFIBRATE 48 MG: 48 TABLET, COATED ORAL at 07:31

## 2024-06-06 RX ADMIN — METFORMIN HYDROCHLORIDE 500 MG: 500 TABLET, FILM COATED ORAL at 07:31

## 2024-06-06 RX ADMIN — OXYCODONE 5 MG: 5 TABLET ORAL at 12:46

## 2024-06-06 RX ADMIN — ACETAMINOPHEN 650 MG: 325 TABLET ORAL at 07:31

## 2024-06-06 RX ADMIN — HYDROMORPHONE HYDROCHLORIDE 1 MG: 1 INJECTION, SOLUTION INTRAMUSCULAR; INTRAVENOUS; SUBCUTANEOUS at 05:13

## 2024-06-06 RX ADMIN — HYDROXYZINE HYDROCHLORIDE 50 MG: 25 TABLET ORAL at 02:20

## 2024-06-06 RX ADMIN — VANCOMYCIN HYDROCHLORIDE 1500 MG: 5 INJECTION, POWDER, LYOPHILIZED, FOR SOLUTION INTRAVENOUS at 02:10

## 2024-06-06 RX ADMIN — ACETAMINOPHEN 650 MG: 325 TABLET ORAL at 02:21

## 2024-06-06 RX ADMIN — ACETAMINOPHEN 650 MG: 325 TABLET ORAL at 12:46

## 2024-06-06 RX ADMIN — ROSUVASTATIN CALCIUM 20 MG: 10 TABLET, FILM COATED ORAL at 07:31

## 2024-06-06 RX ADMIN — LOSARTAN POTASSIUM AND HYDROCHLOROTHIAZIDE 1 TABLET: 50; 12.5 TABLET, FILM COATED ORAL at 07:31

## 2024-06-06 RX ADMIN — HYDROMORPHONE HYDROCHLORIDE 1 MG: 1 INJECTION, SOLUTION INTRAMUSCULAR; INTRAVENOUS; SUBCUTANEOUS at 00:16

## 2024-06-06 RX ADMIN — OXYCODONE 5 MG: 5 TABLET ORAL at 08:52

## 2024-06-06 RX ADMIN — KETOROLAC TROMETHAMINE 15 MG: 15 INJECTION, SOLUTION INTRAMUSCULAR; INTRAVENOUS at 06:19

## 2024-06-06 ASSESSMENT — ACTIVITIES OF DAILY LIVING (ADL)
ADLS_ACUITY_SCORE: 18

## 2024-06-06 NOTE — DISCHARGE SUMMARY
"[facility]  Discharge Summary - Medicine & Pediatrics       Date of Admission:  6/4/2024  Date of Discharge:  6/6/2024  Discharging Provider:  Dr. 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    Weight as of this encounter: 97.8 kg (215 lb 8 oz).       Follow-ups Needed After Discharge   Follow-up Appointments     Follow-up and recommended labs and tests       Follow up with primary care provider, Carlyn Kwon, within 7 days for   hospital follow-up.  The following labs/tests are recommended: CBC, BMP.    Follow-up on diabetes management.            Unresulted Labs Ordered in the Past 30 Days of this Admission       Date and Time Order Name Status Description    6/6/2024  3:00 AM Vancomycin level In process     6/5/2024  2:18 PM Abscess Aerobic Bacterial Culture Routine With Gram Stain Preliminary     6/5/2024  2:18 PM Anaerobic Bacterial Culture Routine In process     6/4/2024 12:57 PM Blood Culture Peripheral Blood Preliminary     6/4/2024 12:57 PM Blood Culture Peripheral Blood Preliminary         These results will be followed up by PCP.    Discharge Disposition   Discharged to home  Condition at discharge: Stable    Hospital Course   Wolf Gibbs was admitted on 6/4/2024.  He has a history of type 1 diabetes, hypertension, elevated triglycerides and " presented to the ER with right leg pain.   He was admitted for right thigh cellulitis and was started on IV vancomycin in the ER.     On Friday, 5/31/2024, patient received a new Dexcom monitor (switched from G6 to G7 after getting insurance coverage).  He placed it on his right thigh.  The following morning 6/1/2024, he noticed that it was not reading his blood sugars correctly.  He would sometimes read blood sugars to the 400s followed directly by blood sugars around 40; he knows how he feels when he gets low blood sugars so he knew this was not accurate.  Shortly thereafter, he also started noticing leakage of blood from the area of the Dexcom as well as more irritation so he decided to just remove it.  Thereafter, the pain gradually increased that the day and became quite severe.  This prompted him to seek medical attention at the ER.  He was there for first seen at the ER on the evening of 6/1/2024.  An ultrasound was used to assess for the possibility that the needle was left behind but it did not identify any abscess or foreign body.  Patient was therefore sent home with prescription for Keflex.  Fortunately, Keflex was sent to the wrong pharmacy so took a while to get it.  He got through 2 doses of the Keflex before pain became severe enough that he needed to again present to the ER.  Therefore he went to the ER again on 6/2/2024.  There, they gave him a dose of ceftriaxone and advised him to continue the Keflex with close PCP follow-up.  He saw his PCP 6/4/24 who sent him to the ER for further evaluation.  In the ER, ER physician performed ultrasound at bedside and also ordered formal ultrasound which showed complex fluid collection but did not appear to have an area that would be very productive for traditional incision and drainage.  Therefore, patient was started on IV antibiotics and admitted to the hospital.  Of note, patient is a type I diabetic and had been out of insulin for a week up until last  night.  There was therefore consideration for DKA but given his glucose of 322 and no anion gap on lab workup, lower suspicion for this.     He remained hospitalized for IV antibiotics and pain control.  On 6/5/24, RLE US was repeated to see if abscess was more consolidated and drainable; it was, so patient underwent I&D later that day which resulted in good expression of pus.  Cultures were obtained, and aerobic culture grew gram positive cocci after 5 hours of incubation, with further speciation pending.     The following problems were addressed during his hospitalization:    Right upper leg cellulitis  Issue initially started on Friday 5/31/2024 after placing new Dexcom meter.  Was first seen in the ER on 6/1/2024 and prescribed Keflex.  Next seen in the ER on 6/2/2024 and given dose of ceftriaxone and continued on Keflex.  Saw his PCP for follow-up on 6/4/24 who sent him to the ER for further evaluation.  He was started on vancomycin in the ER and given Dilaudid with good relief of pain.   - IV vancomycin (6/4-6/6) will transition to Bactrim on discharge  - Scheduled Tylenol and ibuprofen for pain on discharge with 12 tablets of oxycodone for severe breakthrough pain     Type 1 diabetes  Was without insulin for a week up until 5/31/2024.  This was due to insurance/financial barriers.  Normally carb counts and uses 1 unit rapid acting per 10 carbs as well as 25 units basal at bedtime.  Given recent period of time with no insulin, there was some concern for DKA on presentation.  However labs were notable for glucose of 322 and no anion gap present so lower suspicion for this.    - Increase PTA basal insulin to 27 units given elevated blood sugars particularly in the mornings while hospitalized  - Carb counting, 1 unit per 10 carbs  - Medium sliding scale insulin     Hypertension  - PTA losartan-hydrochlorothiazide     Elevated triglycerides  - PTA rosuvastatin and fenofibrate    Consultations This Hospital Stay    PHARMACY TO DOSE VANCO  PHARMACY TO DOSE VANCO    Code Status   Full Code       The patient was discussed with Dr. Quiroz.    CELINE HAYES MD  [address] [address]  Phone: [phone]  Fax: [phone]  ______________________________________________________________________    Physical Exam   Vital Signs: Temp: 97.8  F (36.6  C) Temp src: Oral BP: 135/81 Pulse: 89   Resp: 18 SpO2: 96 % O2 Device: None (Room air)    Weight: 215 lbs 8 oz    Constitutional: Awake, alert, cooperative, no apparent distress, and appears stated age. Comfortable appearing, sitting up on the side of the bed.  HEENT: Normocephalic, without obvious abnormality, atraumatic. Sclera clear, conjunctiva normal. No rhinorrhea. Moist mucus membranes.  Respiratory: No increased work of breathing, good air exchange, clear to auscultation bilaterally, no crackles or wheezing.  Cardiovascular: Regular rate and rhythm, normal S1 and S2, no S3 or S4, and no murmur.  Skin: Right upper leg with improving erythema and edema, now well within the borders of previous marking. Significantly improved tenderness to palpation; area directly surrounding the abscess remains tender, but remainder of RLE largely non-tender. Incision from 6/5/24 I&D appears to be healing well, no active drainage. Bandage removed which demonstrates saturation from overnight drainage.  Neurologic: Awake, alert, oriented to name, place and time. Normal tone.  Psychiatric: Pleasant mood and affect.      Primary Care Physician   Carlyn Kwon    Discharge Orders      Reason for your hospital stay    Cellulitis/abscess of right thigh     Follow-up and recommended labs and tests     Follow up with primary care provider, Carlyn Kwon, within 7 days for hospital follow-up.  The following labs/tests are recommended: CBC, BMP.  Follow-up on diabetes management.     Activity    Your activity upon discharge:  activity as tolerated     Diet    Follow this diet upon discharge: Orders Placed This Encounter      Combination Diet Regular Diet Adult       Significant Results and Procedures   Results for orders placed or performed during the hospital encounter of 06/04/24   US Lower Extremity Non Vascular Right    Narrative    EXAM: US LOWER EXTREMITY NON VASCULAR RIGHT  LOCATION: [facility]  DATE: 6/4/2024    INDICATION: cellulitis, eval for abscess  COMPARISON: None.  TECHNIQUE: Routine.    FINDINGS/    Impression    IMPRESSION: Corresponding to the palpable lump is a complex heterogeneous fluid collection measuring 5.1 x 4.2 x 1.1 cm with surrounding hyperemia and subcutaneous edema. Findings suggestive of phlegmon/developing abscess with associated cellulitis. This   is in close proximity to the glucose monitor.   US Lower Extremity Non Vascular Right    Narrative    EXAM: US LOWER EXTREMITY NON VASCULAR RIGHT  LOCATION: [facility]  DATE: 6/5/2024    INDICATION: Cellulitis, concern for worsening abscess  COMPARISON: Ultrasound 6/4/2024, 2 view right femur 6/1/2024  TECHNIQUE: Routine.    FINDINGS: Scans demonstrate slight increase in hypoechoic collection in the deep subcutaneous tissues of the right mid anterior thigh, currently measuring 5.6 x 4.9 x 1.0 cm, previously 5.1 x 4.2 x 1.1 cm.      Impression    IMPRESSION:  1.  Slight interval increase in deep subcutaneous soft tissue collection anterior mid thigh which could represent abscess or phlegmon. This would be amendable to ultrasound-guided aspiration.       Discharge Medications   Current Discharge Medication List        START taking these medications    Details   sulfamethoxazole-trimethoprim (BACTRIM DS) 800-160 MG tablet Take 1 tablet by mouth 2 times daily  Qty: 14 tablet, Refills: 0    Associated Diagnoses: Cellulitis of right thigh           CONTINUE these medications which have CHANGED    Details   oxyCODONE  (ROXICODONE) 5 MG tablet Take 1 tablet (5 mg) by mouth every 6 hours as needed for moderate pain  Qty: 12 tablet, Refills: 0    Associated Diagnoses: Cellulitis of right thigh           CONTINUE these medications which have NOT CHANGED    Details   fenofibrate (TRICOR) 48 MG tablet Take 1 tablet (48 mg) by mouth daily  Qty: 90 tablet, Refills: 3    Associated Diagnoses: Pure hypertriglyceridemia      fish oil-omega-3 fatty acids 1000 MG capsule Take 1 g by mouth daily      ibuprofen (ADVIL/MOTRIN) 600 MG tablet Take 600 mg by mouth every 6 hours as needed      insulin glargine (LANTUS SOLOSTAR) 100 UNIT/ML pen Inject 25 Units Subcutaneous at bedtime  Qty: 30 mL, Refills: 1    Comments: If Lantus is not covered by insurance, may substitute Basaglar or Semglee or other insulin glargine product per insurance preference at same dose and frequency.    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), managed as type 1 (H); Diabetes mellitus due to underlying condition without complication, with long-term current use of insulin (H)      losartan-hydrochlorothiazide (HYZAAR) 50-12.5 MG tablet Take 1 tablet by mouth daily  Qty: 90 tablet, Refills: 3    Associated Diagnoses: Benign essential hypertension      metFORMIN (GLUCOPHAGE) 500 MG tablet Take 1 tablet (500 mg) by mouth daily (with breakfast)  Qty: 90 tablet, Refills: 3    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 blood glucose (GLUCOSE METER TEST) test strip testing 4-6x/ day  Qty: 100 strip, Refills: 11    Associated Diagnoses: Diabetes mellitus due to underlying condition without complication, with long-term current use of insulin (H)      !! blood glucose (NO BRAND SPECIFIED) test strip Use to test blood sugar 4 times daily or as directed.  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 blood glucose (NO BRAND SPECIFIED) test strip Instructions: testing 4-6x/ day  Qty: 100 strip, Refills: 11    Associated Diagnoses: Diabetes mellitus due to underlying condition without complication, with long-term current use of insulin (H)      Continuous Glucose Sensor (DEXCOM G7 SENSOR) MISC Change every 10 days.  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 - Potential duplicate medications found. Please discuss with provider.        STOP taking these medications       cephALEXin (KEFLEX) 500 MG capsule Comments:   Reason for Stopping:             Allergies   No Known Allergies

## 2024-06-06 NOTE — PLAN OF CARE
Goal Outcome Evaluation:      Plan of Care Reviewed With: patient    Overall Patient Progress: improvingOverall Patient Progress: improving         VSS on RA. R thigh redness and swelling decreasing. Reports pain 7/10, comes down to 5/10 with tylenol and oxycodone. Feeling well enough to discharge. Educated on symptoms to look out. Reviewed discharge po abx and oxycodone as well as when to follow up. Pt verbalized understanding.

## 2024-06-06 NOTE — PLAN OF CARE
Goal Outcome Evaluation:    Problem: Adult Inpatient Plan of Care  Goal: Absence of Hospital-Acquired Illness or Injury  Outcome: Progressing  Intervention: Identify and Manage Fall Risk  Recent Flowsheet Documentation  Taken 6/5/2024 2007 by Lilliana Henderson RN  Safety Promotion/Fall Prevention: safety round/check completed  Goal: Optimal Comfort and Wellbeing  Outcome: Progressing     Problem: Pain Acute  Goal: Optimal Pain Control and Function  Outcome: Progressing    VSS on room air. Pain management still requiring IV dilaudid. Patient rating pain as severe, 7/10 and reports leg feels more tight and swollen.  Tylenol, ice packs, and atarax also given for pain. IV antibiotic running. Independent in room.

## 2024-06-06 NOTE — DISCHARGE INSTRUCTIONS
For pain control, start with taking Tylenol and ibuprofen on a scheduled basis. For severe pain, we will send you with a prescription for 12 tablets of oxycodone. If your pain is continuing to be severe, you should follow-up with your PCP or return to the ER sooner.

## 2024-06-07 ENCOUNTER — PATIENT OUTREACH (OUTPATIENT)
Dept: CARE COORDINATION | Facility: CLINIC | Age: 39
End: 2024-06-07
Payer: COMMERCIAL

## 2024-06-07 NOTE — PROGRESS NOTES
"Clinic Care Coordination Contact  Transitions of Care Outreach  Chief Complaint   Patient presents with    Clinic Care Coordination - Post Hospital       Most Recent Admission Date: 6/4/2024   Most Recent Admission Diagnosis: Phlegmon - L02.91  Hyperglycemia - R73.9  Cellulitis of right thigh - L03.115     Most Recent Discharge Date: 6/6/2024   Most Recent Discharge Diagnosis: Cellulitis of right thigh - L03.115  Phlegmon - L02.91  Hyperglycemia - R73.9     Transitions of Care Assessment    Discharge Assessment  How are you doing now that you are home?: \"It's doing better, still some draining and the pain is no where near what it was.\"  How are your symptoms? (Red Flag symptoms escalate to triage hotline per guidelines): Improved  Do you know how to contact your clinic care team if you have future questions or changes to your health status? : Yes  Does the patient have their discharge instructions? : Yes  Does the patient have questions regarding their discharge instructions? : No  Were you started on any new medications or were there changes to any of your previous medications? : Yes  Does the patient have all of their medications?: Yes  Do you have questions regarding any of your medications? : No  Do you have all of your needed medical supplies or equipment (DME)?  (i.e. oxygen tank, CPAP, cane, etc.): Yes    Post-op ([facility] Only)  If the patient had a surgery or procedure, do they have any questions for a nurse?: No         [facility] Explained and offered Care Coordination support to eligible patients: Yes    Patient accepted? No    Follow up Plan     Discharge Follow-Up  Discharge follow up appointment scheduled in alignment with recommended follow up timeframe or Transitions of Risk Category? (Low = within 30 days; Moderate= within 14 days; High= within 7 days): No    No future appointments.    Outpatient Plan as outlined on AVS reviewed with patient.    For any urgent concerns, please contact our 24 hour nurse " triage line: [phone] ([phone])       JERZY Galvan  [phone]  [facility] Care Resource [facility]

## 2024-06-08 LAB
BACTERIA ABSC ANAEROBE+AEROBE CULT: ABNORMAL
GRAM STAIN RESULT: ABNORMAL
GRAM STAIN RESULT: ABNORMAL

## 2024-06-09 LAB
BACTERIA BLD CULT: NO GROWTH
BACTERIA BLD CULT: NO GROWTH

## 2024-06-12 LAB — BACTERIA ABSC ANAEROBE+AEROBE CULT: NORMAL

## 2024-07-27 ENCOUNTER — HEALTH MAINTENANCE LETTER (OUTPATIENT)
Age: 39
End: 2024-07-27

## 2024-08-28 ENCOUNTER — OFFICE VISIT (OUTPATIENT)
Dept: FAMILY MEDICINE | Facility: CLINIC | Age: 39
End: 2024-08-28
Payer: COMMERCIAL

## 2024-08-28 VITALS
BODY MASS INDEX: 31.53 KG/M2 | OXYGEN SATURATION: 97 % | RESPIRATION RATE: 16 BRPM | TEMPERATURE: 97.5 F | DIASTOLIC BLOOD PRESSURE: 70 MMHG | HEIGHT: 69 IN | WEIGHT: 212.9 LBS | SYSTOLIC BLOOD PRESSURE: 126 MMHG | HEART RATE: 90 BPM

## 2024-08-28 DIAGNOSIS — Z01.818 PREOP GENERAL PHYSICAL EXAM: Primary | ICD-10-CM

## 2024-08-28 DIAGNOSIS — E78.1 PURE HYPERTRIGLYCERIDEMIA: ICD-10-CM

## 2024-08-28 DIAGNOSIS — K40.90 LEFT INGUINAL HERNIA: ICD-10-CM

## 2024-08-28 DIAGNOSIS — E13.9 LADA (LATENT AUTOIMMUNE DIABETES IN ADULTS), MANAGED AS TYPE 1 (H): ICD-10-CM

## 2024-08-28 PROBLEM — L03.115 CELLULITIS OF RIGHT THIGH: Status: RESOLVED | Noted: 2024-06-04 | Resolved: 2024-08-28

## 2024-08-28 PROBLEM — L02.91 PHLEGMON: Status: RESOLVED | Noted: 2024-06-04 | Resolved: 2024-08-28

## 2024-08-28 LAB
ALBUMIN SERPL BCG-MCNC: 4.4 G/DL (ref 3.5–5.2)
ALP SERPL-CCNC: 53 U/L (ref 40–150)
ALT SERPL W P-5'-P-CCNC: 16 U/L (ref 0–70)
ANION GAP SERPL CALCULATED.3IONS-SCNC: 10 MMOL/L (ref 7–15)
AST SERPL W P-5'-P-CCNC: 16 U/L (ref 0–45)
BILIRUB SERPL-MCNC: 1.1 MG/DL
BUN SERPL-MCNC: 9.7 MG/DL (ref 6–20)
CALCIUM SERPL-MCNC: 9.4 MG/DL (ref 8.8–10.4)
CHLORIDE SERPL-SCNC: 105 MMOL/L (ref 98–107)
CHOLEST SERPL-MCNC: 176 MG/DL
CREAT SERPL-MCNC: 0.73 MG/DL (ref 0.67–1.17)
EGFRCR SERPLBLD CKD-EPI 2021: >90 ML/MIN/1.73M2
ERYTHROCYTE [DISTWIDTH] IN BLOOD BY AUTOMATED COUNT: 12.4 % (ref 10–15)
FASTING STATUS PATIENT QL REPORTED: ABNORMAL
FASTING STATUS PATIENT QL REPORTED: ABNORMAL
GLUCOSE SERPL-MCNC: 297 MG/DL (ref 70–99)
HBA1C MFR BLD: 7.3 % (ref 0–5.6)
HCO3 SERPL-SCNC: 26 MMOL/L (ref 22–29)
HCT VFR BLD AUTO: 41 % (ref 40–53)
HDLC SERPL-MCNC: 38 MG/DL
HGB BLD-MCNC: 14.8 G/DL (ref 13.3–17.7)
LDLC SERPL CALC-MCNC: 107 MG/DL
MCH RBC QN AUTO: 30.5 PG (ref 26.5–33)
MCHC RBC AUTO-ENTMCNC: 36.1 G/DL (ref 31.5–36.5)
MCV RBC AUTO: 84 FL (ref 78–100)
NONHDLC SERPL-MCNC: 138 MG/DL
PLATELET # BLD AUTO: 163 10E3/UL (ref 150–450)
POTASSIUM SERPL-SCNC: 4.3 MMOL/L (ref 3.4–5.3)
PROT SERPL-MCNC: 6.5 G/DL (ref 6.4–8.3)
RBC # BLD AUTO: 4.86 10E6/UL (ref 4.4–5.9)
SODIUM SERPL-SCNC: 141 MMOL/L (ref 135–145)
TRIGL SERPL-MCNC: 153 MG/DL
WBC # BLD AUTO: 7.1 10E3/UL (ref 4–11)

## 2024-08-28 PROCEDURE — 80061 LIPID PANEL: CPT | Performed by: NURSE PRACTITIONER

## 2024-08-28 PROCEDURE — 99213 OFFICE O/P EST LOW 20 MIN: CPT | Performed by: NURSE PRACTITIONER

## 2024-08-28 PROCEDURE — 85027 COMPLETE CBC AUTOMATED: CPT | Performed by: NURSE PRACTITIONER

## 2024-08-28 PROCEDURE — 83036 HEMOGLOBIN GLYCOSYLATED A1C: CPT | Performed by: NURSE PRACTITIONER

## 2024-08-28 PROCEDURE — 80053 COMPREHEN METABOLIC PANEL: CPT | Performed by: NURSE PRACTITIONER

## 2024-08-28 PROCEDURE — 36415 COLL VENOUS BLD VENIPUNCTURE: CPT | Performed by: NURSE PRACTITIONER

## 2024-08-28 ASSESSMENT — PAIN SCALES - GENERAL: PAINLEVEL: NO PAIN (0)

## 2024-08-28 NOTE — PROGRESS NOTES
Preoperative Evaluation  [facility] [place]  [address] [facility] [address]  [place]  [facility] [address]  Phone: [phone]  Fax: [phone]  Primary Provider: Carlyn Kwon CNP  Pre-op Performing Provider: Carlyn Kwon CNP  Aug 28, 2024             8/28/2024   Surgical Information   What procedure is being done? hernia repair   [facility]   Who is doing the procedure / surgery? Dr Steward   Date of surgery / procedure: [id number]   Time of surgery / procedure: 130   Where do you plan to recover after surgery? at home with family        Fax number for surgical facility: [phone]      Stewart Bloom is a 39 year old, presenting for the following:  Pre-Op Exam (August 29 th - [facility]- F -[id number].Inguinal Hernia / Labs Needed A1C and Random Glucose Needed.)          8/28/2024     8:53 AM   Additional Questions   Roomed by  LPN     HPI related to upcoming procedure: hernia developed 8/10/24 after lifting.  Aching pain in groin, sharp at times.          8/28/2024   Pre-Op Questionnaire   Have you ever had a heart attack or stroke? No   Have you ever had surgery on your heart or blood vessels, such as a stent placement, a coronary artery bypass, or surgery on an artery in your head, neck, heart, or legs? No   Do you have chest pain with activity? No   Do you have a history of heart failure? No   Do you currently have a cold, bronchitis or symptoms of other infection? No   Do you have a cough, shortness of breath, or wheezing? No   Do you or anyone in your family have previous history of blood clots? No   Do you or does anyone in your family have a serious bleeding problem such as prolonged bleeding following surgeries or cuts? No   Have you ever had problems with anemia or been told to take iron pills? No   Have you had any abnormal blood loss such as black, tarry or bloody  stools? No   Have you ever had a blood transfusion? No   Are you willing to have a blood transfusion if it is medically needed before, during, or after your surgery? Yes   Have you or any of your relatives ever had problems with anesthesia? No   Do you have sleep apnea, excessive snoring or daytime drowsiness? No   Do you have any artifical heart valves or other implanted medical devices like a pacemaker, defibrillator, or continuous glucose monitor? No   Do you have artificial joints? No   Are you allergic to latex? No        Health Care Directive  Patient does not have a Health Care Directive or Living Will: Discussed advance care planning with patient; however, patient declined at this time.    Preoperative Review of    reviewed - no record of controlled substances prescribed.        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), managed as type 1 (H) 07/19/2022     Priority: Medium    Cigarette nicotine dependence with nicotine-induced disorder 01/25/2016     Priority: Medium    Benign essential hypertension 11/13/2010     Priority: Medium    Pure hypertriglyceridemia 11/13/2010     Priority: Medium      Past Medical History:   Diagnosis Date    Cellulitis of right thigh 06/04/2024    Phlegmon 06/04/2024    Tobacco use disorder 11/13/2010     Past Surgical History:   Procedure Laterality Date    NO PAST SURGERIES       Current Outpatient Medications   Medication Sig Dispense Refill    blood glucose (GLUCOSE METER TEST) test strip testing 4-6x/ day 100 strip 11    blood glucose (NO BRAND SPECIFIED) test strip Use to test blood sugar 4 times daily or as directed. 200 strip 2    blood glucose (NO BRAND SPECIFIED) test strip Instructions: testing 4-6x/ day 100 strip 11    Continuous Glucose Sensor (DEXCOM G7 SENSOR) MISC Change every 10  days. 3 each 5    fenofibrate (TRICOR) 48 MG tablet Take 1 tablet (48 mg) by mouth daily 90 tablet 3    fish oil-omega-3 fatty acids 1000 MG capsule Take 1 g by mouth daily      ibuprofen (ADVIL/MOTRIN) 600 MG tablet Take 600 mg by mouth every 6 hours as needed      insulin glargine (LANTUS SOLOSTAR) 100 UNIT/ML pen Inject 25 Units Subcutaneous at bedtime 30 mL 1    insulin lispro (HUMALOG KWIKPEN) 100 UNIT/ML (1 unit dial) KWIKPEN Inject 30 Units Subcutaneous 3 times daily (before meals) 75 mL 1    insulin pen needle (32G X 4 MM) 32G X 4 MM miscellaneous injecting 4-6x/ day 400 each 1    losartan-hydrochlorothiazide (HYZAAR) 50-12.5 MG tablet Take 1 tablet by mouth daily 90 tablet 3    metFORMIN (GLUCOPHAGE) 500 MG tablet Take 1 tablet (500 mg) by mouth daily (with breakfast) 90 tablet 3    multivitamin, therapeutic (THERA-VIT) TABS tablet Take 1 tablet by mouth daily      rosuvastatin (CRESTOR) 20 MG tablet Take 1 tablet (20 mg) by mouth daily 90 tablet 1    UNABLE TO FIND Sea calles supplement 1 every day     Lion's georgiana mushroom supplement 1 every day      oxyCODONE (ROXICODONE) 5 MG tablet Take 1 tablet (5 mg) by mouth every 6 hours as needed for moderate pain (Patient not taking: Reported on 8/28/2024) 12 tablet 0    sulfamethoxazole-trimethoprim (BACTRIM DS) 800-160 MG tablet Take 1 tablet by mouth 2 times daily (Patient not taking: Reported on 8/28/2024) 14 tablet 0       No Known Allergies     Social History     Tobacco Use    Smoking status: Former     Current packs/day: 1.00     Types: Cigarettes     Passive exposure: Past    Smokeless tobacco: Never   Substance Use Topics    Alcohol use: Not Currently     Family History   Problem Relation Age of Onset    Hypertension Mother     Hyperthyroidism Mother     Alcoholism Father     Depression Father     Diabetes Type 1 Sister     Down Syndrome Sister     No Known Problems Brother     Myocardial Infarction Paternal Grandfather 48     History   Drug Use Not on  "file             Review of Systems  CONSTITUTIONAL: NEGATIVE for fever, chills, change in weight  INTEGUMENTARY/SKIN: NEGATIVE for worrisome rashes, moles or lesions  EYES: NEGATIVE for vision changes or irritation  ENT/MOUTH: NEGATIVE for ear, mouth and throat problems  RESP: NEGATIVE for significant cough or SOB  CV: NEGATIVE for chest pain, palpitations or peripheral edema  GI: left inguinal hernia  : NEGATIVE for frequency, dysuria, or hematuria  MUSCULOSKELETAL: NEGATIVE for significant arthralgias or myalgia  NEURO: NEGATIVE for weakness, dizziness or paresthesias  ENDOCRINE: diabetes  HEME: NEGATIVE for bleeding problems  PSYCHIATRIC: NEGATIVE for changes in mood or affect    Objective    /70 (BP Location: Left arm, Patient Position: Sitting, Cuff Size: Adult Regular)   Pulse 90   Temp 97.5  F (36.4  C) (Oral)   Resp 16   Ht 1.74 m (5' 8.5\")   Wt 96.6 kg (212 lb 14.4 oz)   SpO2 97%   BMI 31.90 kg/m     Estimated body mass index is 31.9 kg/m  as calculated from the following:    Height as of this encounter: 1.74 m (5' 8.5\").    Weight as of this encounter: 96.6 kg (212 lb 14.4 oz).  Physical Exam  GENERAL: alert and no distress  EYES: Eyes grossly normal to inspection, PERRL and conjunctivae and sclerae normal  HEENT: ear canals and TM's normal, nose and mouth without ulcers or lesions  NECK: no adenopathy, no asymmetry, masses, or scars  RESP: lungs clear to auscultation - no rales, rhonchi or wheezes  CV: regular rate and rhythm, normal S1 S2, no S3 or S4, no murmur, click or rub, no peripheral edema  ABDOMEN: tenderness lower abdomen  MS: no gross musculoskeletal defects noted, no edema  SKIN: no suspicious lesions or rashes  NEURO: Normal strength and tone, mentation intact and speech normal  PSYCH: mentation appears normal, affect normal/bright    Recent Labs   Lab Test 06/06/24  0559 06/05/24  0630 06/04/24  1321 04/16/24  1538 09/22/23  1337   HGB 12.3* 12.7*   < > 17.1  --     " 138*   < > 195  --     138   < > 135  --    POTASSIUM 4.0 3.9   < > 3.7  --    CR 0.55* 0.44*   < > 0.55*  --    A1C  --   --   --  11.3* 13.8*    < > = values in this interval not displayed.        Diagnostics  Labs pending at this time.  Results will be reviewed when available.   No EKG required, no history of coronary heart disease, significant arrhythmia, peripheral arterial disease or other structural heart disease.    Revised Cardiac Risk Index (RCRI)  The patient has the following serious cardiovascular risks for perioperative complications:   - No serious cardiac risks = 0 points     RCRI Interpretation: 1 point: Class II (low risk - 0.9% complication rate)     A/P:  1. Preop general physical exam  Cleared for surgery  - CBC with platelets; Future  - CBC with platelets    2. Left inguinal hernia  Surgery as scheduled  - CBC with platelets; Future  - CBC with platelets    3. 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 Bili, TP); Future  - Hemoglobin A1c  - Comprehensive metabolic panel (BMP + Alb, Alk Phos, ALT, AST, Total. Bili, TP)    4. Pure hypertriglyceridemia  - Lipid panel reflex to direct LDL Fasting      Signed Electronically by: Carlyn Kwon CNP  A copy of this evaluation report is provided to the requesting physician.

## 2024-08-28 NOTE — PATIENT INSTRUCTIONS

## 2024-09-03 DIAGNOSIS — Z79.4 DIABETES MELLITUS DUE TO UNDERLYING CONDITION WITHOUT COMPLICATION, WITH LONG-TERM CURRENT USE OF INSULIN (H): ICD-10-CM

## 2024-09-03 DIAGNOSIS — E08.9 DIABETES MELLITUS DUE TO UNDERLYING CONDITION WITHOUT COMPLICATION, WITH LONG-TERM CURRENT USE OF INSULIN (H): ICD-10-CM

## 2024-09-03 DIAGNOSIS — I10 BENIGN ESSENTIAL HYPERTENSION: ICD-10-CM

## 2024-09-03 DIAGNOSIS — E78.1 PURE HYPERTRIGLYCERIDEMIA: ICD-10-CM

## 2024-09-03 DIAGNOSIS — E78.1 HIGH TRIGLYCERIDES: Primary | ICD-10-CM

## 2024-09-03 RX ORDER — ROSUVASTATIN CALCIUM 40 MG/1
40 TABLET, COATED ORAL DAILY
Qty: 90 TABLET | Refills: 1 | Status: SHIPPED | OUTPATIENT
Start: 2024-09-03

## 2024-12-14 ENCOUNTER — HEALTH MAINTENANCE LETTER (OUTPATIENT)
Age: 39
End: 2024-12-14

## 2024-12-18 ENCOUNTER — OFFICE VISIT (OUTPATIENT)
Dept: FAMILY MEDICINE | Facility: CLINIC | Age: 39
End: 2024-12-18
Payer: COMMERCIAL

## 2024-12-18 VITALS
OXYGEN SATURATION: 98 % | RESPIRATION RATE: 16 BRPM | SYSTOLIC BLOOD PRESSURE: 120 MMHG | DIASTOLIC BLOOD PRESSURE: 66 MMHG | TEMPERATURE: 98.4 F | HEIGHT: 69 IN | BODY MASS INDEX: 33.19 KG/M2 | WEIGHT: 224.1 LBS | HEART RATE: 90 BPM

## 2024-12-18 DIAGNOSIS — Z79.4 DIABETES MELLITUS DUE TO UNDERLYING CONDITION WITHOUT COMPLICATION, WITH LONG-TERM CURRENT USE OF INSULIN (H): Primary | ICD-10-CM

## 2024-12-18 DIAGNOSIS — I10 BENIGN ESSENTIAL HYPERTENSION: ICD-10-CM

## 2024-12-18 DIAGNOSIS — E13.9 LADA (LATENT AUTOIMMUNE DIABETES IN ADULTS), MANAGED AS TYPE 1 (H): ICD-10-CM

## 2024-12-18 DIAGNOSIS — E78.1 PURE HYPERTRIGLYCERIDEMIA: ICD-10-CM

## 2024-12-18 DIAGNOSIS — E08.9 DIABETES MELLITUS DUE TO UNDERLYING CONDITION WITHOUT COMPLICATION, WITH LONG-TERM CURRENT USE OF INSULIN (H): Primary | ICD-10-CM

## 2024-12-18 LAB
EST. AVERAGE GLUCOSE BLD GHB EST-MCNC: 226 MG/DL
HBA1C MFR BLD: 9.5 % (ref 0–5.6)

## 2024-12-18 RX ORDER — LOSARTAN POTASSIUM AND HYDROCHLOROTHIAZIDE 12.5; 5 MG/1; MG/1
1 TABLET ORAL DAILY
Qty: 90 TABLET | Refills: 3 | Status: SHIPPED | OUTPATIENT
Start: 2024-12-18

## 2024-12-18 RX ORDER — FENOFIBRATE 48 MG/1
48 TABLET, COATED ORAL DAILY
Qty: 90 TABLET | Refills: 3 | Status: SHIPPED | OUTPATIENT
Start: 2024-12-18

## 2024-12-18 RX ORDER — INSULIN LISPRO 100 [IU]/ML
30 INJECTION, SOLUTION INTRAVENOUS; SUBCUTANEOUS
Qty: 90 ML | Refills: 1 | Status: SHIPPED | OUTPATIENT
Start: 2024-12-18

## 2024-12-18 RX ORDER — INSULIN GLARGINE 100 [IU]/ML
30 INJECTION, SOLUTION SUBCUTANEOUS AT BEDTIME
Qty: 30 ML | Refills: 1 | Status: SHIPPED | OUTPATIENT
Start: 2024-12-18

## 2024-12-18 ASSESSMENT — PAIN SCALES - GENERAL: PAINLEVEL_OUTOF10: NO PAIN (0)

## 2024-12-18 NOTE — PROGRESS NOTES
Stewart Bloom is a 39 year old, presenting for the following health issues:  Diabetes      12/18/2024     2:21 PM   Additional Questions   Roomed by  LPN     History of Present Illness       Diabetes:   He presents for follow up of diabetes.  He is checking home blood glucose four or more times daily.   He checks blood glucose before meals, after meals, before and after meals and at bedtime.  Blood glucose is sometimes over 200 and sometimes under 70. He is aware of hypoglycemia symptoms including shakiness and weakness.    He has no concerns regarding his diabetes at this time.   He is not experiencing numbness or burning in feet, excessive thirst, blurry vision, weight changes or redness, sores or blisters on feet.           He eats 2-3 servings of fruits and vegetables daily.He consumes 0 sweetened beverage(s) daily.He exercises with enough effort to increase his heart rate 20 to 29 minutes per day.  He exercises with enough effort to increase his heart rate 4 days per week. He is missing 2 dose(s) of medications per week.  He is not taking prescribed medications regularly due to remembering to take.       Diabetes Follow-up    How often are you checking your blood sugar? Four or more times daily  Blood sugar testing frequency justification:  Uncontrolled diabetes and Adjustment of medication(s)  What time of day are you checking your blood sugars (select all that apply)?  Before and after meals  Have you had any blood sugars above 200?  Yes   Have you had any blood sugars below 70?  Yes   What symptoms do you notice when your blood sugar is low?  Shaky and Dizzy  What concerns do you have today about your diabetes? None   Do you have any of these symptoms? (Select all that apply)  No numbness or tingling in feet.  No redness, sores or blisters on feet.  No complaints of excessive thirst.  No reports of blurry vision.  No significant changes to weight.      BP Readings from Last 2 Encounters:   12/18/24  "120/66   08/28/24 126/70     Hemoglobin A1C (%)   Date Value   12/18/2024 9.5 (H)   08/28/2024 7.3 (H)     LDL Cholesterol Calculated   Date Value   08/28/2024 107 mg/dL (H)   04/16/2024      Comment:     Cannot estimate LDL when triglyceride exceeds 400 mg/dL     LDL Cholesterol Direct (mg/dL)   Date Value   04/16/2024 49             Review of Systems  Constitutional, neuro, ENT, endocrine, pulmonary, cardiac, gastrointestinal, genitourinary, musculoskeletal, integument and psychiatric systems are negative, except as otherwise noted.      Objective    /66 (BP Location: Left arm, Patient Position: Sitting, Cuff Size: Adult Regular)   Pulse 90   Temp 98.4  F (36.9  C) (Oral)   Resp 16   Ht 1.74 m (5' 8.5\")   Wt 101.7 kg (224 lb 1.6 oz)   SpO2 98%   BMI 33.58 kg/m    Body mass index is 33.58 kg/m .  Physical Exam   GENERAL: alert and no distress  RESP: lungs clear to auscultation - no rales, rhonchi or wheezes  CV: regular rate and rhythm, normal S1 S2, no S3 or S4, no murmur, click or rub, no peripheral edema   ABDOMEN: soft, nontender, no hepatosplenomegaly, no masses and bowel sounds normal  MS: no gross musculoskeletal defects noted, no edema  NEURO: Normal strength and tone, mentation intact and speech normal  PSYCH: mentation appears normal, affect normal/bright  Diabetic foot exam: normal DP and PT pulses, no trophic changes or ulcerative lesions, and normal sensory exam    Office Visit on 08/28/2024   Component Date Value Ref Range Status    Cholesterol 08/28/2024 176  <200 mg/dL Final    Triglycerides 08/28/2024 153 (H)  <150 mg/dL Final    Direct Measure HDL 08/28/2024 38 (L)  >=40 mg/dL Final    LDL Cholesterol Calculated 08/28/2024 107 (H)  <=100 mg/dL Final    Non HDL Cholesterol 08/28/2024 138 (H)  <130 mg/dL Final    Patient Fasting > 8hrs? 08/28/2024 Unknown   Final    Hemoglobin A1C 08/28/2024 7.3 (H)  0.0 - 5.6 % Final    Normal <5.7%   Prediabetes 5.7-6.4%    Diabetes 6.5% or higher   "   Note: Adopted from ADA consensus guidelines.    Sodium 08/28/2024 141  135 - 145 mmol/L Final    Potassium 08/28/2024 4.3  3.4 - 5.3 mmol/L Final    Carbon Dioxide (CO2) 08/28/2024 26  22 - 29 mmol/L Final    Anion Gap 08/28/2024 10  7 - 15 mmol/L Final    Urea Nitrogen 08/28/2024 9.7  6.0 - 20.0 mg/dL Final    Creatinine 08/28/2024 0.73  0.67 - 1.17 mg/dL Final    GFR Estimate 08/28/2024 >90  >60 mL/min/1.73m2 Final    eGFR calculated using 2021 CKD-EPI equation.    Calcium 08/28/2024 9.4  8.8 - 10.4 mg/dL Final    Reference intervals for this test were updated on 7/16/2024 to reflect our healthy population more accurately. There may be differences in the flagging of prior results with similar values performed with this method. Those prior results can be interpreted in the context of the updated reference intervals.    Chloride 08/28/2024 105  98 - 107 mmol/L Final    Glucose 08/28/2024 297 (H)  70 - 99 mg/dL Final    Alkaline Phosphatase 08/28/2024 53  40 - 150 U/L Final    AST 08/28/2024 16  0 - 45 U/L Final    ALT 08/28/2024 16  0 - 70 U/L Final    Protein Total 08/28/2024 6.5  6.4 - 8.3 g/dL Final    Albumin 08/28/2024 4.4  3.5 - 5.2 g/dL Final    Bilirubin Total 08/28/2024 1.1  <=1.2 mg/dL Final    Patient Fasting > 8hrs? 08/28/2024 Unknown   Final    WBC Count 08/28/2024 7.1  4.0 - 11.0 10e3/uL Final    RBC Count 08/28/2024 4.86  4.40 - 5.90 10e6/uL Final    Hemoglobin 08/28/2024 14.8  13.3 - 17.7 g/dL Final    Hematocrit 08/28/2024 41.0  40.0 - 53.0 % Final    MCV 08/28/2024 84  78 - 100 fL Final    MCH 08/28/2024 30.5  26.5 - 33.0 pg Final    MCHC 08/28/2024 36.1  31.5 - 36.5 g/dL Final    RDW 08/28/2024 12.4  10.0 - 15.0 % Final    Platelet Count 08/28/2024 163  150 - 450 10e3/uL Final       A/P:  1. Diabetes mellitus due to underlying condition without complication, with long-term current use of insulin (H) (Primary)  - insulin glargine (LANTUS SOLOSTAR) 100 UNIT/ML pen; Inject 30 Units subcutaneously  at bedtime.  Dispense: 30 mL; Refill: 1- Increase to 28 units x 1 week then can increase to 30 units if glucose is still elevated  - insulin lispro (HUMALOG KWIKPEN) 100 UNIT/ML (1 unit dial) KWIKPEN; Inject 30 Units subcutaneously 3 times daily (before meals).  Dispense: 90 mL; Refill: 1  - metFORMIN (GLUCOPHAGE) 500 MG tablet; Take 1 tablet (500 mg) by mouth daily (with breakfast).  Dispense: 90 tablet; Refill: 3  - Hemoglobin A1c; Future  - Hemoglobin A1c    2. 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  Dispense: 30 mL; Refill: 1  - insulin lispro (HUMALOG KWIKPEN) 100 UNIT/ML (1 unit dial) KWIKPEN; Inject 30 Units subcutaneously 3 times daily (before meals).  Dispense: 90 mL; Refill: 1  - metFORMIN (GLUCOPHAGE) 500 MG tablet; Take 1 tablet (500 mg) by mouth daily (with breakfast).  Dispense: 90 tablet; Refill: 3    3. Benign essential hypertension  - losartan-hydrochlorothiazide (HYZAAR) 50-12.5 MG tablet; Take 1 tablet by mouth daily.  Dispense: 90 tablet; Refill: 3    4. Pure hypertriglyceridemia  - fenofibrate (TRICOR) 48 MG tablet; Take 1 tablet (48 mg) by mouth daily.  Dispense: 90 tablet; Refill: 3      Signed Electronically by: Carlyn Tsang CNP

## 2025-01-23 DIAGNOSIS — E13.9 LADA (LATENT AUTOIMMUNE DIABETES IN ADULTS), MANAGED AS TYPE 1 (H): Primary | ICD-10-CM

## 2025-04-06 ENCOUNTER — HEALTH MAINTENANCE LETTER (OUTPATIENT)
Age: 40
End: 2025-04-06

## 2025-06-04 ENCOUNTER — TELEPHONE (OUTPATIENT)
Dept: FAMILY MEDICINE | Facility: CLINIC | Age: 40
End: 2025-06-04
Payer: COMMERCIAL

## 2025-06-04 DIAGNOSIS — E13.9 LADA (LATENT AUTOIMMUNE DIABETES IN ADULTS), MANAGED AS TYPE 1 (H): ICD-10-CM

## 2025-06-04 RX ORDER — ACYCLOVIR 400 MG/1
TABLET ORAL
Qty: 3 EACH | Refills: 5 | Status: SHIPPED | OUTPATIENT
Start: 2025-06-04
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
## 2025-07-10 ENCOUNTER — PATIENT OUTREACH (OUTPATIENT)
Dept: FAMILY MEDICINE | Facility: CLINIC | Age: 40
End: 2025-07-10

## 2025-07-10 NOTE — TELEPHONE ENCOUNTER
Patient Quality Outreach    Patient is due for the following:   Diabetes -  A1C, LDL (Fasting), Eye Exam, Microalbumin, BP Check, and Diabetic Follow-Up Visit    Action(s) Taken:   Schedule a office visit for diabetic exam.     Type of outreach:    Sent [employer] message.    Questions for provider review:    None         Sobeida Ferreira MA  Chart routed to None.

## 2025-07-20 ENCOUNTER — HEALTH MAINTENANCE LETTER (OUTPATIENT)
Age: 40
End: 2025-07-20

## 2025-08-10 ENCOUNTER — HEALTH MAINTENANCE LETTER (OUTPATIENT)
Age: 40
End: 2025-08-10